# Patient Record
Sex: MALE | Race: WHITE | Employment: OTHER | URBAN - METROPOLITAN AREA
[De-identification: names, ages, dates, MRNs, and addresses within clinical notes are randomized per-mention and may not be internally consistent; named-entity substitution may affect disease eponyms.]

---

## 2017-01-01 ENCOUNTER — APPOINTMENT (OUTPATIENT)
Dept: CT IMAGING | Facility: CLINIC | Age: 82
End: 2017-01-01
Attending: EMERGENCY MEDICINE
Payer: MEDICARE

## 2017-01-01 ENCOUNTER — CARE COORDINATION (OUTPATIENT)
Dept: CARE COORDINATION | Facility: CLINIC | Age: 82
End: 2017-01-01

## 2017-01-01 ENCOUNTER — HOSPITAL ENCOUNTER (EMERGENCY)
Facility: CLINIC | Age: 82
Discharge: HOME | End: 2017-03-02
Attending: FAMILY MEDICINE | Admitting: FAMILY MEDICINE
Payer: MEDICARE

## 2017-01-01 ENCOUNTER — TELEPHONE (OUTPATIENT)
Dept: GERIATRICS | Facility: CLINIC | Age: 82
End: 2017-01-01

## 2017-01-01 ENCOUNTER — NURSING HOME VISIT (OUTPATIENT)
Dept: GERIATRICS | Facility: CLINIC | Age: 82
End: 2017-01-01
Payer: COMMERCIAL

## 2017-01-01 ENCOUNTER — APPOINTMENT (OUTPATIENT)
Dept: GENERAL RADIOLOGY | Facility: CLINIC | Age: 82
DRG: 871 | End: 2017-01-01
Attending: NURSE PRACTITIONER
Payer: MEDICARE

## 2017-01-01 ENCOUNTER — TELEPHONE (OUTPATIENT)
Dept: INTERNAL MEDICINE | Facility: CLINIC | Age: 82
End: 2017-01-01

## 2017-01-01 ENCOUNTER — TELEPHONE (OUTPATIENT)
Dept: FAMILY MEDICINE | Facility: CLINIC | Age: 82
End: 2017-01-01

## 2017-01-01 ENCOUNTER — APPOINTMENT (OUTPATIENT)
Dept: CT IMAGING | Facility: CLINIC | Age: 82
End: 2017-01-01
Attending: FAMILY MEDICINE
Payer: MEDICARE

## 2017-01-01 ENCOUNTER — TRANSFERRED RECORDS (OUTPATIENT)
Dept: HEALTH INFORMATION MANAGEMENT | Facility: CLINIC | Age: 82
End: 2017-01-01

## 2017-01-01 ENCOUNTER — HOSPITAL LABORATORY (OUTPATIENT)
Dept: NURSING HOME | Facility: OTHER | Age: 82
End: 2017-01-01

## 2017-01-01 ENCOUNTER — OFFICE VISIT (OUTPATIENT)
Dept: FAMILY MEDICINE | Facility: OTHER | Age: 82
End: 2017-01-01
Payer: COMMERCIAL

## 2017-01-01 ENCOUNTER — TELEPHONE (OUTPATIENT)
Dept: NURSING | Facility: CLINIC | Age: 82
End: 2017-01-01

## 2017-01-01 ENCOUNTER — APPOINTMENT (OUTPATIENT)
Dept: GENERAL RADIOLOGY | Facility: CLINIC | Age: 82
End: 2017-01-01
Attending: EMERGENCY MEDICINE
Payer: MEDICARE

## 2017-01-01 ENCOUNTER — HOSPITAL ENCOUNTER (OUTPATIENT)
Dept: NUCLEAR MEDICINE | Facility: CLINIC | Age: 82
Setting detail: NUCLEAR MEDICINE
Discharge: HOME | End: 2017-01-03
Attending: INTERNAL MEDICINE | Admitting: INTERNAL MEDICINE
Payer: MEDICARE

## 2017-01-01 ENCOUNTER — OFFICE VISIT (OUTPATIENT)
Dept: CARDIOLOGY | Facility: CLINIC | Age: 82
End: 2017-01-01
Payer: COMMERCIAL

## 2017-01-01 ENCOUNTER — APPOINTMENT (OUTPATIENT)
Dept: SPEECH THERAPY | Facility: CLINIC | Age: 82
DRG: 871 | End: 2017-01-01
Attending: FAMILY MEDICINE
Payer: MEDICARE

## 2017-01-01 ENCOUNTER — HOSPITAL ENCOUNTER (INPATIENT)
Facility: CLINIC | Age: 82
LOS: 4 days | DRG: 871 | End: 2017-04-21
Attending: FAMILY MEDICINE | Admitting: FAMILY MEDICINE
Payer: MEDICARE

## 2017-01-01 ENCOUNTER — TELEPHONE (OUTPATIENT)
Dept: FAMILY MEDICINE | Facility: OTHER | Age: 82
End: 2017-01-01

## 2017-01-01 ENCOUNTER — TELEPHONE (OUTPATIENT)
Dept: CARDIOLOGY | Facility: CLINIC | Age: 82
End: 2017-01-01

## 2017-01-01 ENCOUNTER — APPOINTMENT (OUTPATIENT)
Dept: GENERAL RADIOLOGY | Facility: CLINIC | Age: 82
End: 2017-01-01
Attending: FAMILY MEDICINE
Payer: MEDICARE

## 2017-01-01 ENCOUNTER — HOSPITAL ENCOUNTER (INPATIENT)
Facility: CLINIC | Age: 82
LOS: 3 days | Discharge: NURSING, REHAB OR LONG-TERM CARE | DRG: 871 | End: 2017-03-20
Attending: FAMILY MEDICINE | Admitting: FAMILY MEDICINE
Payer: MEDICARE

## 2017-01-01 ENCOUNTER — HOSPITAL ENCOUNTER (OUTPATIENT)
Dept: CARDIOLOGY | Facility: CLINIC | Age: 82
Discharge: HOME | End: 2017-01-16
Attending: INTERNAL MEDICINE | Admitting: INTERNAL MEDICINE
Payer: MEDICARE

## 2017-01-01 ENCOUNTER — APPOINTMENT (OUTPATIENT)
Dept: GENERAL RADIOLOGY | Facility: CLINIC | Age: 82
DRG: 871 | End: 2017-01-01
Attending: FAMILY MEDICINE
Payer: MEDICARE

## 2017-01-01 ENCOUNTER — OFFICE VISIT (OUTPATIENT)
Dept: CARDIOLOGY | Facility: CLINIC | Age: 82
End: 2017-01-01
Attending: INTERNAL MEDICINE
Payer: COMMERCIAL

## 2017-01-01 ENCOUNTER — ALLIED HEALTH/NURSE VISIT (OUTPATIENT)
Dept: FAMILY MEDICINE | Facility: OTHER | Age: 82
End: 2017-01-01
Payer: COMMERCIAL

## 2017-01-01 ENCOUNTER — HOSPITAL ENCOUNTER (OUTPATIENT)
Facility: CLINIC | Age: 82
Setting detail: OBSERVATION
Discharge: HOME | End: 2017-02-11
Attending: EMERGENCY MEDICINE | Admitting: INTERNAL MEDICINE
Payer: MEDICARE

## 2017-01-01 ENCOUNTER — APPOINTMENT (OUTPATIENT)
Dept: MRI IMAGING | Facility: CLINIC | Age: 82
End: 2017-01-01
Attending: INTERNAL MEDICINE
Payer: MEDICARE

## 2017-01-01 VITALS
HEART RATE: 69 BPM | WEIGHT: 149.91 LBS | OXYGEN SATURATION: 90 % | RESPIRATION RATE: 20 BRPM | TEMPERATURE: 97 F | BODY MASS INDEX: 25.59 KG/M2 | DIASTOLIC BLOOD PRESSURE: 74 MMHG | HEIGHT: 64 IN | SYSTOLIC BLOOD PRESSURE: 130 MMHG

## 2017-01-01 VITALS
BODY MASS INDEX: 25.54 KG/M2 | OXYGEN SATURATION: 97 % | HEIGHT: 64 IN | DIASTOLIC BLOOD PRESSURE: 52 MMHG | HEART RATE: 66 BPM | SYSTOLIC BLOOD PRESSURE: 90 MMHG | WEIGHT: 149.6 LBS

## 2017-01-01 VITALS
DIASTOLIC BLOOD PRESSURE: 41 MMHG | TEMPERATURE: 96.6 F | SYSTOLIC BLOOD PRESSURE: 75 MMHG | HEART RATE: 52 BPM | OXYGEN SATURATION: 70 %

## 2017-01-01 VITALS
RESPIRATION RATE: 24 BRPM | BODY MASS INDEX: 21.27 KG/M2 | WEIGHT: 124.56 LBS | HEART RATE: 96 BPM | DIASTOLIC BLOOD PRESSURE: 84 MMHG | HEIGHT: 64 IN | SYSTOLIC BLOOD PRESSURE: 191 MMHG | TEMPERATURE: 98.6 F | OXYGEN SATURATION: 81 %

## 2017-01-01 VITALS
DIASTOLIC BLOOD PRESSURE: 64 MMHG | BODY MASS INDEX: 24.94 KG/M2 | SYSTOLIC BLOOD PRESSURE: 168 MMHG | HEIGHT: 64 IN | OXYGEN SATURATION: 98 % | WEIGHT: 146.1 LBS | HEART RATE: 66 BPM | TEMPERATURE: 97 F

## 2017-01-01 VITALS
SYSTOLIC BLOOD PRESSURE: 114 MMHG | RESPIRATION RATE: 18 BRPM | WEIGHT: 140.8 LBS | DIASTOLIC BLOOD PRESSURE: 71 MMHG | TEMPERATURE: 96.6 F | OXYGEN SATURATION: 90 % | HEART RATE: 68 BPM | BODY MASS INDEX: 24.17 KG/M2

## 2017-01-01 VITALS
DIASTOLIC BLOOD PRESSURE: 55 MMHG | WEIGHT: 148.9 LBS | HEART RATE: 64 BPM | TEMPERATURE: 97.6 F | SYSTOLIC BLOOD PRESSURE: 87 MMHG | RESPIRATION RATE: 16 BRPM | OXYGEN SATURATION: 99 % | BODY MASS INDEX: 25.56 KG/M2

## 2017-01-01 VITALS
HEIGHT: 65 IN | DIASTOLIC BLOOD PRESSURE: 50 MMHG | BODY MASS INDEX: 24.97 KG/M2 | WEIGHT: 149.9 LBS | RESPIRATION RATE: 13 BRPM | SYSTOLIC BLOOD PRESSURE: 118 MMHG | HEART RATE: 66 BPM | OXYGEN SATURATION: 97 %

## 2017-01-01 VITALS
HEIGHT: 64 IN | HEART RATE: 61 BPM | BODY MASS INDEX: 23.9 KG/M2 | RESPIRATION RATE: 18 BRPM | SYSTOLIC BLOOD PRESSURE: 149 MMHG | OXYGEN SATURATION: 96 % | DIASTOLIC BLOOD PRESSURE: 76 MMHG | TEMPERATURE: 97.1 F | WEIGHT: 139.99 LBS

## 2017-01-01 VITALS
HEART RATE: 100 BPM | DIASTOLIC BLOOD PRESSURE: 73 MMHG | OXYGEN SATURATION: 90 % | RESPIRATION RATE: 24 BRPM | TEMPERATURE: 97.7 F | SYSTOLIC BLOOD PRESSURE: 155 MMHG

## 2017-01-01 VITALS
OXYGEN SATURATION: 96 % | TEMPERATURE: 97 F | BODY MASS INDEX: 24.75 KG/M2 | HEIGHT: 64 IN | WEIGHT: 145 LBS | SYSTOLIC BLOOD PRESSURE: 153 MMHG | RESPIRATION RATE: 16 BRPM | DIASTOLIC BLOOD PRESSURE: 81 MMHG

## 2017-01-01 VITALS
TEMPERATURE: 97.7 F | WEIGHT: 156.8 LBS | DIASTOLIC BLOOD PRESSURE: 42 MMHG | OXYGEN SATURATION: 94 % | HEART RATE: 75 BPM | BODY MASS INDEX: 26.77 KG/M2 | SYSTOLIC BLOOD PRESSURE: 102 MMHG | HEIGHT: 64 IN

## 2017-01-01 VITALS
OXYGEN SATURATION: 99 % | DIASTOLIC BLOOD PRESSURE: 62 MMHG | HEART RATE: 86 BPM | WEIGHT: 148 LBS | RESPIRATION RATE: 12 BRPM | SYSTOLIC BLOOD PRESSURE: 124 MMHG | BODY MASS INDEX: 25.4 KG/M2 | TEMPERATURE: 97 F

## 2017-01-01 VITALS
SYSTOLIC BLOOD PRESSURE: 146 MMHG | TEMPERATURE: 97.6 F | BODY MASS INDEX: 21.75 KG/M2 | DIASTOLIC BLOOD PRESSURE: 87 MMHG | WEIGHT: 126.7 LBS | RESPIRATION RATE: 12 BRPM | OXYGEN SATURATION: 94 % | HEART RATE: 68 BPM

## 2017-01-01 DIAGNOSIS — N40.1 BENIGN NON-NODULAR PROSTATIC HYPERPLASIA WITH LOWER URINARY TRACT SYMPTOMS: ICD-10-CM

## 2017-01-01 DIAGNOSIS — J18.9 PNEUMONIA OF RIGHT LOWER LOBE DUE TO INFECTIOUS ORGANISM: ICD-10-CM

## 2017-01-01 DIAGNOSIS — R60.0 LOCALIZED EDEMA: Primary | ICD-10-CM

## 2017-01-01 DIAGNOSIS — F03.91 DEMENTIA WITH BEHAVIORAL DISTURBANCE, UNSPECIFIED DEMENTIA TYPE: ICD-10-CM

## 2017-01-01 DIAGNOSIS — M62.81 GENERALIZED MUSCLE WEAKNESS: ICD-10-CM

## 2017-01-01 DIAGNOSIS — R13.10 DYSPHAGIA, UNSPECIFIED TYPE: ICD-10-CM

## 2017-01-01 DIAGNOSIS — Z01.30 BP CHECK: Primary | ICD-10-CM

## 2017-01-01 DIAGNOSIS — N39.41 URGENCY INCONTINENCE: ICD-10-CM

## 2017-01-01 DIAGNOSIS — I67.2 ATHEROSCLEROTIC CEREBROVASCULAR DISEASE: ICD-10-CM

## 2017-01-01 DIAGNOSIS — A41.9 BACTERIAL SEPSIS (H): ICD-10-CM

## 2017-01-01 DIAGNOSIS — E86.0 DEHYDRATION: ICD-10-CM

## 2017-01-01 DIAGNOSIS — I25.110 CORONARY ARTERY DISEASE INVOLVING NATIVE CORONARY ARTERY OF NATIVE HEART WITH UNSTABLE ANGINA PECTORIS (H): Primary | ICD-10-CM

## 2017-01-01 DIAGNOSIS — B37.0 THRUSH: ICD-10-CM

## 2017-01-01 DIAGNOSIS — F03.91 DEMENTIA WITH BEHAVIORAL DISTURBANCE, UNSPECIFIED DEMENTIA TYPE: Primary | ICD-10-CM

## 2017-01-01 DIAGNOSIS — R60.0 LOCALIZED EDEMA: ICD-10-CM

## 2017-01-01 DIAGNOSIS — R94.39 ABNORMAL CARDIOVASCULAR STRESS TEST: ICD-10-CM

## 2017-01-01 DIAGNOSIS — I10 BENIGN ESSENTIAL HYPERTENSION: Primary | ICD-10-CM

## 2017-01-01 DIAGNOSIS — I67.2 ATHEROSCLEROTIC CEREBROVASCULAR DISEASE: Primary | ICD-10-CM

## 2017-01-01 DIAGNOSIS — R00.1 BRADYCARDIA: ICD-10-CM

## 2017-01-01 DIAGNOSIS — I21.4 MYOCARDIAL INFARCTION, NONTRANSMURAL (H): ICD-10-CM

## 2017-01-01 DIAGNOSIS — R79.89 ELEVATED TROPONIN: ICD-10-CM

## 2017-01-01 DIAGNOSIS — I49.5 SINOATRIAL NODE DYSFUNCTION (H): ICD-10-CM

## 2017-01-01 DIAGNOSIS — R82.81 PYURIA: ICD-10-CM

## 2017-01-01 DIAGNOSIS — M25.562 LEFT KNEE PAIN: Primary | ICD-10-CM

## 2017-01-01 DIAGNOSIS — J69.0 ASPIRATION PNEUMONIA OF BOTH LUNGS, UNSPECIFIED ASPIRATION PNEUMONIA TYPE, UNSPECIFIED PART OF LUNG (H): ICD-10-CM

## 2017-01-01 DIAGNOSIS — Z71.89 ADVANCED DIRECTIVES, COUNSELING/DISCUSSION: ICD-10-CM

## 2017-01-01 DIAGNOSIS — J69.0 ASPIRATION PNEUMONIA OF BOTH LUNGS, UNSPECIFIED ASPIRATION PNEUMONIA TYPE, UNSPECIFIED PART OF LUNG (H): Primary | ICD-10-CM

## 2017-01-01 DIAGNOSIS — A41.9 SEPSIS, DUE TO UNSPECIFIED ORGANISM: ICD-10-CM

## 2017-01-01 DIAGNOSIS — I95.9 HYPOTENSION, UNSPECIFIED HYPOTENSION TYPE: Primary | ICD-10-CM

## 2017-01-01 DIAGNOSIS — I67.2 ARTERIOSCLEROTIC CEREBROVASCULAR DISEASE: Primary | ICD-10-CM

## 2017-01-01 DIAGNOSIS — I25.10 CORONARY ARTERY DISEASE INVOLVING NATIVE HEART, ANGINA PRESENCE UNSPECIFIED, UNSPECIFIED VESSEL OR LESION TYPE: Primary | ICD-10-CM

## 2017-01-01 DIAGNOSIS — Z51.5 END OF LIFE CARE: Primary | ICD-10-CM

## 2017-01-01 DIAGNOSIS — R40.4 TRANSIENT ALTERATION OF AWARENESS: ICD-10-CM

## 2017-01-01 DIAGNOSIS — R63.4 LOSS OF WEIGHT: ICD-10-CM

## 2017-01-01 DIAGNOSIS — J18.9 HEALTHCARE-ASSOCIATED PNEUMONIA: ICD-10-CM

## 2017-01-01 DIAGNOSIS — I25.10 CORONARY ARTERY DISEASE INVOLVING NATIVE HEART, ANGINA PRESENCE UNSPECIFIED, UNSPECIFIED VESSEL OR LESION TYPE: ICD-10-CM

## 2017-01-01 DIAGNOSIS — H57.9 EYE DISEASE: ICD-10-CM
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
## 2017-01-01 DIAGNOSIS — J96.21 ACUTE ON CHRONIC RESPIRATORY FAILURE WITH HYPOXIA (H): ICD-10-CM

## 2017-01-01 DIAGNOSIS — I49.5 SINOATRIAL NODE DYSFUNCTION (H): Primary | ICD-10-CM

## 2017-01-01 DIAGNOSIS — R94.39 ABNORMAL STRESS TEST: Primary | ICD-10-CM

## 2017-01-01 LAB
ALBUMIN SERPL-MCNC: 2.7 G/DL (ref 3.4–5)
ALBUMIN SERPL-MCNC: 2.9 G/DL (ref 3.4–5)
ALBUMIN SERPL-MCNC: 3.3 G/DL (ref 3.4–5)
ALBUMIN UR-MCNC: 100 MG/DL
ALBUMIN UR-MCNC: ABNORMAL MG/DL
ALBUMIN UR-MCNC: NEGATIVE MG/DL
ALP SERPL-CCNC: 66 U/L (ref 40–150)
ALP SERPL-CCNC: 70 U/L (ref 40–150)
ALP SERPL-CCNC: 79 U/L (ref 40–150)
ALT SERPL W P-5'-P-CCNC: 23 U/L (ref 0–70)
ALT SERPL W P-5'-P-CCNC: 31 U/L (ref 0–70)
ALT SERPL W P-5'-P-CCNC: 32 U/L (ref 0–70)
ANION GAP SERPL CALCULATED.3IONS-SCNC: 11 MMOL/L (ref 3–14)
ANION GAP SERPL CALCULATED.3IONS-SCNC: 4 MMOL/L (ref 3–14)
ANION GAP SERPL CALCULATED.3IONS-SCNC: 6 MMOL/L (ref 3–14)
ANION GAP SERPL CALCULATED.3IONS-SCNC: 7 MMOL/L (ref 3–14)
ANION GAP SERPL CALCULATED.3IONS-SCNC: 8 MMOL/L (ref 3–14)
ANION GAP SERPL CALCULATED.3IONS-SCNC: 9 MMOL/L (ref 3–14)
APPEARANCE UR: ABNORMAL
APPEARANCE UR: CLEAR
APPEARANCE UR: CLEAR
AST SERPL W P-5'-P-CCNC: 19 U/L (ref 0–45)
AST SERPL W P-5'-P-CCNC: 23 U/L (ref 0–45)
AST SERPL W P-5'-P-CCNC: 25 U/L (ref 0–45)
BACTERIA #/AREA URNS HPF: ABNORMAL /HPF
BACTERIA #/AREA URNS HPF: ABNORMAL /HPF
BACTERIA SPEC CULT: NORMAL
BASE EXCESS BLDV CALC-SCNC: 4.6 MMOL/L
BASOPHILS # BLD AUTO: 0 10E9/L (ref 0–0.2)
BASOPHILS NFR BLD AUTO: 0.2 %
BASOPHILS NFR BLD AUTO: 0.3 %
BASOPHILS NFR BLD AUTO: 0.3 %
BASOPHILS NFR BLD AUTO: 0.4 %
BASOPHILS NFR BLD AUTO: 0.4 %
BILIRUB SERPL-MCNC: 0.4 MG/DL (ref 0.2–1.3)
BILIRUB SERPL-MCNC: 0.5 MG/DL (ref 0.2–1.3)
BILIRUB SERPL-MCNC: 1.1 MG/DL (ref 0.2–1.3)
BILIRUB UR QL STRIP: NEGATIVE
BUN SERPL-MCNC: 18 MG/DL (ref 7–30)
BUN SERPL-MCNC: 20 MG/DL (ref 7–30)
BUN SERPL-MCNC: 24 MG/DL (ref 7–30)
BUN SERPL-MCNC: 24 MG/DL (ref 7–30)
BUN SERPL-MCNC: 32 MG/DL (ref 7–30)
BUN SERPL-MCNC: 33 MG/DL (ref 7–30)
BUN SERPL-MCNC: 40 MG/DL (ref 7–30)
BUN SERPL-MCNC: 50 MG/DL (ref 7–30)
BUN SERPL-MCNC: 56 MG/DL (ref 7–30)
BUN SERPL-MCNC: 61 MG/DL (ref 7–30)
BUN SERPL-MCNC: 75 MG/DL (ref 7–30)
CALCIUM SERPL-MCNC: 10.4 MG/DL (ref 8.5–10.1)
CALCIUM SERPL-MCNC: 8.5 MG/DL (ref 8.5–10.1)
CALCIUM SERPL-MCNC: 8.5 MG/DL (ref 8.5–10.1)
CALCIUM SERPL-MCNC: 8.6 MG/DL (ref 8.5–10.1)
CALCIUM SERPL-MCNC: 8.9 MG/DL (ref 8.5–10.1)
CALCIUM SERPL-MCNC: 9 MG/DL (ref 8.5–10.1)
CALCIUM SERPL-MCNC: 9 MG/DL (ref 8.5–10.1)
CALCIUM SERPL-MCNC: 9.6 MG/DL (ref 8.5–10.1)
CALCIUM SERPL-MCNC: 9.6 MG/DL (ref 8.5–10.1)
CHLORIDE SERPL-SCNC: 105 MMOL/L (ref 94–109)
CHLORIDE SERPL-SCNC: 106 MMOL/L (ref 94–109)
CHLORIDE SERPL-SCNC: 107 MMOL/L (ref 94–109)
CHLORIDE SERPL-SCNC: 109 MMOL/L (ref 94–109)
CHLORIDE SERPL-SCNC: 109 MMOL/L (ref 94–109)
CHLORIDE SERPL-SCNC: 113 MMOL/L (ref 94–109)
CHLORIDE SERPL-SCNC: 116 MMOL/L (ref 94–109)
CHLORIDE SERPL-SCNC: 116 MMOL/L (ref 94–109)
CHLORIDE SERPL-SCNC: 118 MMOL/L (ref 94–109)
CHLORIDE SERPL-SCNC: 122 MMOL/L (ref 94–109)
CHLORIDE SERPL-SCNC: 126 MMOL/L (ref 94–109)
CO2 SERPL-SCNC: 27 MMOL/L (ref 20–32)
CO2 SERPL-SCNC: 28 MMOL/L (ref 20–32)
CO2 SERPL-SCNC: 30 MMOL/L (ref 20–32)
CO2 SERPL-SCNC: 31 MMOL/L (ref 20–32)
CO2 SERPL-SCNC: 34 MMOL/L (ref 20–32)
COLOR UR AUTO: ABNORMAL
COLOR UR AUTO: YELLOW
COLOR UR AUTO: YELLOW
CREAT SERPL-MCNC: 0.85 MG/DL (ref 0.66–1.25)
CREAT SERPL-MCNC: 0.87 MG/DL (ref 0.66–1.25)
CREAT SERPL-MCNC: 0.92 MG/DL (ref 0.66–1.25)
CREAT SERPL-MCNC: 1.03 MG/DL (ref 0.66–1.25)
CREAT SERPL-MCNC: 1.05 MG/DL (ref 0.66–1.25)
CREAT SERPL-MCNC: 1.24 MG/DL (ref 0.66–1.25)
CREAT SERPL-MCNC: 1.34 MG/DL (ref 0.66–1.25)
CREAT SERPL-MCNC: 1.67 MG/DL (ref 0.66–1.25)
CREAT SERPL-MCNC: 1.79 MG/DL (ref 0.66–1.25)
CREAT SERPL-MCNC: 1.95 MG/DL (ref 0.66–1.25)
CREAT SERPL-MCNC: 2.44 MG/DL (ref 0.66–1.25)
CRP SERPL-MCNC: 207 MG/L (ref 0–8)
DIFFERENTIAL METHOD BLD: ABNORMAL
EOSINOPHIL # BLD AUTO: 0 10E9/L (ref 0–0.7)
EOSINOPHIL # BLD AUTO: 0 10E9/L (ref 0–0.7)
EOSINOPHIL # BLD AUTO: 0.1 10E9/L (ref 0–0.7)
EOSINOPHIL # BLD AUTO: 0.1 10E9/L (ref 0–0.7)
EOSINOPHIL # BLD AUTO: 0.3 10E9/L (ref 0–0.7)
EOSINOPHIL NFR BLD AUTO: 0.4 %
EOSINOPHIL NFR BLD AUTO: 0.5 %
EOSINOPHIL NFR BLD AUTO: 1 %
EOSINOPHIL NFR BLD AUTO: 1.8 %
EOSINOPHIL NFR BLD AUTO: 3.6 %
ERYTHROCYTE [DISTWIDTH] IN BLOOD BY AUTOMATED COUNT: 13.1 % (ref 10–15)
ERYTHROCYTE [DISTWIDTH] IN BLOOD BY AUTOMATED COUNT: 13.3 % (ref 10–15)
ERYTHROCYTE [DISTWIDTH] IN BLOOD BY AUTOMATED COUNT: 14.1 % (ref 10–15)
ERYTHROCYTE [DISTWIDTH] IN BLOOD BY AUTOMATED COUNT: 14.4 % (ref 10–15)
ERYTHROCYTE [DISTWIDTH] IN BLOOD BY AUTOMATED COUNT: 14.4 % (ref 10–15)
ERYTHROCYTE [DISTWIDTH] IN BLOOD BY AUTOMATED COUNT: 15.1 % (ref 10–15)
ERYTHROCYTE [DISTWIDTH] IN BLOOD BY AUTOMATED COUNT: 15.2 % (ref 10–15)
FLUAV+FLUBV AG SPEC QL: ABNORMAL
FLUAV+FLUBV AG SPEC QL: NEGATIVE
FLUAV+FLUBV AG SPEC QL: NORMAL
FLUAV+FLUBV AG SPEC QL: POSITIVE
GFR SERPL CREATININE-BSD FRML MDRD: 25 ML/MIN/1.7M2
GFR SERPL CREATININE-BSD FRML MDRD: 33 ML/MIN/1.7M2
GFR SERPL CREATININE-BSD FRML MDRD: 36 ML/MIN/1.7M2
GFR SERPL CREATININE-BSD FRML MDRD: 39 ML/MIN/1.7M2
GFR SERPL CREATININE-BSD FRML MDRD: 51 ML/MIN/1.7M2
GFR SERPL CREATININE-BSD FRML MDRD: 55 ML/MIN/1.7M2
GFR SERPL CREATININE-BSD FRML MDRD: 67 ML/MIN/1.7M2
GFR SERPL CREATININE-BSD FRML MDRD: 68 ML/MIN/1.7M2
GFR SERPL CREATININE-BSD FRML MDRD: 78 ML/MIN/1.7M2
GFR SERPL CREATININE-BSD FRML MDRD: 83 ML/MIN/1.7M2
GFR SERPL CREATININE-BSD FRML MDRD: 85 ML/MIN/1.7M2
GLUCOSE BLDC GLUCOMTR-MCNC: 167 MG/DL (ref 70–99)
GLUCOSE SERPL-MCNC: 103 MG/DL (ref 70–99)
GLUCOSE SERPL-MCNC: 111 MG/DL (ref 70–99)
GLUCOSE SERPL-MCNC: 123 MG/DL (ref 70–99)
GLUCOSE SERPL-MCNC: 127 MG/DL (ref 70–99)
GLUCOSE SERPL-MCNC: 128 MG/DL (ref 70–99)
GLUCOSE SERPL-MCNC: 130 MG/DL (ref 70–99)
GLUCOSE SERPL-MCNC: 130 MG/DL (ref 70–99)
GLUCOSE SERPL-MCNC: 141 MG/DL (ref 70–99)
GLUCOSE SERPL-MCNC: 151 MG/DL (ref 70–99)
GLUCOSE SERPL-MCNC: 159 MG/DL (ref 70–99)
GLUCOSE SERPL-MCNC: 94 MG/DL (ref 70–99)
GLUCOSE UR STRIP-MCNC: 50 MG/DL
GLUCOSE UR STRIP-MCNC: NEGATIVE MG/DL
GLUCOSE UR STRIP-MCNC: NEGATIVE MG/DL
HCO3 BLDV-SCNC: 30 MMOL/L (ref 21–28)
HCT VFR BLD AUTO: 35.9 % (ref 40–53)
HCT VFR BLD AUTO: 36 % (ref 40–53)
HCT VFR BLD AUTO: 36.4 % (ref 40–53)
HCT VFR BLD AUTO: 36.5 % (ref 40–53)
HCT VFR BLD AUTO: 37.8 % (ref 40–53)
HCT VFR BLD AUTO: 43 % (ref 40–53)
HCT VFR BLD AUTO: 45.4 % (ref 40–53)
HEMOCCULT STL QL: NEGATIVE
HGB BLD-MCNC: 11.5 G/DL (ref 13.3–17.7)
HGB BLD-MCNC: 11.5 G/DL (ref 13.3–17.7)
HGB BLD-MCNC: 12.1 G/DL (ref 13.3–17.7)
HGB BLD-MCNC: 12.4 G/DL (ref 13.3–17.7)
HGB BLD-MCNC: 12.6 G/DL (ref 13.3–17.7)
HGB BLD-MCNC: 14.1 G/DL (ref 13.3–17.7)
HGB BLD-MCNC: 14.2 G/DL (ref 13.3–17.7)
HGB BLD-MCNC: 14.8 G/DL (ref 13.3–17.7)
HGB UR QL STRIP: ABNORMAL
HYALINE CASTS #/AREA URNS LPF: 14 /LPF (ref 0–2)
HYALINE CASTS #/AREA URNS LPF: ABNORMAL /LPF (ref 0–2)
HYALINE CASTS #/AREA URNS LPF: ABNORMAL /LPF (ref 0–2)
IMM GRANULOCYTES # BLD: 0 10E9/L (ref 0–0.4)
IMM GRANULOCYTES NFR BLD: 0.1 %
IMM GRANULOCYTES NFR BLD: 0.1 %
IMM GRANULOCYTES NFR BLD: 0.3 %
IMM GRANULOCYTES NFR BLD: 0.3 %
IMM GRANULOCYTES NFR BLD: 0.4 %
KETONES UR STRIP-MCNC: NEGATIVE MG/DL
LACTATE BLD-SCNC: 1.5 MMOL/L (ref 0.7–2.1)
LACTATE BLD-SCNC: 1.9 MMOL/L (ref 0.7–2.1)
LACTATE BLD-SCNC: 2.3 MMOL/L (ref 0.7–2.1)
LEUKOCYTE ESTERASE UR QL STRIP: ABNORMAL
LEUKOCYTE ESTERASE UR QL STRIP: ABNORMAL
LEUKOCYTE ESTERASE UR QL STRIP: NEGATIVE
LYMPHOCYTES # BLD AUTO: 0.5 10E9/L (ref 0.8–5.3)
LYMPHOCYTES # BLD AUTO: 0.6 10E9/L (ref 0.8–5.3)
LYMPHOCYTES # BLD AUTO: 0.7 10E9/L (ref 0.8–5.3)
LYMPHOCYTES # BLD AUTO: 0.7 10E9/L (ref 0.8–5.3)
LYMPHOCYTES # BLD AUTO: 1 10E9/L (ref 0.8–5.3)
LYMPHOCYTES NFR BLD AUTO: 11.2 %
LYMPHOCYTES NFR BLD AUTO: 13.6 %
LYMPHOCYTES NFR BLD AUTO: 6.2 %
LYMPHOCYTES NFR BLD AUTO: 8.2 %
LYMPHOCYTES NFR BLD AUTO: 9.3 %
Lab: 30
Lab: 30
MCH RBC QN AUTO: 31.4 PG (ref 26.5–33)
MCH RBC QN AUTO: 31.4 PG (ref 26.5–33)
MCH RBC QN AUTO: 31.6 PG (ref 26.5–33)
MCH RBC QN AUTO: 31.8 PG (ref 26.5–33)
MCH RBC QN AUTO: 31.8 PG (ref 26.5–33)
MCHC RBC AUTO-ENTMCNC: 31.9 G/DL (ref 31.5–36.5)
MCHC RBC AUTO-ENTMCNC: 32 G/DL (ref 31.5–36.5)
MCHC RBC AUTO-ENTMCNC: 32.6 G/DL (ref 31.5–36.5)
MCHC RBC AUTO-ENTMCNC: 32.8 G/DL (ref 31.5–36.5)
MCHC RBC AUTO-ENTMCNC: 33.2 G/DL (ref 31.5–36.5)
MCHC RBC AUTO-ENTMCNC: 33.3 G/DL (ref 31.5–36.5)
MCHC RBC AUTO-ENTMCNC: 34 G/DL (ref 31.5–36.5)
MCV RBC AUTO: 94 FL (ref 78–100)
MCV RBC AUTO: 95 FL (ref 78–100)
MCV RBC AUTO: 96 FL (ref 78–100)
MCV RBC AUTO: 99 FL (ref 78–100)
MCV RBC AUTO: 99 FL (ref 78–100)
MICRO REPORT STATUS: NORMAL
MONOCYTES # BLD AUTO: 0.4 10E9/L (ref 0–1.3)
MONOCYTES # BLD AUTO: 0.6 10E9/L (ref 0–1.3)
MONOCYTES # BLD AUTO: 0.7 10E9/L (ref 0–1.3)
MONOCYTES # BLD AUTO: 0.8 10E9/L (ref 0–1.3)
MONOCYTES # BLD AUTO: 1.2 10E9/L (ref 0–1.3)
MONOCYTES NFR BLD AUTO: 16.1 %
MONOCYTES NFR BLD AUTO: 8.1 %
MONOCYTES NFR BLD AUTO: 8.1 %
MONOCYTES NFR BLD AUTO: 8.9 %
MONOCYTES NFR BLD AUTO: 9.4 %
MUCOUS THREADS #/AREA URNS LPF: PRESENT /LPF
MUCOUS THREADS #/AREA URNS LPF: PRESENT /LPF
NEUTROPHILS # BLD AUTO: 4 10E9/L (ref 1.6–8.3)
NEUTROPHILS # BLD AUTO: 5.5 10E9/L (ref 1.6–8.3)
NEUTROPHILS # BLD AUTO: 5.5 10E9/L (ref 1.6–8.3)
NEUTROPHILS # BLD AUTO: 5.7 10E9/L (ref 1.6–8.3)
NEUTROPHILS # BLD AUTO: 7 10E9/L (ref 1.6–8.3)
NEUTROPHILS NFR BLD AUTO: 76.2 %
NEUTROPHILS NFR BLD AUTO: 77.3 %
NEUTROPHILS NFR BLD AUTO: 77.6 %
NEUTROPHILS NFR BLD AUTO: 78.1 %
NEUTROPHILS NFR BLD AUTO: 81.6 %
NITRATE UR QL: NEGATIVE
NON-SQ EPI CELLS #/AREA URNS LPF: ABNORMAL /LPF
NT-PROBNP SERPL-MCNC: 502 PG/ML (ref 0–1800)
NT-PROBNP SERPL-MCNC: 889 PG/ML (ref 0–1800)
O2/TOTAL GAS SETTING VFR VENT: 28 %
PCO2 BLDV: 51 MM HG (ref 40–50)
PH BLDV: 7.38 PH (ref 7.32–7.43)
PH UR STRIP: 5 PH (ref 5–7)
PH UR STRIP: 6 PH (ref 5–7)
PH UR STRIP: 6.5 PH (ref 5–7)
PLATELET # BLD AUTO: 136 10E9/L (ref 150–450)
PLATELET # BLD AUTO: 155 10E9/L (ref 150–450)
PLATELET # BLD AUTO: 163 10E9/L (ref 150–450)
PLATELET # BLD AUTO: 170 10E9/L (ref 150–450)
PLATELET # BLD AUTO: 207 10E9/L (ref 150–450)
PLATELET # BLD AUTO: 208 10E9/L (ref 150–450)
PLATELET # BLD AUTO: 211 10E9/L (ref 150–450)
PO2 BLDV: 54 MM HG (ref 25–47)
POTASSIUM SERPL-SCNC: 2.6 MMOL/L (ref 3.4–5.3)
POTASSIUM SERPL-SCNC: 3.3 MMOL/L (ref 3.4–5.3)
POTASSIUM SERPL-SCNC: 3.5 MMOL/L (ref 3.4–5.3)
POTASSIUM SERPL-SCNC: 3.7 MMOL/L (ref 3.4–5.3)
POTASSIUM SERPL-SCNC: 3.8 MMOL/L (ref 3.4–5.3)
POTASSIUM SERPL-SCNC: 3.9 MMOL/L (ref 3.4–5.3)
POTASSIUM SERPL-SCNC: 3.9 MMOL/L (ref 3.4–5.3)
POTASSIUM SERPL-SCNC: 4 MMOL/L (ref 3.4–5.3)
POTASSIUM SERPL-SCNC: 4 MMOL/L (ref 3.4–5.3)
PROT SERPL-MCNC: 6 G/DL (ref 6.8–8.8)
PROT SERPL-MCNC: 6.1 G/DL (ref 6.8–8.8)
PROT SERPL-MCNC: 6.7 G/DL (ref 6.8–8.8)
RBC # BLD AUTO: 3.64 10E12/L (ref 4.4–5.9)
RBC # BLD AUTO: 3.64 10E12/L (ref 4.4–5.9)
RBC # BLD AUTO: 3.81 10E12/L (ref 4.4–5.9)
RBC # BLD AUTO: 3.9 10E12/L (ref 4.4–5.9)
RBC # BLD AUTO: 3.99 10E12/L (ref 4.4–5.9)
RBC # BLD AUTO: 4.49 10E12/L (ref 4.4–5.9)
RBC # BLD AUTO: 4.72 10E12/L (ref 4.4–5.9)
RBC #/AREA URNS AUTO: >182 /HPF (ref 0–2)
RBC #/AREA URNS AUTO: ABNORMAL /HPF (ref 0–2)
RBC #/AREA URNS AUTO: ABNORMAL /HPF (ref 0–2)
SODIUM SERPL-SCNC: 144 MMOL/L (ref 133–144)
SODIUM SERPL-SCNC: 147 MMOL/L (ref 133–144)
SODIUM SERPL-SCNC: 150 MMOL/L (ref 133–144)
SODIUM SERPL-SCNC: 155 MMOL/L (ref 133–144)
SODIUM SERPL-SCNC: 156 MMOL/L (ref 133–144)
SODIUM SERPL-SCNC: 160 MMOL/L (ref 133–144)
SODIUM SERPL-SCNC: 161 MMOL/L (ref 133–144)
SODIUM SERPL-SCNC: 162 MMOL/L (ref 133–144)
SP GR UR STRIP: 1.01 (ref 1–1.03)
SP GR UR STRIP: 1.02 (ref 1–1.03)
SP GR UR STRIP: 1.02 (ref 1–1.03)
SPECIMEN SOURCE: ABNORMAL
SPECIMEN SOURCE: NORMAL
SQUAMOUS #/AREA URNS AUTO: 1 /HPF (ref 0–1)
TROPONIN I SERPL-MCNC: 0.06 UG/L (ref 0–0.04)
TROPONIN I SERPL-MCNC: 0.6 UG/L (ref 0–0.04)
URN SPEC COLLECT METH UR: ABNORMAL
UROBILINOGEN UR STRIP-ACNC: 0.2 EU/DL (ref 0.2–1)
UROBILINOGEN UR STRIP-ACNC: 0.2 EU/DL (ref 0.2–1)
UROBILINOGEN UR STRIP-MCNC: 0 MG/DL (ref 0–2)
WBC # BLD AUTO: 10.7 10E9/L (ref 4–11)
WBC # BLD AUTO: 5.1 10E9/L (ref 4–11)
WBC # BLD AUTO: 6 10E9/L (ref 4–11)
WBC # BLD AUTO: 7.2 10E9/L (ref 4–11)
WBC # BLD AUTO: 7.2 10E9/L (ref 4–11)
WBC # BLD AUTO: 7.3 10E9/L (ref 4–11)
WBC # BLD AUTO: 8.6 10E9/L (ref 4–11)
WBC #/AREA URNS AUTO: 115 /HPF (ref 0–2)
WBC #/AREA URNS AUTO: ABNORMAL /HPF (ref 0–2)
WBC #/AREA URNS AUTO: ABNORMAL /HPF (ref 0–2)

## 2017-01-01 PROCEDURE — 96365 THER/PROPH/DIAG IV INF INIT: CPT | Performed by: FAMILY MEDICINE

## 2017-01-01 PROCEDURE — A9270 NON-COVERED ITEM OR SERVICE: HCPCS | Mod: GY | Performed by: FAMILY MEDICINE

## 2017-01-01 PROCEDURE — 99357 ZZC PROLONGED SERV,INPATIENT,EA ADD 1/2: CPT | Performed by: FAMILY MEDICINE

## 2017-01-01 PROCEDURE — 25000125 ZZHC RX 250: Performed by: FAMILY MEDICINE

## 2017-01-01 PROCEDURE — 25000125 ZZHC RX 250: Performed by: NURSE PRACTITIONER

## 2017-01-01 PROCEDURE — 40000225 ZZH STATISTIC SLP WARD VISIT: Performed by: SPEECH-LANGUAGE PATHOLOGIST

## 2017-01-01 PROCEDURE — 87804 INFLUENZA ASSAY W/OPTIC: CPT | Performed by: NURSE PRACTITIONER

## 2017-01-01 PROCEDURE — 99207 ZZC MOONLIGHTING INDICATOR: CPT | Performed by: FAMILY MEDICINE

## 2017-01-01 PROCEDURE — 96361 HYDRATE IV INFUSION ADD-ON: CPT

## 2017-01-01 PROCEDURE — 25000132 ZZH RX MED GY IP 250 OP 250 PS 637: Mod: GY | Performed by: INTERNAL MEDICINE

## 2017-01-01 PROCEDURE — 99284 EMERGENCY DEPT VISIT MOD MDM: CPT | Performed by: FAMILY MEDICINE

## 2017-01-01 PROCEDURE — 85027 COMPLETE CBC AUTOMATED: CPT | Performed by: INTERNAL MEDICINE

## 2017-01-01 PROCEDURE — 99285 EMERGENCY DEPT VISIT HI MDM: CPT | Mod: 25 | Performed by: FAMILY MEDICINE

## 2017-01-01 PROCEDURE — 81001 URINALYSIS AUTO W/SCOPE: CPT | Performed by: FAMILY MEDICINE

## 2017-01-01 PROCEDURE — 99213 OFFICE O/P EST LOW 20 MIN: CPT | Performed by: INTERNAL MEDICINE

## 2017-01-01 PROCEDURE — 83880 ASSAY OF NATRIURETIC PEPTIDE: CPT | Performed by: NURSE PRACTITIONER

## 2017-01-01 PROCEDURE — 25000125 ZZHC RX 250

## 2017-01-01 PROCEDURE — 70551 MRI BRAIN STEM W/O DYE: CPT

## 2017-01-01 PROCEDURE — 25000132 ZZH RX MED GY IP 250 OP 250 PS 637: Mod: GY | Performed by: FAMILY MEDICINE

## 2017-01-01 PROCEDURE — 99239 HOSP IP/OBS DSCHRG MGMT >30: CPT | Performed by: FAMILY MEDICINE

## 2017-01-01 PROCEDURE — 25800025 ZZH RX 258: Performed by: FAMILY MEDICINE

## 2017-01-01 PROCEDURE — 99285 EMERGENCY DEPT VISIT HI MDM: CPT | Performed by: FAMILY MEDICINE

## 2017-01-01 PROCEDURE — 84132 ASSAY OF SERUM POTASSIUM: CPT | Performed by: FAMILY MEDICINE

## 2017-01-01 PROCEDURE — 99308 SBSQ NF CARE LOW MDM 20: CPT | Performed by: NURSE PRACTITIONER

## 2017-01-01 PROCEDURE — 80048 BASIC METABOLIC PNL TOTAL CA: CPT | Performed by: FAMILY MEDICINE

## 2017-01-01 PROCEDURE — 96367 TX/PROPH/DG ADDL SEQ IV INF: CPT | Performed by: FAMILY MEDICINE

## 2017-01-01 PROCEDURE — 25000128 H RX IP 250 OP 636: Performed by: NURSE PRACTITIONER

## 2017-01-01 PROCEDURE — 92610 EVALUATE SWALLOWING FUNCTION: CPT | Mod: GN | Performed by: SPEECH-LANGUAGE PATHOLOGIST

## 2017-01-01 PROCEDURE — 00000146 ZZHCL STATISTIC GLUCOSE BY METER IP

## 2017-01-01 PROCEDURE — 99207 ZZC NO CHARGE NURSE ONLY: CPT

## 2017-01-01 PROCEDURE — 12000000 ZZH R&B MED SURG/OB

## 2017-01-01 PROCEDURE — 99233 SBSQ HOSP IP/OBS HIGH 50: CPT | Performed by: FAMILY MEDICINE

## 2017-01-01 PROCEDURE — 99285 EMERGENCY DEPT VISIT HI MDM: CPT | Mod: 25

## 2017-01-01 PROCEDURE — 99285 EMERGENCY DEPT VISIT HI MDM: CPT | Mod: 25 | Performed by: EMERGENCY MEDICINE

## 2017-01-01 PROCEDURE — 25800025 ZZH RX 258: Performed by: PEDIATRICS

## 2017-01-01 PROCEDURE — 86140 C-REACTIVE PROTEIN: CPT | Performed by: NURSE PRACTITIONER

## 2017-01-01 PROCEDURE — 85025 COMPLETE CBC W/AUTO DIFF WBC: CPT | Performed by: FAMILY MEDICINE

## 2017-01-01 PROCEDURE — 84484 ASSAY OF TROPONIN QUANT: CPT | Performed by: NURSE PRACTITIONER

## 2017-01-01 PROCEDURE — 99213 OFFICE O/P EST LOW 20 MIN: CPT | Performed by: FAMILY MEDICINE

## 2017-01-01 PROCEDURE — 85027 COMPLETE CBC AUTOMATED: CPT | Performed by: FAMILY MEDICINE

## 2017-01-01 PROCEDURE — 99207 ZZC CDG-CORRECTLY CODED, REVIEWED AND AGREE: CPT | Performed by: NURSE PRACTITIONER

## 2017-01-01 PROCEDURE — 83880 ASSAY OF NATRIURETIC PEPTIDE: CPT | Performed by: FAMILY MEDICINE

## 2017-01-01 PROCEDURE — 70498 CT ANGIOGRAPHY NECK: CPT

## 2017-01-01 PROCEDURE — 0296T ZIO PATCH HOLTER: CPT

## 2017-01-01 PROCEDURE — 96365 THER/PROPH/DIAG IV INF INIT: CPT

## 2017-01-01 PROCEDURE — 99207 ZZC MOONLIGHTING INDICATOR: CPT | Performed by: INTERNAL MEDICINE

## 2017-01-01 PROCEDURE — 85025 COMPLETE CBC W/AUTO DIFF WBC: CPT | Performed by: NURSE PRACTITIONER

## 2017-01-01 PROCEDURE — A9270 NON-COVERED ITEM OR SERVICE: HCPCS | Mod: GY | Performed by: INTERNAL MEDICINE

## 2017-01-01 PROCEDURE — 99203 OFFICE O/P NEW LOW 30 MIN: CPT | Performed by: INTERNAL MEDICINE

## 2017-01-01 PROCEDURE — 80053 COMPREHEN METABOLIC PANEL: CPT | Performed by: NURSE PRACTITIONER

## 2017-01-01 PROCEDURE — 25500064 ZZH RX 255 OP 636: Performed by: RADIOLOGY

## 2017-01-01 PROCEDURE — 25000125 ZZHC RX 250: Performed by: INTERNAL MEDICINE

## 2017-01-01 PROCEDURE — 25000128 H RX IP 250 OP 636: Performed by: INTERNAL MEDICINE

## 2017-01-01 PROCEDURE — 72131 CT LUMBAR SPINE W/O DYE: CPT

## 2017-01-01 PROCEDURE — 99207 ZZC CDG-MDM COMPONENT: MEETS HIGH - UP CODED: CPT | Performed by: FAMILY MEDICINE

## 2017-01-01 PROCEDURE — 36415 COLL VENOUS BLD VENIPUNCTURE: CPT | Performed by: FAMILY MEDICINE

## 2017-01-01 PROCEDURE — 99232 SBSQ HOSP IP/OBS MODERATE 35: CPT | Performed by: FAMILY MEDICINE

## 2017-01-01 PROCEDURE — 99310 SBSQ NF CARE HIGH MDM 45: CPT | Performed by: NURSE PRACTITIONER

## 2017-01-01 PROCEDURE — 74176 CT ABD & PELVIS W/O CONTRAST: CPT

## 2017-01-01 PROCEDURE — 99356 ZZC PROLONGED SERV,INPATIENT,1ST HR: CPT | Performed by: FAMILY MEDICINE

## 2017-01-01 PROCEDURE — 0296T ZIO PATCH HOLTER: CPT | Performed by: REHABILITATION PRACTITIONER

## 2017-01-01 PROCEDURE — G0378 HOSPITAL OBSERVATION PER HR: HCPCS

## 2017-01-01 PROCEDURE — 93017 CV STRESS TEST TRACING ONLY: CPT

## 2017-01-01 PROCEDURE — 82272 OCCULT BLD FECES 1-3 TESTS: CPT | Performed by: EMERGENCY MEDICINE

## 2017-01-01 PROCEDURE — 83605 ASSAY OF LACTIC ACID: CPT | Performed by: FAMILY MEDICINE

## 2017-01-01 PROCEDURE — 85018 HEMOGLOBIN: CPT | Performed by: FAMILY MEDICINE

## 2017-01-01 PROCEDURE — 80048 BASIC METABOLIC PNL TOTAL CA: CPT | Performed by: PHYSICIAN ASSISTANT

## 2017-01-01 PROCEDURE — 99214 OFFICE O/P EST MOD 30 MIN: CPT | Performed by: INTERNAL MEDICINE

## 2017-01-01 PROCEDURE — 0298T ZZC EXT ECG > 48HR TO 21 DAY REVIEW AND INTERPRETATN: CPT | Performed by: INTERNAL MEDICINE

## 2017-01-01 PROCEDURE — 93010 ELECTROCARDIOGRAM REPORT: CPT | Performed by: EMERGENCY MEDICINE

## 2017-01-01 PROCEDURE — S5010 5% DEXTROSE AND 0.45% SALINE: HCPCS | Performed by: PEDIATRICS

## 2017-01-01 PROCEDURE — 93005 ELECTROCARDIOGRAM TRACING: CPT

## 2017-01-01 PROCEDURE — 99220 ZZC INITIAL OBSERVATION CARE,LEVL III: CPT | Performed by: INTERNAL MEDICINE

## 2017-01-01 PROCEDURE — 99207 ZZC CDG-MDM COMPONENT: MEETS HIGH - UP CODED: CPT | Performed by: PHYSICIAN ASSISTANT

## 2017-01-01 PROCEDURE — 80048 BASIC METABOLIC PNL TOTAL CA: CPT | Performed by: INTERNAL MEDICINE

## 2017-01-01 PROCEDURE — 93010 ELECTROCARDIOGRAM REPORT: CPT | Mod: Z6 | Performed by: FAMILY MEDICINE

## 2017-01-01 PROCEDURE — 78452 HT MUSCLE IMAGE SPECT MULT: CPT

## 2017-01-01 PROCEDURE — 99309 SBSQ NF CARE MODERATE MDM 30: CPT | Performed by: NURSE PRACTITIONER

## 2017-01-01 PROCEDURE — 94640 AIRWAY INHALATION TREATMENT: CPT

## 2017-01-01 PROCEDURE — 25000128 H RX IP 250 OP 636: Performed by: FAMILY MEDICINE

## 2017-01-01 PROCEDURE — 81001 URINALYSIS AUTO W/SCOPE: CPT | Performed by: EMERGENCY MEDICINE

## 2017-01-01 PROCEDURE — 99222 1ST HOSP IP/OBS MODERATE 55: CPT | Mod: 25 | Performed by: FAMILY MEDICINE

## 2017-01-01 PROCEDURE — 36415 COLL VENOUS BLD VENIPUNCTURE: CPT | Performed by: PHYSICIAN ASSISTANT

## 2017-01-01 PROCEDURE — 99217 ZZC OBSERVATION CARE DISCHARGE: CPT | Performed by: PEDIATRICS

## 2017-01-01 PROCEDURE — S5010 5% DEXTROSE AND 0.45% SALINE: HCPCS | Performed by: FAMILY MEDICINE

## 2017-01-01 PROCEDURE — 82803 BLOOD GASES ANY COMBINATION: CPT | Performed by: FAMILY MEDICINE

## 2017-01-01 PROCEDURE — 85025 COMPLETE CBC W/AUTO DIFF WBC: CPT | Performed by: EMERGENCY MEDICINE

## 2017-01-01 PROCEDURE — 93005 ELECTROCARDIOGRAM TRACING: CPT | Performed by: FAMILY MEDICINE

## 2017-01-01 PROCEDURE — 71010 XR CHEST PORT 1 VW: CPT | Mod: TC

## 2017-01-01 PROCEDURE — A9502 TC99M TETROFOSMIN: HCPCS | Performed by: INTERNAL MEDICINE

## 2017-01-01 PROCEDURE — 71020 XR CHEST 2 VW: CPT | Mod: TC

## 2017-01-01 PROCEDURE — 70450 CT HEAD/BRAIN W/O DYE: CPT

## 2017-01-01 PROCEDURE — 87040 BLOOD CULTURE FOR BACTERIA: CPT | Performed by: FAMILY MEDICINE

## 2017-01-01 PROCEDURE — 87086 URINE CULTURE/COLONY COUNT: CPT | Performed by: FAMILY MEDICINE

## 2017-01-01 PROCEDURE — 34300033 ZZH RX 343: Performed by: INTERNAL MEDICINE

## 2017-01-01 PROCEDURE — 96368 THER/DIAG CONCURRENT INF: CPT | Performed by: FAMILY MEDICINE

## 2017-01-01 PROCEDURE — 96360 HYDRATION IV INFUSION INIT: CPT

## 2017-01-01 PROCEDURE — 83605 ASSAY OF LACTIC ACID: CPT | Performed by: NURSE PRACTITIONER

## 2017-01-01 PROCEDURE — 25000125 ZZHC RX 250: Performed by: RADIOLOGY

## 2017-01-01 PROCEDURE — 73562 X-RAY EXAM OF KNEE 3: CPT | Mod: TC,50

## 2017-01-01 PROCEDURE — 80053 COMPREHEN METABOLIC PANEL: CPT | Performed by: FAMILY MEDICINE

## 2017-01-01 PROCEDURE — 99239 HOSP IP/OBS DSCHRG MGMT >30: CPT | Performed by: PEDIATRICS

## 2017-01-01 PROCEDURE — 93016 CV STRESS TEST SUPVJ ONLY: CPT | Performed by: INTERNAL MEDICINE

## 2017-01-01 PROCEDURE — 87040 BLOOD CULTURE FOR BACTERIA: CPT | Performed by: NURSE PRACTITIONER

## 2017-01-01 PROCEDURE — 80053 COMPREHEN METABOLIC PANEL: CPT | Performed by: EMERGENCY MEDICINE

## 2017-01-01 PROCEDURE — 87081 CULTURE SCREEN ONLY: CPT | Performed by: FAMILY MEDICINE

## 2017-01-01 PROCEDURE — 99233 SBSQ HOSP IP/OBS HIGH 50: CPT | Performed by: PHYSICIAN ASSISTANT

## 2017-01-01 PROCEDURE — 78452 HT MUSCLE IMAGE SPECT MULT: CPT | Mod: 26 | Performed by: INTERNAL MEDICINE

## 2017-01-01 PROCEDURE — 25000128 H RX IP 250 OP 636: Performed by: EMERGENCY MEDICINE

## 2017-01-01 PROCEDURE — 99223 1ST HOSP IP/OBS HIGH 75: CPT | Mod: AI | Performed by: FAMILY MEDICINE

## 2017-01-01 PROCEDURE — 84484 ASSAY OF TROPONIN QUANT: CPT | Performed by: EMERGENCY MEDICINE

## 2017-01-01 PROCEDURE — 93018 CV STRESS TEST I&R ONLY: CPT | Performed by: INTERNAL MEDICINE

## 2017-01-01 RX ORDER — PROCHLORPERAZINE MALEATE 5 MG
5 TABLET ORAL EVERY 6 HOURS PRN
Status: DISCONTINUED | OUTPATIENT
Start: 2017-01-01 | End: 2017-01-01

## 2017-01-01 RX ORDER — MORPHINE SULFATE 20 MG/ML
5-10 SOLUTION ORAL
Qty: 30 ML | Refills: 0 | Status: SHIPPED | OUTPATIENT
Start: 2017-01-01

## 2017-01-01 RX ORDER — ISOSORBIDE MONONITRATE 30 MG/1
30 TABLET, EXTENDED RELEASE ORAL DAILY
Qty: 30 TABLET | Refills: 1 | Status: SHIPPED | OUTPATIENT
Start: 2017-01-01 | End: 2017-01-01

## 2017-01-01 RX ORDER — POTASSIUM CHLORIDE 29.8 MG/ML
20 INJECTION INTRAVENOUS
Status: DISCONTINUED | OUTPATIENT
Start: 2017-01-01 | End: 2017-01-01 | Stop reason: CLARIF

## 2017-01-01 RX ORDER — ACETAMINOPHEN 650 MG/1
650 SUPPOSITORY RECTAL EVERY 4 HOURS PRN
Status: ON HOLD | COMMUNITY
End: 2017-01-01

## 2017-01-01 RX ORDER — BRIMONIDINE TARTRATE 2 MG/ML
1 SOLUTION/ DROPS OPHTHALMIC 2 TIMES DAILY
Status: DISCONTINUED | OUTPATIENT
Start: 2017-01-01 | End: 2017-01-01

## 2017-01-01 RX ORDER — POTASSIUM CHLORIDE 1.5 G/1.58G
20-40 POWDER, FOR SOLUTION ORAL
Status: DISCONTINUED | OUTPATIENT
Start: 2017-01-01 | End: 2017-01-01

## 2017-01-01 RX ORDER — TAMSULOSIN HYDROCHLORIDE 0.4 MG/1
0.4 CAPSULE ORAL DAILY
Status: DISCONTINUED | OUTPATIENT
Start: 2017-01-01 | End: 2017-01-01

## 2017-01-01 RX ORDER — RISPERIDONE 0.5 MG/1
1 TABLET ORAL AT BEDTIME
Status: DISCONTINUED | OUTPATIENT
Start: 2017-01-01 | End: 2017-01-01 | Stop reason: HOSPADM

## 2017-01-01 RX ORDER — ACETAMINOPHEN 650 MG/1
650 SUPPOSITORY RECTAL EVERY 4 HOURS PRN
Qty: 60 SUPPOSITORY | DISCHARGE
Start: 2017-01-01

## 2017-01-01 RX ORDER — FUROSEMIDE 20 MG
20 TABLET ORAL DAILY
Qty: 60 TABLET | Refills: 1 | Status: SHIPPED | OUTPATIENT
Start: 2017-01-01 | End: 2017-01-01 | Stop reason: DRUGHIGH

## 2017-01-01 RX ORDER — LIDOCAINE 40 MG/G
CREAM TOPICAL
Status: DISCONTINUED | OUTPATIENT
Start: 2017-01-01 | End: 2017-01-01 | Stop reason: HOSPADM

## 2017-01-01 RX ORDER — LORAZEPAM 0.5 MG/1
.5-1 TABLET ORAL
Qty: 60 TABLET | Refills: 0 | Status: SHIPPED | OUTPATIENT
Start: 2017-01-01

## 2017-01-01 RX ORDER — IOPAMIDOL 755 MG/ML
100 INJECTION, SOLUTION INTRAVASCULAR ONCE
Status: COMPLETED | OUTPATIENT
Start: 2017-01-01 | End: 2017-01-01

## 2017-01-01 RX ORDER — ATROPINE SULFATE 10 MG/ML
1-2 SOLUTION/ DROPS OPHTHALMIC
Qty: 1 BOTTLE | DISCHARGE
Start: 2017-01-01

## 2017-01-01 RX ORDER — ISOSORBIDE MONONITRATE 30 MG/1
30 TABLET, EXTENDED RELEASE ORAL DAILY
Status: DISCONTINUED | OUTPATIENT
Start: 2017-01-01 | End: 2017-01-01 | Stop reason: HOSPADM

## 2017-01-01 RX ORDER — OLANZAPINE 5 MG/1
5 TABLET, ORALLY DISINTEGRATING ORAL
Status: DISCONTINUED | OUTPATIENT
Start: 2017-01-01 | End: 2017-01-01

## 2017-01-01 RX ORDER — ACETAMINOPHEN 325 MG/1
650 TABLET ORAL EVERY 4 HOURS PRN
Status: DISCONTINUED | OUTPATIENT
Start: 2017-01-01 | End: 2017-01-01 | Stop reason: HOSPADM

## 2017-01-01 RX ORDER — BRIMONIDINE TARTRATE 2 MG/ML
1 SOLUTION/ DROPS OPHTHALMIC 2 TIMES DAILY
Qty: 1 BOTTLE | Status: ON HOLD | DISCHARGE
Start: 2017-01-01 | End: 2017-01-01

## 2017-01-01 RX ORDER — ASPIRIN 81 MG/1
162 TABLET, CHEWABLE ORAL DAILY
Status: DISCONTINUED | OUTPATIENT
Start: 2017-01-01 | End: 2017-01-01 | Stop reason: HOSPADM

## 2017-01-01 RX ORDER — DEXTROSE MONOHYDRATE 50 MG/ML
INJECTION, SOLUTION INTRAVENOUS CONTINUOUS
Status: DISCONTINUED | OUTPATIENT
Start: 2017-01-01 | End: 2017-01-01

## 2017-01-01 RX ORDER — SCOLOPAMINE TRANSDERMAL SYSTEM 1 MG/1
1 PATCH, EXTENDED RELEASE TRANSDERMAL
Status: DISCONTINUED | OUTPATIENT
Start: 2017-01-01 | End: 2017-01-01 | Stop reason: HOSPADM

## 2017-01-01 RX ORDER — POTASSIUM CHLORIDE 1500 MG/1
20-40 TABLET, EXTENDED RELEASE ORAL
Status: DISCONTINUED | OUTPATIENT
Start: 2017-01-01 | End: 2017-01-01

## 2017-01-01 RX ORDER — RISPERIDONE 0.5 MG/1
1 TABLET ORAL 2 TIMES DAILY
Status: DISCONTINUED | OUTPATIENT
Start: 2017-01-01 | End: 2017-01-01 | Stop reason: HOSPADM

## 2017-01-01 RX ORDER — LEVOFLOXACIN 5 MG/ML
750 INJECTION, SOLUTION INTRAVENOUS
Status: DISCONTINUED | OUTPATIENT
Start: 2017-01-01 | End: 2017-01-01

## 2017-01-01 RX ORDER — ONDANSETRON 4 MG/1
4 TABLET, ORALLY DISINTEGRATING ORAL EVERY 6 HOURS PRN
Qty: 120 TABLET | DISCHARGE
Start: 2017-01-01

## 2017-01-01 RX ORDER — NALOXONE HYDROCHLORIDE 0.4 MG/ML
.1-.4 INJECTION, SOLUTION INTRAMUSCULAR; INTRAVENOUS; SUBCUTANEOUS
Status: DISCONTINUED | OUTPATIENT
Start: 2017-01-01 | End: 2017-01-01 | Stop reason: HOSPADM

## 2017-01-01 RX ORDER — SODIUM CHLORIDE 9 MG/ML
1000 INJECTION, SOLUTION INTRAVENOUS CONTINUOUS
Status: DISCONTINUED | OUTPATIENT
Start: 2017-01-01 | End: 2017-01-01

## 2017-01-01 RX ORDER — LEVOFLOXACIN 5 MG/ML
500 INJECTION, SOLUTION INTRAVENOUS
Status: DISCONTINUED | OUTPATIENT
Start: 2017-01-01 | End: 2017-01-01

## 2017-01-01 RX ORDER — SODIUM CHLORIDE, SODIUM LACTATE, POTASSIUM CHLORIDE, CALCIUM CHLORIDE 600; 310; 30; 20 MG/100ML; MG/100ML; MG/100ML; MG/100ML
INJECTION, SOLUTION INTRAVENOUS CONTINUOUS
Status: DISCONTINUED | OUTPATIENT
Start: 2017-01-01 | End: 2017-01-01 | Stop reason: DRUGHIGH

## 2017-01-01 RX ORDER — ONDANSETRON 2 MG/ML
4 INJECTION INTRAMUSCULAR; INTRAVENOUS EVERY 6 HOURS PRN
Status: DISCONTINUED | OUTPATIENT
Start: 2017-01-01 | End: 2017-01-01

## 2017-01-01 RX ORDER — BISACODYL 10 MG
10 SUPPOSITORY, RECTAL RECTAL
Status: DISCONTINUED | OUTPATIENT
Start: 2017-04-23 | End: 2017-01-01 | Stop reason: HOSPADM

## 2017-01-01 RX ORDER — OLANZAPINE 5 MG/1
5 TABLET, ORALLY DISINTEGRATING ORAL EVERY 6 HOURS PRN
Qty: 10 TABLET | Refills: 0 | Status: SHIPPED | OUTPATIENT
Start: 2017-01-01

## 2017-01-01 RX ORDER — LIDOCAINE 40 MG/G
CREAM TOPICAL
Status: DISCONTINUED | OUTPATIENT
Start: 2017-01-01 | End: 2017-01-01

## 2017-01-01 RX ORDER — LORAZEPAM 0.5 MG/1
.5-1 TABLET ORAL
Status: DISCONTINUED | OUTPATIENT
Start: 2017-01-01 | End: 2017-01-01 | Stop reason: HOSPADM

## 2017-01-01 RX ORDER — TERAZOSIN 5 MG/1
CAPSULE ORAL
Qty: 180 CAPSULE | Refills: 1 | Status: SHIPPED | OUTPATIENT
Start: 2017-01-01 | End: 2017-01-01

## 2017-01-01 RX ORDER — ACETAMINOPHEN 650 MG/1
650 SUPPOSITORY RECTAL EVERY 6 HOURS PRN
Status: DISCONTINUED | OUTPATIENT
Start: 2017-01-01 | End: 2017-01-01 | Stop reason: HOSPADM

## 2017-01-01 RX ORDER — GLYCOPYRROLATE 0.2 MG/ML
.2-.4 INJECTION, SOLUTION INTRAMUSCULAR; INTRAVENOUS EVERY 4 HOURS PRN
Status: DISCONTINUED | OUTPATIENT
Start: 2017-01-01 | End: 2017-01-01

## 2017-01-01 RX ORDER — MORPHINE SULFATE 2 MG/ML
1-2 INJECTION, SOLUTION INTRAMUSCULAR; INTRAVENOUS
Status: DISCONTINUED | OUTPATIENT
Start: 2017-01-01 | End: 2017-01-01

## 2017-01-01 RX ORDER — ALBUTEROL SULFATE 0.83 MG/ML
1 SOLUTION RESPIRATORY (INHALATION) 2 TIMES DAILY PRN
COMMUNITY
End: 2017-01-01

## 2017-01-01 RX ORDER — NALOXONE HYDROCHLORIDE 0.4 MG/ML
.1-.4 INJECTION, SOLUTION INTRAMUSCULAR; INTRAVENOUS; SUBCUTANEOUS
Status: DISCONTINUED | OUTPATIENT
Start: 2017-01-01 | End: 2017-01-01

## 2017-01-01 RX ORDER — POTASSIUM CL/LIDO/0.9 % NACL 10MEQ/0.1L
10 INTRAVENOUS SOLUTION, PIGGYBACK (ML) INTRAVENOUS
Status: DISCONTINUED | OUTPATIENT
Start: 2017-01-01 | End: 2017-01-01

## 2017-01-01 RX ORDER — DORZOLAMIDE HYDROCHLORIDE AND TIMOLOL MALEATE 20; 5 MG/ML; MG/ML
1 SOLUTION/ DROPS OPHTHALMIC 2 TIMES DAILY
Qty: 1 BOTTLE | Status: ON HOLD | DISCHARGE
Start: 2017-01-01 | End: 2017-01-01

## 2017-01-01 RX ORDER — GLYCOPYRROLATE 1 MG/1
2 TABLET ORAL EVERY 4 HOURS PRN
Status: DISCONTINUED | OUTPATIENT
Start: 2017-01-01 | End: 2017-01-01 | Stop reason: HOSPADM

## 2017-01-01 RX ORDER — PROCHLORPERAZINE 25 MG
12.5 SUPPOSITORY, RECTAL RECTAL EVERY 12 HOURS PRN
Status: DISCONTINUED | OUTPATIENT
Start: 2017-01-01 | End: 2017-01-01 | Stop reason: HOSPADM

## 2017-01-01 RX ORDER — ATROPINE SULFATE 10 MG/ML
1-2 SOLUTION/ DROPS OPHTHALMIC
Status: DISCONTINUED | OUTPATIENT
Start: 2017-01-01 | End: 2017-01-01 | Stop reason: HOSPADM

## 2017-01-01 RX ORDER — RISPERIDONE 1 MG/1
1 TABLET ORAL 2 TIMES DAILY
Qty: 60 TABLET | Refills: 3 | Status: SHIPPED | OUTPATIENT
Start: 2017-01-01 | End: 2017-01-01 | Stop reason: DRUGHIGH

## 2017-01-01 RX ORDER — CEFAZOLIN SODIUM 1 G/50ML
1250 SOLUTION INTRAVENOUS
Status: DISCONTINUED | OUTPATIENT
Start: 2017-01-01 | End: 2017-01-01

## 2017-01-01 RX ORDER — MORPHINE SULFATE 20 MG/ML
5-10 SOLUTION ORAL
Status: DISCONTINUED | OUTPATIENT
Start: 2017-01-01 | End: 2017-01-01 | Stop reason: HOSPADM

## 2017-01-01 RX ORDER — RISPERIDONE 1 MG/1
1 TABLET ORAL DAILY PRN
COMMUNITY
End: 2017-01-01

## 2017-01-01 RX ORDER — CEFAZOLIN SODIUM 1 G/50ML
1250 SOLUTION INTRAVENOUS EVERY 24 HOURS
Status: DISCONTINUED | OUTPATIENT
Start: 2017-01-01 | End: 2017-01-01

## 2017-01-01 RX ORDER — ACETAMINOPHEN 325 MG/1
650 TABLET ORAL EVERY 4 HOURS PRN
Status: DISCONTINUED | OUTPATIENT
Start: 2017-01-01 | End: 2017-01-01

## 2017-01-01 RX ORDER — ONDANSETRON 4 MG/1
4 TABLET, ORALLY DISINTEGRATING ORAL EVERY 6 HOURS PRN
Status: DISCONTINUED | OUTPATIENT
Start: 2017-01-01 | End: 2017-01-01 | Stop reason: HOSPADM

## 2017-01-01 RX ORDER — ASPIRIN 81 MG/1
162 TABLET, CHEWABLE ORAL DAILY
Status: DISCONTINUED | OUTPATIENT
Start: 2017-01-01 | End: 2017-01-01

## 2017-01-01 RX ORDER — NYSTATIN 100000/ML
100000 SUSPENSION, ORAL (FINAL DOSE FORM) ORAL 4 TIMES DAILY PRN
Status: ON HOLD | COMMUNITY
End: 2017-01-01

## 2017-01-01 RX ORDER — ISOSORBIDE MONONITRATE 30 MG/1
TABLET, EXTENDED RELEASE ORAL
Qty: 30 TABLET | Refills: 5 | Status: SHIPPED | OUTPATIENT
Start: 2017-01-01 | End: 2017-01-01

## 2017-01-01 RX ORDER — PROCHLORPERAZINE 25 MG
12.5 SUPPOSITORY, RECTAL RECTAL EVERY 12 HOURS PRN
Qty: 60 SUPPOSITORY | DISCHARGE
Start: 2017-01-01

## 2017-01-01 RX ORDER — FUROSEMIDE 20 MG
20 TABLET ORAL DAILY
Status: DISCONTINUED | OUTPATIENT
Start: 2017-01-01 | End: 2017-01-01 | Stop reason: HOSPADM

## 2017-01-01 RX ORDER — ATROPINE SULFATE 10 MG/ML
2 SOLUTION/ DROPS OPHTHALMIC
Status: ON HOLD | COMMUNITY
End: 2017-01-01

## 2017-01-01 RX ORDER — IPRATROPIUM BROMIDE AND ALBUTEROL SULFATE 2.5; .5 MG/3ML; MG/3ML
3 SOLUTION RESPIRATORY (INHALATION) ONCE
Status: COMPLETED | OUTPATIENT
Start: 2017-01-01 | End: 2017-01-01

## 2017-01-01 RX ORDER — DORZOLAMIDE HYDROCHLORIDE AND TIMOLOL MALEATE 20; 5 MG/ML; MG/ML
1 SOLUTION/ DROPS OPHTHALMIC 2 TIMES DAILY
Status: DISCONTINUED | OUTPATIENT
Start: 2017-01-01 | End: 2017-01-01 | Stop reason: HOSPADM

## 2017-01-01 RX ORDER — BRIMONIDINE TARTRATE 2 MG/ML
1 SOLUTION/ DROPS OPHTHALMIC 2 TIMES DAILY
Status: DISCONTINUED | OUTPATIENT
Start: 2017-01-01 | End: 2017-01-01 | Stop reason: HOSPADM

## 2017-01-01 RX ORDER — CEFTRIAXONE 1 G/1
1000 INJECTION, POWDER, FOR SOLUTION INTRAMUSCULAR; INTRAVENOUS DAILY
Status: ON HOLD | COMMUNITY
End: 2017-01-01

## 2017-01-01 RX ORDER — GLYCOPYRROLATE 1 MG/1
2 TABLET ORAL 3 TIMES DAILY
Status: DISCONTINUED | OUTPATIENT
Start: 2017-01-01 | End: 2017-01-01

## 2017-01-01 RX ORDER — MORPHINE SULFATE 10 MG/5ML
5-10 SOLUTION ORAL
Status: DISCONTINUED | OUTPATIENT
Start: 2017-01-01 | End: 2017-01-01

## 2017-01-01 RX ORDER — SODIUM CHLORIDE 9 MG/ML
INJECTION, SOLUTION INTRAVENOUS CONTINUOUS
Status: DISCONTINUED | OUTPATIENT
Start: 2017-01-01 | End: 2017-01-01 | Stop reason: HOSPADM

## 2017-01-01 RX ORDER — MINERAL OIL/HYDROPHIL PETROLAT
OINTMENT (GRAM) TOPICAL EVERY 8 HOURS PRN
DISCHARGE
Start: 2017-01-01

## 2017-01-01 RX ORDER — BISACODYL 10 MG
10 SUPPOSITORY, RECTAL RECTAL
Qty: 30 SUPPOSITORY | DISCHARGE
Start: 2017-04-23

## 2017-01-01 RX ORDER — ACETAMINOPHEN 650 MG/1
650 SUPPOSITORY RECTAL EVERY 4 HOURS PRN
Status: DISCONTINUED | OUTPATIENT
Start: 2017-01-01 | End: 2017-01-01

## 2017-01-01 RX ORDER — FUROSEMIDE 20 MG
20 TABLET ORAL 2 TIMES DAILY
Qty: 60 TABLET | Refills: 0 | Status: ON HOLD | OUTPATIENT
Start: 2017-01-01 | End: 2017-01-01

## 2017-01-01 RX ORDER — LORAZEPAM 0.5 MG/1
.5-1 TABLET ORAL
Status: DISCONTINUED | OUTPATIENT
Start: 2017-01-01 | End: 2017-01-01

## 2017-01-01 RX ORDER — ALBUTEROL SULFATE 0.83 MG/ML
3 SOLUTION RESPIRATORY (INHALATION)
Status: DISCONTINUED | OUTPATIENT
Start: 2017-01-01 | End: 2017-01-01

## 2017-01-01 RX ORDER — POTASSIUM CHLORIDE 7.45 MG/ML
10 INJECTION INTRAVENOUS
Status: DISCONTINUED | OUTPATIENT
Start: 2017-01-01 | End: 2017-01-01

## 2017-01-01 RX ORDER — RISPERIDONE 0.5 MG/1
1 TABLET ORAL AT BEDTIME
Qty: 60 TABLET | Refills: 0 | Status: SHIPPED | OUTPATIENT
Start: 2017-01-01 | End: 2017-01-01

## 2017-01-01 RX ORDER — ONDANSETRON 2 MG/ML
4 INJECTION INTRAMUSCULAR; INTRAVENOUS EVERY 6 HOURS PRN
Status: DISCONTINUED | OUTPATIENT
Start: 2017-01-01 | End: 2017-01-01 | Stop reason: HOSPADM

## 2017-01-01 RX ORDER — LEVOFLOXACIN 5 MG/ML
750 INJECTION, SOLUTION INTRAVENOUS ONCE
Status: COMPLETED | OUTPATIENT
Start: 2017-01-01 | End: 2017-01-01

## 2017-01-01 RX ORDER — MINERAL OIL/HYDROPHIL PETROLAT
OINTMENT (GRAM) TOPICAL EVERY 8 HOURS PRN
Status: DISCONTINUED | OUTPATIENT
Start: 2017-01-01 | End: 2017-01-01 | Stop reason: HOSPADM

## 2017-01-01 RX ORDER — RISPERIDONE 1 MG/1
1 TABLET ORAL EVERY EVENING
Qty: 30 TABLET | Refills: 0 | Status: SHIPPED | OUTPATIENT
Start: 2017-01-01 | End: 2017-01-01

## 2017-01-01 RX ORDER — DORZOLAMIDE HYDROCHLORIDE AND TIMOLOL MALEATE 20; 5 MG/ML; MG/ML
1 SOLUTION/ DROPS OPHTHALMIC 2 TIMES DAILY
Status: DISCONTINUED | OUTPATIENT
Start: 2017-01-01 | End: 2017-01-01

## 2017-01-01 RX ORDER — AMPICILLIN AND SULBACTAM 2; 1 G/1; G/1
3 INJECTION, POWDER, FOR SOLUTION INTRAMUSCULAR; INTRAVENOUS EVERY 6 HOURS
Status: DISCONTINUED | OUTPATIENT
Start: 2017-01-01 | End: 2017-01-01 | Stop reason: HOSPADM

## 2017-01-01 RX ORDER — REGADENOSON 0.08 MG/ML
0.4 INJECTION, SOLUTION INTRAVENOUS ONCE
Status: COMPLETED | OUTPATIENT
Start: 2017-01-01 | End: 2017-01-01

## 2017-01-01 RX ORDER — LORAZEPAM 2 MG/ML
.5-1 INJECTION INTRAMUSCULAR
Status: DISCONTINUED | OUTPATIENT
Start: 2017-01-01 | End: 2017-01-01

## 2017-01-01 RX ADMIN — POTASSIUM CHLORIDE 10 MEQ: 14.9 INJECTION, SOLUTION, CONCENTRATE PARENTERAL at 20:32

## 2017-01-01 RX ADMIN — MORPHINE SULFATE 10 MG: 100 SOLUTION ORAL at 20:59

## 2017-01-01 RX ADMIN — LORAZEPAM 0.5 MG: 0.5 TABLET ORAL at 15:06

## 2017-01-01 RX ADMIN — DORZOLAMIDE HYDROCHLORIDE AND TIMOLOL MALEATE 1 DROP: 20; 5 SOLUTION/ DROPS OPHTHALMIC at 21:03

## 2017-01-01 RX ADMIN — ASPIRIN 81 MG 162 MG: 81 TABLET ORAL at 09:49

## 2017-01-01 RX ADMIN — DORZOLAMIDE HYDROCHLORIDE AND TIMOLOL MALEATE 1 DROP: 20; 5 SOLUTION/ DROPS OPHTHALMIC at 21:59

## 2017-01-01 RX ADMIN — RISPERIDONE 1 MG: 0.5 TABLET ORAL at 21:03

## 2017-01-01 RX ADMIN — TAZOBACTAM SODIUM AND PIPERACILLIN SODIUM 2.25 G: 250; 2 INJECTION, SOLUTION INTRAVENOUS at 07:17

## 2017-01-01 RX ADMIN — MORPHINE SULFATE 5 MG: 100 SOLUTION ORAL at 11:24

## 2017-01-01 RX ADMIN — DEXTROSE MONOHYDRATE: 50 INJECTION, SOLUTION INTRAVENOUS at 08:53

## 2017-01-01 RX ADMIN — BRIMONIDINE TARTRATE 1 DROP: 2 SOLUTION/ DROPS OPHTHALMIC at 21:00

## 2017-01-01 RX ADMIN — ATROPINE SULFATE 2 DROP: 10 SOLUTION/ DROPS OPHTHALMIC at 18:09

## 2017-01-01 RX ADMIN — REGADENOSON 0.4 MG: 0.08 INJECTION, SOLUTION INTRAVENOUS at 11:08

## 2017-01-01 RX ADMIN — POTASSIUM CHLORIDE 10 MEQ: 14.9 INJECTION, SOLUTION, CONCENTRATE PARENTERAL at 18:04

## 2017-01-01 RX ADMIN — AMPICILLIN SODIUM AND SULBACTAM SODIUM 3 G: 2; 1 INJECTION, POWDER, FOR SOLUTION INTRAMUSCULAR; INTRAVENOUS at 05:55

## 2017-01-01 RX ADMIN — DORZOLAMIDE HYDROCHLORIDE AND TIMOLOL MALEATE 1 DROP: 20; 5 SOLUTION/ DROPS OPHTHALMIC at 09:50

## 2017-01-01 RX ADMIN — DORZOLAMIDE HYDROCHLORIDE AND TIMOLOL MALEATE 1 DROP: 20; 5 SOLUTION/ DROPS OPHTHALMIC at 08:39

## 2017-01-01 RX ADMIN — MORPHINE SULFATE 10 MG: 100 SOLUTION ORAL at 08:22

## 2017-01-01 RX ADMIN — FUROSEMIDE 20 MG: 20 TABLET ORAL at 08:39

## 2017-01-01 RX ADMIN — ACETAMINOPHEN 650 MG: 650 SUPPOSITORY RECTAL at 20:20

## 2017-01-01 RX ADMIN — POTASSIUM CHLORIDE 10 MEQ: 14.9 INJECTION, SOLUTION, CONCENTRATE PARENTERAL at 07:54

## 2017-01-01 RX ADMIN — SODIUM CHLORIDE 1000 ML: 9 INJECTION, SOLUTION INTRAVENOUS at 15:03

## 2017-01-01 RX ADMIN — TAZOBACTAM SODIUM AND PIPERACILLIN SODIUM 2.25 G: 250; 2 INJECTION, SOLUTION INTRAVENOUS at 00:20

## 2017-01-01 RX ADMIN — ACETAMINOPHEN 650 MG: 650 SUPPOSITORY RECTAL at 02:27

## 2017-01-01 RX ADMIN — TETROFOSMIN 31 MCI.: 0.23 INJECTION, POWDER, LYOPHILIZED, FOR SOLUTION INTRAVENOUS at 11:21

## 2017-01-01 RX ADMIN — POTASSIUM CHLORIDE 10 MEQ: 14.9 INJECTION, SOLUTION, CONCENTRATE PARENTERAL at 21:34

## 2017-01-01 RX ADMIN — OLANZAPINE 5 MG: 5 TABLET, ORALLY DISINTEGRATING ORAL at 22:02

## 2017-01-01 RX ADMIN — TAZOBACTAM SODIUM AND PIPERACILLIN SODIUM 2.25 G: 250; 2 INJECTION, SOLUTION INTRAVENOUS at 05:28

## 2017-01-01 RX ADMIN — AMPICILLIN SODIUM AND SULBACTAM SODIUM 3 G: 2; 1 INJECTION, POWDER, FOR SOLUTION INTRAMUSCULAR; INTRAVENOUS at 17:58

## 2017-01-01 RX ADMIN — GLYCOPYRROLATE 0.4 MG: 0.2 INJECTION, SOLUTION INTRAMUSCULAR; INTRAVENOUS at 18:19

## 2017-01-01 RX ADMIN — VANCOMYCIN HYDROCHLORIDE 1250 MG: 1 INJECTION, POWDER, LYOPHILIZED, FOR SOLUTION INTRAVENOUS at 22:28

## 2017-01-01 RX ADMIN — POTASSIUM CHLORIDE: 149 INJECTION, SOLUTION, CONCENTRATE INTRAVENOUS at 10:03

## 2017-01-01 RX ADMIN — TAZOBACTAM SODIUM AND PIPERACILLIN SODIUM 2.25 G: 250; 2 INJECTION, SOLUTION INTRAVENOUS at 23:48

## 2017-01-01 RX ADMIN — SODIUM CHLORIDE, POTASSIUM CHLORIDE, SODIUM LACTATE AND CALCIUM CHLORIDE: 600; 310; 30; 20 INJECTION, SOLUTION INTRAVENOUS at 02:47

## 2017-01-01 RX ADMIN — RISPERIDONE 1 MG: 0.5 TABLET ORAL at 08:39

## 2017-01-01 RX ADMIN — BRIMONIDINE TARTRATE 1 DROP: 2 SOLUTION/ DROPS OPHTHALMIC at 08:39

## 2017-01-01 RX ADMIN — DEXTROSE MONOHYDRATE AND SODIUM CHLORIDE: 5; .45 INJECTION, SOLUTION INTRAVENOUS at 06:44

## 2017-01-01 RX ADMIN — MORPHINE SULFATE 1 MG: 2 INJECTION, SOLUTION INTRAMUSCULAR; INTRAVENOUS at 10:04

## 2017-01-01 RX ADMIN — SODIUM CHLORIDE 1000 ML: 9 INJECTION, SOLUTION INTRAVENOUS at 13:33

## 2017-01-01 RX ADMIN — ASPIRIN 81 MG 162 MG: 81 TABLET ORAL at 08:39

## 2017-01-01 RX ADMIN — RISPERIDONE 1 MG: 0.5 TABLET ORAL at 00:00

## 2017-01-01 RX ADMIN — POTASSIUM CHLORIDE 10 MEQ: 14.9 INJECTION, SOLUTION, CONCENTRATE PARENTERAL at 08:56

## 2017-01-01 RX ADMIN — ACETAMINOPHEN 650 MG: 650 SUPPOSITORY RECTAL at 12:48

## 2017-01-01 RX ADMIN — BRIMONIDINE TARTRATE 1 DROP: 2 SOLUTION/ DROPS OPHTHALMIC at 20:20

## 2017-01-01 RX ADMIN — AMPICILLIN SODIUM AND SULBACTAM SODIUM 3 G: 2; 1 INJECTION, POWDER, FOR SOLUTION INTRAMUSCULAR; INTRAVENOUS at 12:23

## 2017-01-01 RX ADMIN — AMPICILLIN SODIUM AND SULBACTAM SODIUM 3 G: 2; 1 INJECTION, POWDER, FOR SOLUTION INTRAMUSCULAR; INTRAVENOUS at 11:45

## 2017-01-01 RX ADMIN — TAZOBACTAM SODIUM AND PIPERACILLIN SODIUM 2.25 G: 250; 2 INJECTION, SOLUTION INTRAVENOUS at 05:49

## 2017-01-01 RX ADMIN — DORZOLAMIDE HYDROCHLORIDE AND TIMOLOL MALEATE 1 DROP: 20; 5 SOLUTION/ DROPS OPHTHALMIC at 09:03

## 2017-01-01 RX ADMIN — TAZOBACTAM SODIUM AND PIPERACILLIN SODIUM 2.25 G: 250; 2 INJECTION, SOLUTION INTRAVENOUS at 00:00

## 2017-01-01 RX ADMIN — SODIUM CHLORIDE 500 ML: 9 INJECTION, SOLUTION INTRAVENOUS at 00:07

## 2017-01-01 RX ADMIN — LIDOCAINE HYDROCHLORIDE 20 ML: 20 JELLY TOPICAL at 12:16

## 2017-01-01 RX ADMIN — Medication 1 SPRAY: at 18:45

## 2017-01-01 RX ADMIN — SODIUM CHLORIDE 500 ML: 9 INJECTION, SOLUTION INTRAVENOUS at 16:14

## 2017-01-01 RX ADMIN — IOPAMIDOL 80 ML: 755 INJECTION, SOLUTION INTRAVENOUS at 12:50

## 2017-01-01 RX ADMIN — TAZOBACTAM SODIUM AND PIPERACILLIN SODIUM 2.25 G: 250; 2 INJECTION, SOLUTION INTRAVENOUS at 00:23

## 2017-01-01 RX ADMIN — GLYCOPYRROLATE 0.4 MG: 0.2 INJECTION, SOLUTION INTRAMUSCULAR; INTRAVENOUS at 12:22

## 2017-01-01 RX ADMIN — DORZOLAMIDE HYDROCHLORIDE AND TIMOLOL MALEATE 1 DROP: 20; 5 SOLUTION/ DROPS OPHTHALMIC at 20:30

## 2017-01-01 RX ADMIN — ATROPINE SULFATE 2 DROP: 10 SOLUTION/ DROPS OPHTHALMIC at 20:10

## 2017-01-01 RX ADMIN — POTASSIUM CHLORIDE 10 MEQ: 14.9 INJECTION, SOLUTION, CONCENTRATE PARENTERAL at 10:05

## 2017-01-01 RX ADMIN — BRIMONIDINE TARTRATE 1 DROP: 2 SOLUTION/ DROPS OPHTHALMIC at 10:39

## 2017-01-01 RX ADMIN — DORZOLAMIDE HYDROCHLORIDE AND TIMOLOL MALEATE 1 DROP: 20; 5 SOLUTION/ DROPS OPHTHALMIC at 20:01

## 2017-01-01 RX ADMIN — BRIMONIDINE TARTRATE 1 DROP: 2 SOLUTION/ DROPS OPHTHALMIC at 09:03

## 2017-01-01 RX ADMIN — BRIMONIDINE TARTRATE 1 DROP: 2 SOLUTION/ DROPS OPHTHALMIC at 09:50

## 2017-01-01 RX ADMIN — LORAZEPAM 1 MG: 0.5 TABLET ORAL at 12:43

## 2017-01-01 RX ADMIN — MORPHINE SULFATE 5 MG: 100 SOLUTION ORAL at 13:46

## 2017-01-01 RX ADMIN — LORAZEPAM 0.5 MG: 0.5 TABLET ORAL at 20:59

## 2017-01-01 RX ADMIN — AMPICILLIN SODIUM AND SULBACTAM SODIUM 3 G: 2; 1 INJECTION, POWDER, FOR SOLUTION INTRAMUSCULAR; INTRAVENOUS at 01:04

## 2017-01-01 RX ADMIN — RISPERIDONE 1 MG: 0.5 TABLET ORAL at 20:29

## 2017-01-01 RX ADMIN — TAZOBACTAM SODIUM AND PIPERACILLIN SODIUM 2.25 G: 250; 2 INJECTION, SOLUTION INTRAVENOUS at 17:42

## 2017-01-01 RX ADMIN — BRIMONIDINE TARTRATE 1 DROP: 2 SOLUTION/ DROPS OPHTHALMIC at 10:02

## 2017-01-01 RX ADMIN — ISOSORBIDE MONONITRATE 30 MG: 30 TABLET, EXTENDED RELEASE ORAL at 08:39

## 2017-01-01 RX ADMIN — RISPERIDONE 1 MG: 0.5 TABLET ORAL at 22:00

## 2017-01-01 RX ADMIN — DORZOLAMIDE HYDROCHLORIDE AND TIMOLOL MALEATE 1 DROP: 20; 5 SOLUTION/ DROPS OPHTHALMIC at 10:37

## 2017-01-01 RX ADMIN — POTASSIUM CHLORIDE 10 MEQ: 14.9 INJECTION, SOLUTION, CONCENTRATE PARENTERAL at 06:48

## 2017-01-01 RX ADMIN — MORPHINE SULFATE 10 MG: 100 SOLUTION ORAL at 12:44

## 2017-01-01 RX ADMIN — AMPICILLIN SODIUM AND SULBACTAM SODIUM 3 G: 2; 1 INJECTION, POWDER, FOR SOLUTION INTRAMUSCULAR; INTRAVENOUS at 00:05

## 2017-01-01 RX ADMIN — AMPICILLIN SODIUM AND SULBACTAM SODIUM 3 G: 2; 1 INJECTION, POWDER, FOR SOLUTION INTRAMUSCULAR; INTRAVENOUS at 23:55

## 2017-01-01 RX ADMIN — ATROPINE SULFATE 2 DROP: 10 SOLUTION/ DROPS OPHTHALMIC at 15:10

## 2017-01-01 RX ADMIN — TAZOBACTAM SODIUM AND PIPERACILLIN SODIUM 2.25 G: 250; 2 INJECTION, SOLUTION INTRAVENOUS at 17:36

## 2017-01-01 RX ADMIN — ATROPINE SULFATE 2 DROP: 10 SOLUTION/ DROPS OPHTHALMIC at 17:11

## 2017-01-01 RX ADMIN — ISOSORBIDE MONONITRATE 30 MG: 30 TABLET, EXTENDED RELEASE ORAL at 09:49

## 2017-01-01 RX ADMIN — AMPICILLIN SODIUM AND SULBACTAM SODIUM 3 G: 2; 1 INJECTION, POWDER, FOR SOLUTION INTRAMUSCULAR; INTRAVENOUS at 18:45

## 2017-01-01 RX ADMIN — ASPIRIN 81 MG 162 MG: 81 TABLET ORAL at 10:01

## 2017-01-01 RX ADMIN — AMPICILLIN SODIUM AND SULBACTAM SODIUM 3 G: 2; 1 INJECTION, POWDER, FOR SOLUTION INTRAMUSCULAR; INTRAVENOUS at 12:29

## 2017-01-01 RX ADMIN — SODIUM CHLORIDE 1000 ML: 9 INJECTION, SOLUTION INTRAVENOUS at 23:27

## 2017-01-01 RX ADMIN — DORZOLAMIDE HYDROCHLORIDE AND TIMOLOL MALEATE 1 DROP: 20; 5 SOLUTION/ DROPS OPHTHALMIC at 20:50

## 2017-01-01 RX ADMIN — RISPERIDONE 1 MG: 0.5 TABLET ORAL at 09:48

## 2017-01-01 RX ADMIN — GLYCOPYRROLATE 0.4 MG: 0.2 INJECTION, SOLUTION INTRAMUSCULAR; INTRAVENOUS at 07:49

## 2017-01-01 RX ADMIN — BRIMONIDINE TARTRATE 1 DROP: 2 SOLUTION/ DROPS OPHTHALMIC at 09:30

## 2017-01-01 RX ADMIN — BRIMONIDINE TARTRATE 1 DROP: 2 SOLUTION/ DROPS OPHTHALMIC at 20:01

## 2017-01-01 RX ADMIN — DEXTROSE MONOHYDRATE: 50 INJECTION, SOLUTION INTRAVENOUS at 15:01

## 2017-01-01 RX ADMIN — LEVOFLOXACIN 750 MG: 5 INJECTION, SOLUTION INTRAVENOUS at 00:25

## 2017-01-01 RX ADMIN — DORZOLAMIDE HYDROCHLORIDE AND TIMOLOL MALEATE 1 DROP: 20; 5 SOLUTION/ DROPS OPHTHALMIC at 20:20

## 2017-01-01 RX ADMIN — AMPICILLIN SODIUM AND SULBACTAM SODIUM 3 G: 2; 1 INJECTION, POWDER, FOR SOLUTION INTRAMUSCULAR; INTRAVENOUS at 06:41

## 2017-01-01 RX ADMIN — SODIUM CHLORIDE: 9 INJECTION, SOLUTION INTRAVENOUS at 00:59

## 2017-01-01 RX ADMIN — AMPICILLIN SODIUM AND SULBACTAM SODIUM 3 G: 2; 1 INJECTION, POWDER, FOR SOLUTION INTRAMUSCULAR; INTRAVENOUS at 06:30

## 2017-01-01 RX ADMIN — TAZOBACTAM SODIUM AND PIPERACILLIN SODIUM 2.25 G: 250; 2 INJECTION, SOLUTION INTRAVENOUS at 17:39

## 2017-01-01 RX ADMIN — BRIMONIDINE TARTRATE 1 DROP: 2 SOLUTION/ DROPS OPHTHALMIC at 21:59

## 2017-01-01 RX ADMIN — FUROSEMIDE 20 MG: 20 TABLET ORAL at 09:49

## 2017-01-01 RX ADMIN — AMPICILLIN SODIUM AND SULBACTAM SODIUM 3 G: 2; 1 INJECTION, POWDER, FOR SOLUTION INTRAMUSCULAR; INTRAVENOUS at 11:43

## 2017-01-01 RX ADMIN — ACETAMINOPHEN 650 MG: 650 SUPPOSITORY RECTAL at 05:23

## 2017-01-01 RX ADMIN — FUROSEMIDE 20 MG: 20 TABLET ORAL at 10:01

## 2017-01-01 RX ADMIN — BRIMONIDINE TARTRATE 1 DROP: 2 SOLUTION/ DROPS OPHTHALMIC at 20:30

## 2017-01-01 RX ADMIN — MORPHINE SULFATE 10 MG: 100 SOLUTION ORAL at 10:34

## 2017-01-01 RX ADMIN — TAZOBACTAM SODIUM AND PIPERACILLIN SODIUM 2.25 G: 250; 2 INJECTION, SOLUTION INTRAVENOUS at 12:41

## 2017-01-01 RX ADMIN — ATROPINE SULFATE 2 DROP: 10 SOLUTION/ DROPS OPHTHALMIC at 10:37

## 2017-01-01 RX ADMIN — POTASSIUM CHLORIDE: 149 INJECTION, SOLUTION, CONCENTRATE INTRAVENOUS at 02:26

## 2017-01-01 RX ADMIN — ACETAMINOPHEN 650 MG: 650 SUPPOSITORY RECTAL at 22:00

## 2017-01-01 RX ADMIN — DORZOLAMIDE HYDROCHLORIDE AND TIMOLOL MALEATE 1 DROP: 20; 5 SOLUTION/ DROPS OPHTHALMIC at 10:02

## 2017-01-01 RX ADMIN — POTASSIUM CHLORIDE 10 MEQ: 14.9 INJECTION, SOLUTION, CONCENTRATE PARENTERAL at 11:21

## 2017-01-01 RX ADMIN — TAZOBACTAM SODIUM AND PIPERACILLIN SODIUM 2.25 G: 250; 2 INJECTION, SOLUTION INTRAVENOUS at 12:20

## 2017-01-01 RX ADMIN — ACETAMINOPHEN 650 MG: 650 SUPPOSITORY RECTAL at 06:59

## 2017-01-01 RX ADMIN — MORPHINE SULFATE 5 MG: 100 SOLUTION ORAL at 15:42

## 2017-01-01 RX ADMIN — DEXTROSE MONOHYDRATE AND SODIUM CHLORIDE: 5; .45 INJECTION, SOLUTION INTRAVENOUS at 15:49

## 2017-01-01 RX ADMIN — ISOSORBIDE MONONITRATE 30 MG: 30 TABLET, EXTENDED RELEASE ORAL at 10:01

## 2017-01-01 RX ADMIN — DORZOLAMIDE HYDROCHLORIDE AND TIMOLOL MALEATE 1 DROP: 20; 5 SOLUTION/ DROPS OPHTHALMIC at 09:30

## 2017-01-01 RX ADMIN — ACETAMINOPHEN 650 MG: 650 SUPPOSITORY RECTAL at 00:35

## 2017-01-01 RX ADMIN — RISPERIDONE 1 MG: 0.5 TABLET ORAL at 10:01

## 2017-01-01 RX ADMIN — MORPHINE SULFATE 10 MG: 100 SOLUTION ORAL at 06:34

## 2017-01-01 RX ADMIN — SODIUM CHLORIDE 500 ML: 9 INJECTION, SOLUTION INTRAVENOUS at 23:26

## 2017-01-01 RX ADMIN — GLYCOPYRROLATE 2 MG: 1 TABLET ORAL at 13:30

## 2017-01-01 RX ADMIN — SODIUM CHLORIDE 89 ML: 9 INJECTION, SOLUTION INTRAVENOUS at 12:50

## 2017-01-01 RX ADMIN — POTASSIUM CHLORIDE 10 MEQ: 14.9 INJECTION, SOLUTION, CONCENTRATE PARENTERAL at 19:27

## 2017-01-01 RX ADMIN — SODIUM CHLORIDE 1000 ML: 9 INJECTION, SOLUTION INTRAVENOUS at 00:33

## 2017-01-01 RX ADMIN — ASPIRIN 325 MG: 325 TABLET, COATED ORAL at 09:56

## 2017-01-01 RX ADMIN — LIDOCAINE HYDROCHLORIDE: 20 JELLY TOPICAL at 23:00

## 2017-01-01 RX ADMIN — LEVOFLOXACIN 750 MG: 5 INJECTION, SOLUTION INTRAVENOUS at 19:57

## 2017-01-01 RX ADMIN — MORPHINE SULFATE 5 MG: 100 SOLUTION ORAL at 16:35

## 2017-01-01 RX ADMIN — VANCOMYCIN HYDROCHLORIDE 1250 MG: 1 INJECTION, POWDER, LYOPHILIZED, FOR SOLUTION INTRAVENOUS at 22:02

## 2017-01-01 RX ADMIN — LORAZEPAM 1 MG: 0.5 TABLET ORAL at 08:22

## 2017-01-01 RX ADMIN — AMPICILLIN SODIUM AND SULBACTAM SODIUM 3 G: 2; 1 INJECTION, POWDER, FOR SOLUTION INTRAMUSCULAR; INTRAVENOUS at 18:33

## 2017-01-01 RX ADMIN — IPRATROPIUM BROMIDE AND ALBUTEROL SULFATE 3 ML: .5; 3 SOLUTION RESPIRATORY (INHALATION) at 12:14

## 2017-01-01 RX ADMIN — Medication 1 SPRAY: at 20:10

## 2017-01-01 RX ADMIN — DEXTROSE MONOHYDRATE AND SODIUM CHLORIDE: 5; .45 INJECTION, SOLUTION INTRAVENOUS at 03:00

## 2017-01-01 RX ADMIN — LORAZEPAM 0.5 MG: 0.5 TABLET ORAL at 18:05

## 2017-01-01 RX ADMIN — POTASSIUM CHLORIDE 10 MEQ: 14.9 INJECTION, SOLUTION, CONCENTRATE PARENTERAL at 13:48

## 2017-01-01 RX ADMIN — MORPHINE SULFATE 10 MG: 100 SOLUTION ORAL at 18:45

## 2017-01-01 RX ADMIN — BRIMONIDINE TARTRATE 1 DROP: 2 SOLUTION/ DROPS OPHTHALMIC at 21:03

## 2017-01-01 RX ADMIN — TAZOBACTAM SODIUM AND PIPERACILLIN SODIUM 2.25 G: 250; 2 INJECTION, SOLUTION INTRAVENOUS at 12:05

## 2017-01-01 RX ADMIN — TETROFOSMIN 10.5 MCI.: 0.23 INJECTION, POWDER, LYOPHILIZED, FOR SOLUTION INTRAVENOUS at 09:54

## 2017-01-01 RX ADMIN — VANCOMYCIN HYDROCHLORIDE 1250 MG: 1 INJECTION, POWDER, LYOPHILIZED, FOR SOLUTION INTRAVENOUS at 00:46

## 2017-01-01 ASSESSMENT — PAIN SCALES - GENERAL
PAINLEVEL: NO PAIN (0)

## 2017-01-01 ASSESSMENT — ACTIVITIES OF DAILY LIVING (ADL)
RETIRED_COMMUNICATION: 0-->UNDERSTANDS/COMMUNICATES WITHOUT DIFFICULTY
TRANSFERRING: 0-->INDEPENDENT
SWALLOWING: 0-->SWALLOWS FOODS/LIQUIDS WITHOUT DIFFICULTY
SWALLOWING: 0-->SWALLOWS FOODS/LIQUIDS WITHOUT DIFFICULTY
BATHING: 2-->ASSISTIVE PERSON
FALL_HISTORY_WITHIN_LAST_SIX_MONTHS: YES
TOILETING: 0-->INDEPENDENT
COMMUNICATION: 0-->UNDERSTANDS/COMMUNICATES WITHOUT DIFFICULTY
TOILETING: 0-->INDEPENDENT
DRESS: 2-->ASSISTIVE PERSON
BATHING: 3-->ASSISTIVE EQUIPMENT AND PERSON
RETIRED_EATING: 0-->INDEPENDENT
TOILETING: 0-->INDEPENDENT
SWALLOWING: 0-->SWALLOWS FOODS/LIQUIDS WITHOUT DIFFICULTY
BATHING: 2-->ASSISTIVE PERSON
DRESS: 0-->INDEPENDENT
FALL_HISTORY_WITHIN_LAST_SIX_MONTHS: YES
TRANSFERRING: 0-->INDEPENDENT
NUMBER_OF_TIMES_PATIENT_HAS_FALLEN_WITHIN_LAST_SIX_MONTHS: 1
AMBULATION: 0-->INDEPENDENT
NUMBER_OF_TIMES_PATIENT_HAS_FALLEN_WITHIN_LAST_SIX_MONTHS: 2
COGNITION: 1 - ATTENTION OR MEMORY DEFICITS
AMBULATION: 0-->INDEPENDENT
EATING: 0-->INDEPENDENT
COGNITION: 2 - DIFFICULTY WITH ORGANIZING THOUGHTS
DRESS: 0-->INDEPENDENT
WHICH_OF_THE_ABOVE_FUNCTIONAL_RISKS_HAD_A_RECENT_ONSET_OR_CHANGE?: FALL HISTORY;COGNITION
RETIRED_EATING: 0-->INDEPENDENT
TRANSFERRING: 0-->INDEPENDENT
RETIRED_COMMUNICATION: 0-->UNDERSTANDS/COMMUNICATES WITHOUT DIFFICULTY
AMBULATION: 0-->INDEPENDENT

## 2017-01-01 ASSESSMENT — ENCOUNTER SYMPTOMS
SHORTNESS OF BREATH: 1
COUGH: 1
HEADACHES: 0
CONFUSION: 1
WEAKNESS: 1
APPETITE CHANGE: 1
FEVER: 1
ACTIVITY CHANGE: 1

## 2017-01-04 NOTE — TELEPHONE ENCOUNTER
Reason for Call:  Other  Ximena calls wanting to know what to do with Marilou and his medication and care while waiting for Stress Test Results    Detailed comments: Please call and advise    Phone Number Patient can be reached at: Home number on file [phone] (home)    Best Time: any    Can we leave a detailed message on this number? YES    Call taken on 1/4/2017 at 1:35 PM by Yoanna Gonzalez

## 2017-01-10 NOTE — PROGRESS NOTES
Quick Note:    Dear Bobby, your recent test results are attached.   Your stress test demonstrated several questionable areas of poor circulation in the heart. I would like to set up a cardiology evaluation at a time of your convenience.    Feel free to contact me via the office or My Chart if you have any questions regarding the above.    Sincerely,  Omero Ye, DO FACOI    ______

## 2017-01-11 NOTE — NURSING NOTE
"Chief Complaint   Patient presents with     Results     f/u       Initial /42 mmHg  Pulse 75  Temp(Src) 97.7  F (36.5  C) (Temporal)  Ht 5' 4\" (1.626 m)  Wt 156 lb 12.8 oz (71.124 kg)  BMI 26.90 kg/m2  SpO2 94% Estimated body mass index is 26.9 kg/(m^2) as calculated from the following:    Height as of this encounter: 5' 4\" (1.626 m).    Weight as of this encounter: 156 lb 12.8 oz (71.124 kg).  BP completed using cuff size: julia DONAHUE      "

## 2017-01-11 NOTE — PROGRESS NOTES
Chief Complaint   Patient presents with     Results     f/u     CHIEF COMPLAINT:    The patient is an 85-year-old gentleman who has progressive dementia. He was recently having some bradycardia with elevated cardiac enzymes. This was evaluated in the emergency department. He subsequently underwent chemical nuclear stress test and has been found to have several areas of what seems to be ischemia. I have discussed this with the patient's wife in detail. They do have an appointment with cardiology scheduled for Monday of next week. The patient however has progressive dementia. His wife notes that he has not changed his clothes in several weeks. This would seem to be obvious. She also notes that he wears his coat and gloves indoors even when it's not cold. This too would seem a bit obvious. He is not conversational. He does ambulate with her assistance and direction. She notes that he has been having episodes of some aggression and at times becomes a bit physical. He does not pursue her but will swat at her when she is trying to help him. No injuries have occurred.                         PAST, FAMILY,SOCIAL HISTORY:     Medical  History:   has a past medical history of NO ACTIVE PROBLEMS; Hypertension; and Caregiver burden (8/31/2016).     Surgical History:   has past surgical history that includes APPENDECTOMY and REMOVE TONSILS/ADENOIDS,<13 Y/O.     Social History:   reports that he quit smoking about 65 years ago. He has never used smokeless tobacco. He reports that he drinks alcohol. He reports that he does not use illicit drugs.     Family History:  family history includes Arthritis in his paternal grandmother; C.A.D. in his mother and sister; DIABETES in his sister; GASTROINTESTINAL DISEASE in his sister and sister; Prostate Cancer in his father.            MEDICATIONS  Current Outpatient Prescriptions   Medication Sig Dispense Refill     risperiDONE (RISPERDAL) 0.5 MG tablet Take 2 tablets (1 mg) by mouth At  Bedtime 60 tablet 0     isosorbide mononitrate (IMDUR) 30 MG 24 hr tablet Take 1 tablet (30 mg) by mouth daily 30 tablet 1     terazosin (HYTRIN) 5 MG capsule Take 1 capsule (5 mg) by mouth 2 times daily 90 capsule 1     lisinopril-hydrochlorothiazide (PRINZIDE,ZESTORETIC) 20-25 MG per tablet Take 1 tablet by mouth every morning 90 tablet 1     multivitamin, therapeutic with minerals (MULTI-VITAMIN) TABS Take 1 tablet by mouth daily       tamsulosin (FLOMAX) 0.4 MG 24 hr capsule Take 1 capsule (0.4 mg) by mouth daily 90 capsule 1     S-Adenosylmethionine (MARYLU-E COMPLETE PO)        Cholecalciferol (VITAMIN D) 2000 UNITS tablet Take 2,000 Units by mouth daily 100 tablet 3     BRIMONIDINE TARTRATE OP Apply 1 drop to eye 2 times daily. Left eye       DORZOLAMIDE HCL-TIMOLOL MAL OP Apply 1 drop to eye 2 times daily. Both eyes       ASPIRIN 81 MG OR TABS 1 tab po QD (Once per day) 100 3         --------------------------------------------------------------------------------------------------------------------                          REVIEW OF SYSTEMS:      As told by the patient's wife.   LUNGS: Pt denies: cough,excess sputum, hemoptysis, or shortness of breath.   HEART: Pt denies: chest pain, arrythmia, syncope, tachy or bradyarrhythmia or excess edema.   GI: Pt denies: nausea, vomitting, diarrhea, constipation, melena, or hematochezia.   NEURO: Pt denies: seizures, strokes, diplopia, weakness, paraesthesias, or paralysis.                          EXAMINATION:          Vital Signs:  B/P: 102/42, T: 97.7, P: 75, R: Data Unavailable, BMI: Body mass index is 26.9 kg/(m^2).   Constitutional: The patient appears to be in no acute distress. The patient appears to be adequately hydrated. No acute respiratory or hemodynamic distress is noted at this time. Hygiene is currently a bit lacking.   LUNGS: clear bilaterally, airflow is brisk, no intercostal retraction or stridor is noted. No coughing is noted during visit.   HEART:   regular without rubs, clicks, gallops, or murmurs. PMI is nondisplaced. Upstrokes are brisk. S1,S2 are heard.   SKIN:  warm and dry. No erythema, or rashes are noted. No specific lesions of concern are noted.   No lesions are noted at this time.   PSYCH: The patient appears stoic and confused. Does not maintain eye contact, does not have any jittery or atypical motion. Displays flat affect. Does not respond verbally                          DECISION MAKING:       #1 coronary artery disease   Patient and his wife, mostly his wife will discuss with cardiology the benefits of possible angiography. If they decide not to go, they will notify cardiology.   He will continue his current medications and we discussed the possibility of additional medical therapy. I will leave this under the direction of cardiology for now.  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 The patient's wife has displayed an understanding of the above, and all subsequent questions were answered.     45  minutes were spent with patient and greater than 50% of the time was spent counseling patient and coordinating,as well as educating the patient on their disease/health      DO JONE Mohan    Portions of this note were produced using [employer]  Although every attempt at real-time proof reading has been made, occasional grammar/syntax errors may have been missed.

## 2017-01-12 NOTE — TELEPHONE ENCOUNTER
Reason for Call:  Home Health Care    Silvina  with [facility] called regarding (reason for call): Has pitting edema and has increased from last week +4 pitting edema and + 3 on tops of both feet. Would like to know if you want to do something for this. Please advise.     Orders are needed for this patient.     PT:  JADE    OT:  JADE    Skilled Nursing:  JADE    Pt Provider:  Cassi     Phone Number Homecare Nurse can be reached at: [phone]      Can we leave a detailed message on this number? YES    Phone number patient can be reached at: NA  Best Time: any     Call taken on 1/12/2017 at 2:15 PM by Nancy Palafox

## 2017-01-16 NOTE — PROGRESS NOTES
"      Clinical Cardiology Consultation    Chief Complaint: abnormal stress nuclear images, sinus bradycardia    HPI: [facility]. He has increasing dementia. He will talk to her when she talks to him but is otherwise non-communicative. He had no complaints. His slow heart rate was [facility]. For some reason troponins were checked in the ED, though his wife reports he had not been complaining of chest pain or any pain/discomfort. Coronary artery disease would not be expected to cause sinus bradycardia. Nonetheless, the troponin was mildly elevated. This lead to a stress nuclear study which showed possible ischemia in more than one territory.     Patient answers questions but answers \"no\" or \"fine\". He's wife reports he does not complain to her. She has not noticed evidence of chest pain or chest discomfort or unusual shortness of breath or dyspnea on exertion. He is very inactive.     Current Outpatient Prescriptions   Medication Sig Dispense Refill     furosemide (LASIX) 20 MG tablet Take 1 tablet (20 mg) by mouth 2 times daily 60 tablet 0     risperiDONE (RISPERDAL) 0.5 MG tablet Take 2 tablets (1 mg) by mouth At Bedtime 60 tablet 0     isosorbide mononitrate (IMDUR) 30 MG 24 hr tablet Take 1 tablet (30 mg) by mouth daily 30 tablet 1     terazosin (HYTRIN) 5 MG capsule Take 1 capsule (5 mg) by mouth 2 times daily 90 capsule 1     lisinopril-hydrochlorothiazide (PRINZIDE,ZESTORETIC) 20-25 MG per tablet Take 1 tablet by mouth every morning 90 tablet 1     multivitamin, therapeutic with minerals (MULTI-VITAMIN) TABS Take 1 tablet by mouth daily       tamsulosin (FLOMAX) 0.4 MG 24 hr capsule Take 1 capsule (0.4 mg) by mouth daily 90 capsule 1     S-Adenosylmethionine (MARYLU-E COMPLETE PO)        Cholecalciferol (VITAMIN D) 2000 UNITS tablet Take 2,000 Units by mouth daily 100 tablet 3     BRIMONIDINE TARTRATE OP Apply 1 drop to eye 2 times daily. " "Left eye       ASPIRIN 81 MG OR TABS 1 tab po QD (Once per day) (Patient taking differently: 1 tab po QD (Once per day)   taking 4 daily) 100 3     DORZOLAMIDE HCL-TIMOLOL MAL OP Apply 1 drop to eye 2 times daily. Both eyes         Past Medical History   Diagnosis Date     NO ACTIVE PROBLEMS      Hypertension      Caregiver burden 2016     strongly advised placement into nursing care facility as his dementia worsens.        Past Surgical History   Procedure Laterality Date     C appendectomy       Hc remove tonsils/adenoids,<13 y/o         Family History   Problem Relation Age of Onset     C.A.D. Mother      had rheumatic fever as a child     C.A.D. Sister      had MI  had bi pass--another sister had heart operation.      DIABETES Sister       at age 81     Prostate Cancer Father       at age 59     Arthritis Paternal Grandmother      GASTROINTESTINAL DISEASE Sister      GASTROINTESTINAL DISEASE Sister      esophogus ulcer       Social History   Substance Use Topics     Smoking status: Former Smoker -- 0.00 packs/day for 6 years     Quit date: 1952     Smokeless tobacco: Never Used     Alcohol Use: 0.0 oz/week     0 Standard drinks or equivalent per week      Comment: rare       Allergies   Allergen Reactions     No Known Drug Allergies          ROS: not obtainable due to his dementia    Physical Examination:  Vitals: /50 mmHg  Pulse 66  Resp 13  Ht 1.651 m (5' 5\")  Wt 67.994 kg (149 lb 14.4 oz)  BMI 24.94 kg/m2  SpO2 97%  BMI= Body mass index is 24.94 kg/(m^2).    GENERAL APPEARANCE: frail, alert but uninterested, no distress  HEENT: no icterus, no xanthelasmas, normal pupil size  mucosa moist, no cyanosis.  NECK:  JVP is not visible  CHEST: lungs clear to auscultation - no rales, rhonchi or wheezes, no use of accessory muscles, no retractions, respirations are unlabored, normal respiratory rate, no kyphosis, no scoliosis  CARDIOVASCULAR: regular rhythm, normal S1S2, no S3 or S4 " and no murmur, click or rub, precordium quiet with normal PMI.  ABDOMEN: soft, non tender, bowel sounds normal  EXTREMITIES: no clubbing, cyanosis or edema  NEURO: alert but only speaks if asked questions, ambulates without assistance  VASC: warm      Laboratory:    Results for TI GEIGER (MRN [id number]) as of 1/16/2017 09:49   Ref. Range 12/24/2016 11:55   Sodium Latest Ref Range: 133-144 mmol/L 146 (H)   Potassium Latest Ref Range: 3.4-5.3 mmol/L 3.8   Chloride Latest Ref Range:  mmol/L 107   Carbon Dioxide Latest Ref Range: 20-32 mmol/L 32   Urea Nitrogen Latest Ref Range: 7-30 mg/dL 27   Creatinine Latest Ref Range: 0.66-1.25 mg/dL 1.16   GFR Estimate Latest Ref Range: >60 mL/min/1.7m2 60 (L)       GATED MYOCARDIAL PERFUSION SCINTIGRAPHY WITH INTRAVENOUS PHARMACOLOGIC  VASODILATATION LEXISCAN -ONE DAY STUDY      1/3/2017 12:31 PM  TI GEIGER  85 years  Male  1/31/1931.     Indication/Clinical History: Symptomatic bradycardia and positive  cardiac enzymes     Impression  1.  Myocardial perfusion imaging using single isotope technique  demonstrated several areas of ischemia: a  small area of apical and  anteroapical ischemia, a small area of inferoseptal ischemia at mid  ventricle, a small-moderate  area of lateral and inferolateral  ischemia at the base to mid ventricle.    2. Gated images demonstrated normal wall motion and thickening.  The  left ventricular systolic function is 64% at rest and 63% post stress.  3. Compared to the prior study from there is no prior study available  for comparison .    Assessment and recommendations:    1) Abnormal stress nuclear images - Nearly the entirety of our 40 minute visit was spent discussing this with Mr. Geiger's wife and daughter. We discussed that typically would recommend cath to evaluate for multivessel CAD and that if this is the case, often coronary artery bypass surgery is recommended. Given his dementia the question they need to decide, is based on  what they know of their /father is wether or not that is something he would agree to. It is possible stenting would be possible over coronary artery bypass surgery but there is a lack of compelling evidence that stents, as opposed to surgery, prolongs life or reduces the risk of MI.     After careful discussion they believe he would prefer to not undergo an invasive evaluation. Of course, should he develop symptoms of angina or heart failure that decision can be re-addressed.    2) Sinus bradycardia - this would not be likely to be due to coronary artery disease. He is relatively inactive. There is no history of of syncope.     - Ziopatch ordered to assess for pauses/bradycardia that could be of concern, even in the absence of symptoms.      I appreciate the chance to help with Mr. Hanson's care. Please let me know if you have any questions or concerns.    MD INGA Case George B

## 2017-01-16 NOTE — MR AVS SNAPSHOT
After Visit Summary   1/16/2017    Bobby Hanson    MRN: [id number]           Patient Information     Date Of Birth          1/31/1931        Visit Information        Provider Department      1/16/2017 10:00 AM Christiano Wiggins MD [facility]        Today's Diagnoses     Sinoatrial node dysfunction (H)    -  1        Follow-ups after your visit        Additional Services     Follow-Up with Electrophysiologist                 Future tests that were ordered for you today     Open Future Orders        Priority Expected Expires Ordered    Follow-Up with Electrophysiologist Routine 2/27/2017 1/16/2018 1/16/2017    Zio Patch Monitor Routine 1/23/2017 1/16/2018 1/16/2017            Who to contact     If you have questions or need follow up information about today's clinic visit or your schedule please contact [facility] directly at [phone].  Normal or non-critical lab and imaging results will be communicated to you by MyChart, letter or phone within 4 business days after the clinic has received the results. If you do not hear from us within 7 days, please contact the clinic through [employer] or phone. If you have a critical or abnormal lab result, we will notify you by phone as soon as possible.  Submit refill requests through [employer] or call your pharmacy and they will forward the refill request to us. Please allow 3 business days for your refill to be completed.          Additional Information About Your Visit        MyChart Information     [employer] gives you secure access to your electronic health record. If you see a primary care provider, you can also send messages to your care team and make appointments. If you have questions, please call your primary care clinic.  If you do not have a primary care provider, please call [phone] and they will assist you.        Care EveryWhere ID     This is your Care EveryWhere ID. This could be used by other organizations to  "access your [facility] medical records  [id number]        Your Vitals Were     Pulse Respirations Height BMI (Body Mass Index) Pulse Oximetry       66 13 1.651 m (5' 5\") 24.94 kg/m2 97%        Blood Pressure from Last 3 Encounters:   01/16/17 118/50   01/11/17 102/42   12/27/16 128/62    Weight from Last 3 Encounters:   01/16/17 67.994 kg (149 lb 14.4 oz)   01/11/17 71.124 kg (156 lb 12.8 oz)   12/27/16 68.947 kg (152 lb)                 Today's Medication Changes          These changes are accurate as of: 1/16/17 10:55 AM.  If you have any questions, ask your nurse or doctor.               These medicines have changed or have updated prescriptions.        Dose/Directions    aspirin 81 MG tablet   This may have changed:  See the new instructions.        1 tab po QD (Once per day)   Quantity:  100   Refills:  3                Primary Care Provider Office Phone # Fax #    Rao Lopez [phone]       [place] [address] [facility] [address]        Thank you!     Thank you for choosing [facility]  for your care. Our goal is always to provide you with excellent care. Hearing back from our patients is one way we can continue to improve our services. Please take a few minutes to complete the written survey that you may receive in the mail after your visit with us. Thank you!             Your Updated Medication List - Protect others around you: Learn how to safely use, store and throw away your medicines at www.disposemymeds.org.          This list is accurate as of: 1/16/17 10:55 AM.  Always use your most recent med list.                   Brand Name Dispense Instructions for use    aspirin 81 MG tablet     100    1 tab po QD (Once per day)       BRIMONIDINE TARTRATE OP      Apply 1 drop to eye 2 times daily. Left eye       DORZOLAMIDE HCL-TIMOLOL MAL OP      Apply 1 drop to eye 2 times daily. Both eyes       furosemide 20 MG tablet    LASIX    60 tablet    " Take 1 tablet (20 mg) by mouth 2 times daily       isosorbide mononitrate 30 MG 24 hr tablet    IMDUR    30 tablet    Take 1 tablet (30 mg) by mouth daily       lisinopril-hydrochlorothiazide 20-25 MG per tablet    PRINZIDE/ZESTORETIC    90 tablet    Take 1 tablet by mouth every morning       Multi-vitamin Tabs tablet      Take 1 tablet by mouth daily       risperiDONE 0.5 MG tablet    risperDAL    60 tablet    Take 2 tablets (1 mg) by mouth At Bedtime       MARYLU-E COMPLETE PO          tamsulosin 0.4 MG capsule    FLOMAX    90 capsule    Take 1 capsule (0.4 mg) by mouth daily       terazosin 5 MG capsule    HYTRIN    90 capsule    Take 1 capsule (5 mg) by mouth 2 times daily       vitamin D 2000 UNITS tablet     100 tablet    Take 2,000 Units by mouth daily

## 2017-01-16 NOTE — Clinical Note
"1/16/2017    Rao Lopez MD  [facility]   [address] [facility] [address]    RE: Bobby PATRICIA Hanson       Dear Colleague,    I had the pleasure of seeing Bobby Hanson in the [facility].    Chief Complaint: abnormal stress nuclear images, sinus bradycardia    HPI: [facility]ady[facility]. He has increasing dementia. He will talk to her when she talks to him but is otherwise non-communicative. He had no complaints. His slow heart rate was noted [facility]. For some reason troponins were checked in the ED, though his wife reports he had not been complaining of chest pain or any pain/discomfort. Coronary artery disease would not be expected to cause sinus bradycardia. Nonetheless, the troponin was mildly elevated. This lead to a stress nuclear study which showed possible ischemia in more than one territory.     Patient answers questions but answers \"no\" or \"fine\". He's wife reports he does not complain to her. She has not noticed evidence of chest pain or chest discomfort or unusual shortness of breath or dyspnea on exertion. He is very inactive.     Current Outpatient Prescriptions   Medication Sig Dispense Refill     furosemide (LASIX) 20 MG tablet Take 1 tablet (20 mg) by mouth 2 times daily 60 tablet 0     risperiDONE (RISPERDAL) 0.5 MG tablet Take 2 tablets (1 mg) by mouth At Bedtime 60 tablet 0     isosorbide mononitrate (IMDUR) 30 MG 24 hr tablet Take 1 tablet (30 mg) by mouth daily 30 tablet 1     terazosin (HYTRIN) 5 MG capsule Take 1 capsule (5 mg) by mouth 2 times daily 90 capsule 1     lisinopril-hydrochlorothiazide (PRINZIDE,ZESTORETIC) 20-25 MG per tablet Take 1 tablet by mouth every morning 90 tablet 1     multivitamin, therapeutic with minerals (MULTI-VITAMIN) TABS Take 1 tablet by mouth daily       tamsulosin (FLOMAX) 0.4 MG 24 hr capsule Take 1 capsule (0.4 mg) by mouth daily 90 capsule 1 " "    S-Adenosylmethionine (MARYLU-E COMPLETE PO)        Cholecalciferol (VITAMIN D) 2000 UNITS tablet Take 2,000 Units by mouth daily 100 tablet 3     BRIMONIDINE TARTRATE OP Apply 1 drop to eye 2 times daily. Left eye       ASPIRIN 81 MG OR TABS 1 tab po QD (Once per day) (Patient taking differently: 1 tab po QD (Once per day)   taking 4 daily) 100 3     DORZOLAMIDE HCL-TIMOLOL MAL OP Apply 1 drop to eye 2 times daily. Both eyes         Past Medical History   Diagnosis Date     NO ACTIVE PROBLEMS      Hypertension      Caregiver burden 2016     strongly advised placement into nursing care facility as his dementia worsens.        Past Surgical History   Procedure Laterality Date     C appendectomy       Hc remove tonsils/adenoids,<13 y/o         Family History   Problem Relation Age of Onset     C.A.D. Mother      had rheumatic fever as a child     C.A.D. Sister      had MI  had bi pass--another sister had heart operation.      DIABETES Sister       at age 81     Prostate Cancer Father       at age 59     Arthritis Paternal Grandmother      GASTROINTESTINAL DISEASE Sister      GASTROINTESTINAL DISEASE Sister      esophogus ulcer       Social History   Substance Use Topics     Smoking status: Former Smoker -- 0.00 packs/day for 6 years     Quit date: 1952     Smokeless tobacco: Never Used     Alcohol Use: 0.0 oz/week     0 Standard drinks or equivalent per week      Comment: rare       Allergies   Allergen Reactions     No Known Drug Allergies          ROS: not obtainable due to his dementia    Physical Examination:  Vitals: /50 mmHg  Pulse 66  Resp 13  Ht 1.651 m (5' 5\")  Wt 67.994 kg (149 lb 14.4 oz)  BMI 24.94 kg/m2  SpO2 97%  BMI= Body mass index is 24.94 kg/(m^2).    GENERAL APPEARANCE: frail, alert but uninterested, no distress  HEENT: no icterus, no xanthelasmas, normal pupil size  mucosa moist, no cyanosis.  NECK:  JVP is not visible  CHEST: lungs clear to auscultation - no " rales, rhonchi or wheezes, no use of accessory muscles, no retractions, respirations are unlabored, normal respiratory rate, no kyphosis, no scoliosis  CARDIOVASCULAR: regular rhythm, normal S1S2, no S3 or S4 and no murmur, click or rub, precordium quiet with normal PMI.  ABDOMEN: soft, non tender, bowel sounds normal  EXTREMITIES: no clubbing, cyanosis or edema  NEURO: alert but only speaks if asked questions, ambulates without assistance  VASC: warm      Laboratory:    Results for TI GEIGER (MRN [id number]) as of 1/16/2017 09:49   Ref. Range 12/24/2016 11:55   Sodium Latest Ref Range: 133-144 mmol/L 146 (H)   Potassium Latest Ref Range: 3.4-5.3 mmol/L 3.8   Chloride Latest Ref Range:  mmol/L 107   Carbon Dioxide Latest Ref Range: 20-32 mmol/L 32   Urea Nitrogen Latest Ref Range: 7-30 mg/dL 27   Creatinine Latest Ref Range: 0.66-1.25 mg/dL 1.16   GFR Estimate Latest Ref Range: >60 mL/min/1.7m2 60 (L)       GATED MYOCARDIAL PERFUSION SCINTIGRAPHY WITH INTRAVENOUS PHARMACOLOGIC  VASODILATATION LEXISCAN -ONE DAY STUDY      1/3/2017 12:31 PM  TI GEIGER  85 years  Male  1/31/1931.     Indication/Clinical History: Symptomatic bradycardia and positive  cardiac enzymes     Impression  1.  Myocardial perfusion imaging using single isotope technique  demonstrated several areas of ischemia: a  small area of apical and  anteroapical ischemia, a small area of inferoseptal ischemia at mid  ventricle, a small-moderate  area of lateral and inferolateral  ischemia at the base to mid ventricle.    2. Gated images demonstrated normal wall motion and thickening.  The  left ventricular systolic function is 64% at rest and 63% post stress.  3. Compared to the prior study from there is no prior study available  for comparison .    Assessment and recommendations:    1) Abnormal stress nuclear images - Nearly the entirety of our 40 minute visit was spent discussing this with Mr. Geiger's wife and daughter. We discussed that  typically would recommend cath to evaluate for multivessel CAD and that if this is the case, often coronary artery bypass surgery is recommended. Given his dementia the question they need to decide, is based on what they know of their /father is wether or not that is something he would agree to. It is possible stenting would be possible over coronary artery bypass surgery but there is a lack of compelling evidence that stents, as opposed to surgery, prolongs life or reduces the risk of MI.     After careful discussion they believe he would prefer to not undergo an invasive evaluation. Of course, should he develop symptoms of angina or heart failure that decision can be re-addressed.    2) Sinus bradycardia - this would not be likely to be due to coronary artery disease. He is relatively inactive. There is no history of of syncope.     - Ziopatch ordered to assess for pauses/bradycardia that could be of concern, even in the absence of symptoms.    I appreciate the chance to help with Mr. Hanson's care. Please let me know if you have any questions or concerns.    Sincerely,     Christiano Wiggins MD

## 2017-02-06 NOTE — TELEPHONE ENCOUNTER
Hytrin      Last Written Prescription Date: 11/8/16  Last Fill Quantity: 90, # refills: 1    Last Office Visit with G, [facility] or [facility] prescribing provider:  1/11/17   Future Office Visit:    Next 5 appointments (look out 90 days)     Feb 27, 2017  1:30 PM   Return Visit with Christiano Wiggins MD   [facility]    [address] [place] [id number] [phone]                    BP Readings from Last 3 Encounters:   01/16/17 118/50   01/11/17 102/42   12/27/16 128/62

## 2017-02-09 NOTE — TELEPHONE ENCOUNTER
Risperdal      Last Written Prescription Date: 1/11/2017  Last Fill Quantity: 60, # refills: 0  Last Office Visit with G, P or  Health prescribing provider: 1/11/2017   Next 5 appointments (look out 90 days)     Feb 27, 2017  1:30 PM   [facility] Visit with Christiano Wiggins MD   [facility]    [address] [place] [id number] [phone]                   BP Readings from Last 3 Encounters:   01/16/17 118/50   01/11/17 102/42   12/27/16 128/62     Pulse Readings from Last 2 Encounters:   01/16/17 66   01/11/17 75     GLC       96   12/24/2016  WBC      6.2   12/24/2016  RBC     4.27   12/24/2016  HGB     13.7   12/24/2016  HCT     41.4   12/24/2016  No components found with this name: mct  MCV       97   12/24/2016  MCH     32.1   12/24/2016  MCHC     33.1   12/24/2016  RDW     13.6   12/24/2016  PLT      189   12/24/2016  CHOL      134   10/3/2012  HDL       47   10/3/2012  LDL       80   10/3/2012  TRIG       39   10/3/2012  CHOLHDLRATIO      3.0   10/3/2012

## 2017-02-10 PROBLEM — R00.1 BRADYCARDIA: Status: ACTIVE | Noted: 2017-01-01

## 2017-02-10 PROBLEM — I95.9 HYPOTENSION, UNSPECIFIED HYPOTENSION TYPE: Status: ACTIVE | Noted: 2017-01-01

## 2017-02-10 PROBLEM — N17.9 ACUTE KIDNEY INJURY (H): Status: ACTIVE | Noted: 2017-01-01

## 2017-02-10 PROBLEM — R55 SYNCOPE: Status: ACTIVE | Noted: 2017-01-01

## 2017-02-10 PROBLEM — N40.1 BENIGN PROSTATIC HYPERPLASIA WITH LOWER URINARY TRACT SYMPTOMS: Status: ACTIVE | Noted: 2017-01-01

## 2017-02-10 PROBLEM — R31.29 MICROSCOPIC HEMATURIA: Status: ACTIVE | Noted: 2017-01-01

## 2017-02-10 PROBLEM — R79.89 ELEVATED TROPONIN: Status: ACTIVE | Noted: 2017-01-01

## 2017-02-10 NOTE — TELEPHONE ENCOUNTER
Routing refill request to provider for review/approval because:  Labs not current:  FLP - This lab cannot be ordered with this diagnosis.  Routing to PCP for further advice.    Janet Roberts RN

## 2017-02-10 NOTE — IP AVS SNAPSHOT
[address] [facility]    [address] [place] [address]    Phone:  [phone]                                       After Visit Summary   2/10/2017    Bobby Hanson    MRN: [id number]           After Visit Summary Signature Page     I have received my discharge instructions, and my questions have been answered. I have discussed any challenges I see with this plan with the nurse or doctor.    ..........................................................................................................................................  Patient/Patient Representative Signature      ..........................................................................................................................................  Patient Representative Print Name and Relationship to Patient    ..................................................               ................................................  Date                                            Time    ..........................................................................................................................................  Reviewed by Signature/Title    ...................................................              ..............................................  Date                                                            Time

## 2017-02-10 NOTE — ED PROVIDER NOTES
"  History     Chief Complaint   Patient presents with     Altered Mental Status     The history is provided by the patient and the EMS personnel.     Bobby Hanson is a 86 year old male who presents to the emergency department via EMS with an altered mental status. During lunch at [facility], he \"did a face plant into his lunch\" and was hypertensive. EMS report that en route the patient was unresponsive without facial expressions and would not talk or obey commands. Upon arrival to the ED, the patient was responding, talking and obeying commands. Patient denies pain anywhere on his body including a headache. Patient is unsure where he is and says, \"I am at the place where you do what you do\". His last known normal was at 1030 this morning.  Fingerstick blood glucose within normal limits. No other history available on presentation to the ED.    I have reviewed the Medications, Allergies, Past Medical and Surgical History, and Social History in the Epic system.    Patient Active Problem List   Diagnosis     CARDIOVASCULAR SCREENING; LDL GOAL LESS THAN 130     Advanced directives, counseling/discussion     Health Care Home     Dementia with behavioral disturbance, unspecified dementia type     Benign non-nodular prostatic hyperplasia with lower urinary tract symptoms     Urgency incontinence     Caregiver burden     Benign essential hypertension     Syncope     Hypotension, unspecified hypotension type     Benign prostatic hyperplasia with lower urinary tract symptoms     Bradycardia     Elevated troponin     Acute kidney injury (H)     Microscopic hematuria     Past Medical History   Diagnosis Date     Hypertension      Caregiver burden 8/31/2016     strongly advised placement into nursing care facility as his dementia worsens.        Past Surgical History   Procedure Laterality Date     C appendectomy       Hc remove tonsils/adenoids,<11 y/o         Family History   Problem Relation Age of Onset     C.A.D. Mother      " "had rheumatic fever as a child     C.A.D. Sister      had MI  had bi pass--another sister had heart operation.      DIABETES Sister       at age 81     Prostate Cancer Father       at age 59     Arthritis Paternal Grandmother      GASTROINTESTINAL DISEASE Sister      GASTROINTESTINAL DISEASE Sister      esophogus ulcer       Social History   Substance Use Topics     Smoking status: Former Smoker -- 0.00 packs/day for 6 years     Quit date: 1952     Smokeless tobacco: Never Used     Alcohol Use: 0.0 oz/week     0 Standard drinks or equivalent per week      Comment: rare        Immunization History   Administered Date(s) Administered     Influenza (IIV3) 10/28/1997          Allergies   Allergen Reactions     No Known Drug Allergies        No current outpatient prescriptions on file.     Review of Systems   Reason unable to perform ROS: Full recent ROS unavailable as patient has dementia.   Neurological: Negative for headaches.   Psychiatric/Behavioral: Positive for confusion.       Physical Exam   /70 mmHg  Pulse 62  Temp(Src) 98.6  F (37  C) (Oral)  Resp 16  Ht 1.626 m (5' 4\")  Wt 63.5 kg (139 lb 15.9 oz)  BMI 24.02 kg/m2  SpO2 96%    Physical Exam   Constitutional: He appears well-developed and well-nourished.   Smiling, healthy appearing elderly male sitting on EMS cart   HENT:   Head: Normocephalic and atraumatic.   Nose: Nose normal.   Mouth/Throat: Oropharynx is clear and moist.   Eyes: Conjunctivae and EOM are normal. Pupils are equal, round, and reactive to light.   Neck: Normal range of motion. Neck supple.   Cardiovascular: Normal rate, regular rhythm, normal heart sounds and intact distal pulses.    Pulmonary/Chest: Effort normal and breath sounds normal.   Abdominal: Soft. There is no tenderness.   Musculoskeletal:   Moving all extremities. No gross deformities.   Neurological: He is alert. No cranial nerve deficit or sensory deficit. GCS eye subscore is 4. GCS verbal " "subscore is 5. GCS motor subscore is 6.   Patient squeezes both hands equally. He can lift both legs. Follows commands. Not oriented to date, place   Skin: Skin is warm and dry. He is not diaphoretic.   Psychiatric: He has a normal mood and affect.   Nursing note and vitals reviewed.  [employer] Stroke Scale  Exam Interval: Baseline   Score    Level of consciousness: (0)   Alert, keenly responsive    LOC questions: (1)   Answers one question correctly    LOC commands: (0)   Performs both tasks correctly    Best gaze: (0)   Normal    Visual: (0)   No visual loss    Facial palsy: (0)   Normal symmetrical movements    Motor arm (left): (0)   No drift    Motor arm (right): (0)   No drift    Motor leg (left): (0)   No drift    Motor leg (right): (0)   No drift    Limb ataxia: (0)   Absent    Sensory: (0)   Normal- no sensory loss    Best language: (0)   Normal- no aphasia    Dysarthria: (0)   Normal    Extinction and inattention: (0)   No abnormality        Total Score:  1         ED Course (with Medical Decision Making)    Pt seen and examined by me.  RN and EPIC notes reviewed.      Patient with acute altered mental status. Question of syncope versus stroke versus seizure versus other. He was seen immediately and sent to the CT scanner.    1241: Received the CT results - no acute findings. CT angiogram ordered.    Patient return to the ED. IV had been placed, labs sent. EKG, he continues to be pleasantly confused but no obvious focal neurologic deficits. CT angiogram without evidence of acute findings as noted below.    Patient's family arrived. His wife stated that he was is in his usual state of health this morning. She brought him to a senior care center while she went to get her hair done. When she came back, patient was inattentive, and not responding to questions. She was able to get him out her car \"he shuffle walked\". She thought perhaps he just needed to eat, so they went to a senior dining " facility. While they were waiting for the food, he suddenly fell forward into the tray.  Staff helped him sit up but he continued to be basically unresponsive and trying to fall forward. EMS was called. Patient's wife notes that he has been sneezing a lot lately but no fevers or chills or significant cough. He has not complained of any pain. He is always incontinent of urine. She has not noted any stool incontinence, blackness or blood in the stools. In fact, she is not sure if he has had a bowel movement in a number of days. She does think that he has had some decreased fluid intake.    Patient's labs show elevated BUN/creatinine. He was given IV fluids. He was also given IV fluids because of the contrast. His hemoglobin is slightly low. I did do a guaiac that was negative. Patient was catheterized for urine and there is blood noted. Because of family concern for abdominal distention and the blood in the urine I did elect to do a CT scan of the abdomen. Results as below show no acute findings.    I did review patient's chart. He has had some bradycardia in the ED. This was assessed previously. He also has a small troponin leak. He has seen cardiology and recently had a zio patch on. There is no results seen.  He has an appointment with cardiology early next month.  It is possible that he had a syncopal episode, cardiac dysrhythmia, TIA. At this point, I'm going to admit him for further monitoring and fluids as he appears dry. He is stable. I have spoken with the patient and his wife about the plan.     Procedures             EKG Interpretation:      Interpreted by Ina Kramer  Time reviewed: 1318  Symptoms at time of EKG: None   Rhythm: normal sinus   Rate: Normal  Axis: Normal  Ectopy: none  Conduction: normal  ST Segments/ T Waves: No ST-T wave changes  Q Waves: none  Comparison to prior: Unchanged    Clinical Impression: normal EKG    Results for orders placed or performed during the hospital encounter of  02/10/17 (from the past 24 hour(s))   Head CT w/o contrast    Narrative    CT HEAD W/O CONTRAST 2/10/2017 12:37 PM     HISTORY:  \. Code stroke    TECHNIQUE:  Axial images of the head without IV contrast material.  Radiation dose for this scan was reduced using automated exposure  control, adjustment of the mA and/or kV according to patient size, or  iterative reconstruction technique.    COMPARISON: 4/14/2016    FINDINGS: No intracranial hemorrhage, mass lesion, or acute infarct is  seen. No skull fracture. Atrophy. There is bilateral white matter  low-density, likely related to small vessel ischemic disease. Minimal  right maxillary sinus fluid.       Impression    IMPRESSION:  No acute intracranial pathology. Minimal right maxillary  sinusitis.    AMALIA ROMERO MD   CT Head Neck Angio w/o & w Contrast    Narrative    CT ANGIOGRAM OF THE HEAD AND NECK WITHOUT AND WITH CONTRAST  2/10/2017  12:58 PM     COMPARISON: None.    HISTORY: Evaluate for dissection/thromboembolism confusion.    TECHNIQUE:  Precontrast localizing scans were followed by CT  angiography with an injection of 80 mL Isovue-370 nonionic intravenous  contrast material with scans through the head and neck.  Images were  transferred to a separate 3-D workstation where multiplanar  reformations and 3-D images were created.  Estimates of carotid  stenoses are made relative to the distal internal carotid artery  diameters except as noted.      FINDINGS:   Neck CTA: The common carotid arteries bilaterally are patent without  stenosis. There is calcified and noncalcified atherosclerotic plaque  at the origins of the internal carotid arteries on both sides that  does not result in any significant narrowing on either side. The  cervical internal carotid arteries bilaterally are patent without  stenosis. There is mild-moderate atherosclerotic narrowing at the  origin of the right vertebral artery. The right vertebral artery is  otherwise widely patent  throughout the neck and skull base. The left  vertebral artery is tiny with no flow from the origin to the C5-C6  level and with intermittent flow throughout the neck suggesting  chronic origin stenosis/occlusion with probable collateral filling by  either muscular branches in the neck or retrograde filling by the  right vertebral artery.    Head CTA: There is calcified atherosclerotic plaque of the  intracranial distal internal carotid arteries on both sides that does  not result in stenosis on either side. The basilar, bilateral anterior  cerebral, bilateral middle cerebral and bilateral posterior cerebral  arteries are patent and unremarkable. The anterior communicating and  left posterior communicating arteries are patent and unremarkable.      Impression    IMPRESSION:  1. Calcified atherosclerotic plaque of the proximal and distal  internal carotid arteries on both sides that does not result in any  stenosis.  2. Probable chronic origin stenosis/occlusion of the left vertebral  artery with either collateral filling by muscular branches in the neck  versus retrograde flow from the right vertebral artery.  3. Mild-moderate atherosclerotic narrowing at the origin of the  dominant right vertebral artery.  4. Otherwise, normal neck and head CTA.    Radiation dose for this scan was reduced using automated exposure  control, adjustment of the mA and/or kV according to patient size, or  iterative reconstruction technique.    KEIRY SOTO MD   CBC with platelets differential   Result Value Ref Range    WBC 7.2 4.0 - 11.0 10e9/L    RBC Count 3.81 (L) 4.4 - 5.9 10e12/L    Hemoglobin 12.1 (L) 13.3 - 17.7 g/dL    Hematocrit 36.4 (L) 40.0 - 53.0 %    MCV 96 78 - 100 fl    MCH 31.8 26.5 - 33.0 pg    MCHC 33.2 31.5 - 36.5 g/dL    RDW 13.1 10.0 - 15.0 %    Platelet Count 170 150 - 450 10e9/L    Diff Method Automated Method     % Neutrophils 76.2 %    % Lymphocytes 6.2 %    % Monocytes 16.1 %    % Eosinophils 1.0 %    %  Basophils 0.4 %    % Immature Granulocytes 0.1 %    Absolute Neutrophil 5.5 1.6 - 8.3 10e9/L    Absolute Lymphocytes 0.5 (L) 0.8 - 5.3 10e9/L    Absolute Monocytes 1.2 0.0 - 1.3 10e9/L    Absolute Eosinophils 0.1 0.0 - 0.7 10e9/L    Absolute Basophils 0.0 0.0 - 0.2 10e9/L    Abs Immature Granulocytes 0.0 0 - 0.4 10e9/L   Comprehensive metabolic panel   Result Value Ref Range    Sodium 144 133 - 144 mmol/L    Potassium 4.0 3.4 - 5.3 mmol/L    Chloride 105 94 - 109 mmol/L    Carbon Dioxide 30 20 - 32 mmol/L    Anion Gap 9 3 - 14 mmol/L    Glucose 123 (H) 70 - 99 mg/dL    Urea Nitrogen 50 (H) 7 - 30 mg/dL    Creatinine 1.79 (H) 0.66 - 1.25 mg/dL    GFR Estimate 36 (L) >60 mL/min/1.7m2    GFR Estimate If Black 44 (L) >60 mL/min/1.7m2    Calcium 8.6 8.5 - 10.1 mg/dL    Bilirubin Total 0.4 0.2 - 1.3 mg/dL    Albumin 3.3 (L) 3.4 - 5.0 g/dL    Protein Total 6.1 (L) 6.8 - 8.8 g/dL    Alkaline Phosphatase 66 40 - 150 U/L    ALT 23 0 - 70 U/L    AST 19 0 - 45 U/L   Troponin I   Result Value Ref Range    Troponin I ES 0.064 (H) 0.000 - 0.045 ug/L   Chest XR,  PA & LAT    Narrative    CHEST TWO VIEWS  2/10/2017 3:42 PM     HISTORY: Altered mental status. Cough.    COMPARISON: None.      Impression    IMPRESSION: Aortic calcification. Chest otherwise negative. Lungs  clear. No significant interval change.    SVETA PARK MD   *UA reflex to Microscopic   Result Value Ref Range    Color Urine Yellow     Appearance Urine Clear     Glucose Urine Negative NEG mg/dL    Bilirubin Urine Negative NEG    Ketones Urine Negative NEG mg/dL    Specific Gravity Urine 1.015 1.003 - 1.035    Blood Urine Moderate (A) NEG    pH Urine 6.5 5.0 - 7.0 pH    Protein Albumin Urine Trace (A) NEG mg/dL    Urobilinogen Urine 0.2 0.2 - 1.0 EU/dL    Nitrite Urine Negative NEG    Leukocyte Esterase Urine Negative NEG    Source Catheterized Urine    Urine Microscopic   Result Value Ref Range    WBC Urine O - 2 0 - 2 /HPF    RBC Urine 25-50 (A) 0 - 2 /HPF     Hyaline Casts 5-10 (A) 0 - 2 /LPF   Occult blood stool   Result Value Ref Range    Occult Blood Negative NEG   Abd/pelvis CT - no contrast - Stone Protocol    Narrative    CT ABDOMEN AND PELVIS WITHOUT CONTRAST 2/10/2017 6:20 PM    HISTORY: Blood in urine. Syncope. Prior appendectomy.    TECHNIQUE: Helical axial scans from the dome of liver through the  pubic symphysis without contrast. Radiation dose for this scan was  reduced using automated exposure control, adjustment of the mA and/or  kV according to patient size, or iterative reconstruction technique.    COMPARISON: None.    FINDINGS: Vascular calcifications, including coronary artery  calcifications. The liver, spleen, and pancreas appear normal without  contrast. Adrenal glands may show slight symmetric thickening  bilaterally with no focal mass. Contrast material seen in renal  collecting systems bilaterally from CT angiogram of the head and neck  done today. There is a 1.2 cm benign cyst in the upper pole of the  right kidney. The kidneys are otherwise unremarkable. However, since  contrast material is present, it is difficult to exclude collecting  system calculi. No evidence for urinary tract obstruction bilaterally.  There is a small hiatal hernia. The bowel mesentery in the upper  abdomen are unremarkable.    Scans through the pelvis show distention of the urinary bladder with a  thickened trabeculated wall and multiple bladder diverticula/cellules.  There is marked enlargement of the prostate gland. There is a very  small left inguinal hernia containing only fat. No free fluid.      Impression    IMPRESSION:  1. Vascular calcifications, including coronary artery calcifications.  2. Mild symmetric thickening of the adrenal glands bilaterally of  uncertain etiology.  3. Distended urinary bladder with thickened trabeculated wall and  multiple bladder diverticula consistent with chronic bladder outlet  obstruction.  4. Marked enlargement of the prostate  gland.  5. Minimal left inguinal hernia containing only fat.    ADILIA DEL CASTILLO MD     Medications   risperiDONE (risperDAL) tablet 1 mg (not administered)   naloxone (NARCAN) injection 0.1-0.4 mg (not administered)   0.9% sodium chloride infusion (not administered)   acetaminophen (TYLENOL) tablet 650 mg (not administered)   ondansetron (ZOFRAN-ODT) ODT tab 4 mg (not administered)     Or   ondansetron (ZOFRAN) injection 4 mg (not administered)   aspirin EC EC tablet 325 mg (not administered)   0.9% sodium chloride BOLUS (0 mLs Intravenous Stopped 2/10/17 1715)   sodium chloride (PF) 0.9% PF flush 3 mL (3 mLs Intracatheter Given 2/10/17 1249)   sodium chloride 0.9 % for CT scan flush dose 500 mL (89 mLs Intravenous Given 2/10/17 1250)   iopamidol (ISOVUE-370) solution 100 mL (80 mLs Intravenous Given 2/10/17 1250)   0.9% sodium chloride BOLUS (0 mLs Intravenous Stopped 2/10/17 1615)     Assessments & Plan     I have reviewed the findings, diagnosis, plan with the patient and wife.    Current Discharge Medication List          Final diagnoses:   Transient alteration of awareness   Bradycardia   Dehydration     Disposition: Patient admitted in stable condition.    This document serves as a record of services personally performed by Ina Kramer MD. It was created on their behalf by Carlyn Parikh, a trained medical scribe. The creation of this record is based on the provider's personal observations and the statements of the patient. This document has been checked and approved by the attending provider.     Note: Chart documentation done in part with Dragon Voice Recognition software. Although reviewed after completion, some word and grammatical errors may remain.    2/10/2017   [facility] EMERGENCY DEPARTMENT      Ina Kramer MD  02/11/17 0030

## 2017-02-10 NOTE — ED NOTES
"EMS called to [facility] because patient reportedly \"did a face plant into his lunch.\" Patient had decreased LOC upon EMS arrival, hypertensive. Teresa Sandy RN  "

## 2017-02-10 NOTE — IP AVS SNAPSHOT
MRN:[id number]                      After Visit Summary   2/10/2017    Bobby Hanson    MRN: [id number]           Thank you!     Thank you for choosing [facility] for your care. Our goal is always to provide you with excellent care. Hearing back from our patients is one way we can continue to improve our services. Please take a few minutes to complete the written survey that you may receive in the mail after you visit with us. Thank you!        Patient Information     Date Of Birth          1/31/1931        About your hospital stay     You were admitted on:  February 10, 2017 You last received care in the:  [address] [facility]    You were discharged on:  February 11, 2017        Reason for your hospital stay       Hospitalized after 2 fainting spells with low blood pressure and improved                  Who to Call     For medical emergencies, please call 911.  For non-urgent questions about your medical care, please call your primary care provider or clinic, [phone]          Attending Provider     Provider    Ina Kramer MD Katter, James T, MD       Primary Care Provider Office Phone # Fax #    Rao Lopez [phone]       [facility] [address] [facility] [address]        After Care Instructions     Activity       Your activity upon discharge: activity as tolerated            Diet       Follow this diet upon discharge: Orders Placed This Encounter  Regular Diet Adult                  Follow-up Appointments     Follow-up and recommended labs and tests        Follow up with primary care provider, Rao Lopez MD, within 7 days to evaluate medication change and for hospital follow- up.                  Your next 10 appointments already scheduled     Feb 15, 2017  3:30 PM   [facility]    [address] [place] [address]  [phone]           Bring a current list of meds and any records pertaining to this visit.  For Physicals, please bring immunization records and any forms needing to be filled out.  Please arrive 10 minutes early to complete paperwork.            Feb 27, 2017  1:30 PM   [facility] Visit with Christiano Wiggins MD   [facility]    [address] [place] [id number] [phone]              Pending Results     Date and Time Order Name Status Description    2/10/2017 2230 MR Brain w/o Contrast In process             Statement of Approval     Ordered          02/11/17 1303  I have reviewed and agree with all the recommendations and orders detailed in this document.   EFFECTIVE NOW     Approved and electronically signed by:  Kurtis Stoner MD             Admission Information        Provider Department Dept Phone    2/10/2017 Kurtis Stoner MD [facility] [phone]      Your Vitals Were     Blood Pressure Pulse Temperature    149/76 mmHg 61 97.1  F (36.2  C) (Oral)    Respirations Height Weight    18 1.626 m (5' 4\") 63.5 kg (139 lb 15.9 oz)    BMI (Body Mass Index) Pulse Oximetry       24.02 kg/m2 96%       MyChart Information     [employer] gives you secure access to your electronic health record. If you see a primary care provider, you can also send messages to your care team and make appointments. If you have questions, please call your primary care clinic.  If you do not have a primary care provider, please call [phone] and they will assist you.        Care EveryWhere ID     This is your Care EveryWhere ID. This could be used by other organizations to access your [facility] medical records  [id number]           Review of your medicines      CONTINUE these medicines which may have CHANGED, or have new prescriptions. If we are uncertain of the size of tablets/capsules you have at home, strength may be listed as something that " might have changed.        Dose / Directions    aspirin 81 MG tablet   This may have changed:  See the new instructions.        taking 4 tabs daily   Quantity:  100 tablet   Refills:  3         CONTINUE these medicines which have NOT CHANGED        Dose / Directions    BRIMONIDINE TARTRATE OP        Dose:  1 drop   Apply 1 drop to eye 2 times daily. Left eye   Refills:  0       DORZOLAMIDE HCL-TIMOLOL MAL OP        Dose:  1 drop   Apply 1 drop to eye 2 times daily. Both eyes   Refills:  0       isosorbide mononitrate 30 MG 24 hr tablet   Commonly known as:  IMDUR   Used for:  Coronary artery disease involving native coronary artery of native heart with unstable angina pectoris (H)        Dose:  30 mg   Take 1 tablet (30 mg) by mouth daily   Quantity:  30 tablet   Refills:  1       Multi-vitamin Tabs tablet        Dose:  1 tablet   Take 1 tablet by mouth daily   Refills:  0       risperiDONE 0.5 MG tablet   Commonly known as:  risperDAL   Used for:  Dementia with behavioral disturbance, unspecified dementia type        Dose:  1 mg   Take 2 tablets (1 mg) by mouth At Bedtime   Quantity:  60 tablet   Refills:  0       MARYLU-E COMPLETE PO        Refills:  0       tamsulosin 0.4 MG capsule   Commonly known as:  FLOMAX   Used for:  Dysuria, Dementia with behavioral disturbance, unspecified dementia type, Benign non-nodular prostatic hyperplasia with lower urinary tract symptoms        Dose:  0.4 mg   Take 1 capsule (0.4 mg) by mouth daily   Quantity:  90 capsule   Refills:  1       terazosin 5 MG capsule   Commonly known as:  HYTRIN   Used for:  Benign essential hypertension, Urgency incontinence        TAKE ONE CAPSULE BY MOUTH TWICE A DAY   Quantity:  180 capsule   Refills:  1       vitamin D 2000 UNITS tablet        Dose:  2000 Units   Take 2,000 Units by mouth daily   Quantity:  100 tablet   Refills:  3         STOP taking     furosemide 20 MG tablet   Commonly known as:  LASIX           lisinopril-hydrochlorothiazide  20-25 MG per tablet   Commonly known as:  PRINZIDE/ZESTORETIC                    Protect others around you: Learn how to safely use, store and throw away your medicines at www.disposemymeds.org.             Medication List: This is a list of all your medications and when to take them. Check marks below indicate your daily home schedule. Keep this list as a reference.      Medications           Morning Afternoon Evening Bedtime As Needed    aspirin 81 MG tablet   taking 4 tabs daily                                            BRIMONIDINE TARTRATE OP   Apply 1 drop to eye 2 times daily. Left eye                                DORZOLAMIDE HCL-TIMOLOL MAL OP   Apply 1 drop to eye 2 times daily. Both eyes                                isosorbide mononitrate 30 MG 24 hr tablet   Commonly known as:  IMDUR   Take 1 tablet (30 mg) by mouth daily                                   Multi-vitamin Tabs tablet   Take 1 tablet by mouth daily                                   risperiDONE 0.5 MG tablet   Commonly known as:  risperDAL   Take 2 tablets (1 mg) by mouth At Bedtime   Last time this was given:  1 mg on 2/11/2017 12:00 AM                                   MARYLU-ELIER COMPLETE PO                                tamsulosin 0.4 MG capsule   Commonly known as:  FLOMAX   Take 1 capsule (0.4 mg) by mouth daily                                   terazosin 5 MG capsule   Commonly known as:  HYTRIN   TAKE ONE CAPSULE BY MOUTH TWICE A DAY                                      vitamin D 2000 UNITS tablet   Take 2,000 Units by mouth daily

## 2017-02-11 PROBLEM — I65.02 OCCLUSION OF LEFT VERTEBRAL ARTERY: Status: ACTIVE | Noted: 2017-01-01

## 2017-02-11 PROBLEM — I67.2 ATHEROSCLEROTIC CEREBROVASCULAR DISEASE: Status: ACTIVE | Noted: 2017-01-01

## 2017-02-11 NOTE — PLAN OF CARE
Problem: Goal Outcome Summary  Goal: Goal Outcome Summary  Outcome: Improving  VSS on RA. Afebrile.  Alert and oriented only to self.  LS clear. BS + x4. Tele- Sinus sunny.  Denies pain.  Ambulated out in halls x4 tonight with SBA.  Incontinent of urine and stool, wearing pull up- staff assisted with change.  Skin intact.  Family did report a history of dysphagia- family was encouraged to thicken fluid to nectar thickness, but Pt and family have chosen to not follow this and have had no difficulty reported.  Bedside swallow study was passed with ice water no straw, swallowed meds without difficulty in high gaona postion.  Triggering bed alarm multiple times, gets aggressive initally with alarm sounding and confusion.  Settles with reorientation, gets upset with inability to hold bladder-while unhooking IV etc.  Stated he doesn't like having women in bathroom with him, but has tolerated now.

## 2017-02-11 NOTE — H&P
[facility]    History and Physical  Hospitalist       Date of Admission:  2/10/2017  Date of Service (when I saw the patient): 02/10/2017    Assessment and Plan      Active Problems:    Syncope    Assessment: two unresponsive episodes witnessed today.  He has associated hypotension and a rise in his BUN and creatinine with a history of poor oral intake and taking diuretics all of which would suggest dehydration as a possible explanation.  He has been getting evaluated for bradycardia by cardiology and an arrhythmia needs to be considered.  No focal findings on exam but a small stroke is possible.  Seizure is possible but less likely.  No CP or ischemic EKG changes    Plan: register to observation, IV fluids, monitor BP, follow renal function, monitor neuro checks, MRI of the brain tomorrow, repeat basic profile tomorrow morning, monitor on tele      Hypotension, unspecified hypotension type    Assessment: as above.      Plan: as above      Acute kidney injury (H)    Assessment: likely due to volume depletion.  He does have some associated hematuria but suspect the rise in his creatinine is due to volume depletion    Plan: can repeat UA as an outpatient and consider urology if hematuria persists      Dementia with behavioral disturbance, unspecified dementia type    Assessment: chronic and fairly advanced.  Some problems previously with behaviors at night    Plan: risperdal      Benign essential hypertension    Assessment: BP running low prior to fluids    Plan: hold BP meds      Bradycardia    Assessment: only mild currently and not severe enough to cause syncope    Plan: monitor on tele      Elevated troponin    Assessment: asymptomatic without EKG changes.  Recently evaluated by cardiology due to abnormal stress test and decided against further evaluation    Plan: no need to follow      Microscopic hematuria    Assessment: as above    Plan: as above      Benign prostatic hyperplasia  with lower urinary tract symptoms    Assessment: chronic with urinary incontinence    Plan: hold meds for now    DVT Prophylaxis: anticipate less than 24 hour stay  Code Status: DNR / DNI - discussed with his wife and daughter.  Patient not able to effectively participate in the decision due to dementia.    Disposition: Expected discharge in 1 days once no recurrent syncope, stable vital signs and cardiac rhythm, MRI completed, renal function improving.    Spencer Parikh MD    Primary Care Physician  Rao Lopez MD    Chief Complaint  Passed out    History is obtained from the patient, emergency department physician and patient's spouse    History of Present Illness  Bobby Hanson is a 86 year old male who presents with syncope.  He apparently had a syncope at day care today.  His wife picked him up and noticed he seemed to be staring into space as she walked him to the car.  She took him out to eat and he passed out falling forward into his plate.  EMS was called and he was brought to the ED.  He was hypotensive.  He denies any symptoms but also denies any episodes today.  Now registered to observation with plans as outlined above.    Past Medical History   I have reviewed this patient's medical history and updated it with pertinent information if needed.   Past Medical History   Diagnosis Date     Hypertension      Caregiver burden 2016     strongly advised placement into nursing care facility as his dementia worsens.        Past Surgical History  I have reviewed this patient's surgical history and updated it with pertinent information if needed.  Past Surgical History   Procedure Laterality Date     C appendectomy       Hc remove tonsils/adenoids,<11 y/o         Prior to Admission Medications  Prior to Admission Medications   Prescriptions Last Dose Informant Patient Reported? Taking?   ASPIRIN 81 MG OR TABS   No No   Si tab po QD (Once per day)   Patient taking differently: 1 tab po QD  (Once per day)   taking 4 daily   BRIMONIDINE TARTRATE OP   Yes No   Sig: Apply 1 drop to eye 2 times daily. Left eye   Cholecalciferol (VITAMIN D) 2000 UNITS tablet   Yes No   Sig: Take 2,000 Units by mouth daily   DORZOLAMIDE HCL-TIMOLOL MAL OP   Yes No   Sig: Apply 1 drop to eye 2 times daily. Both eyes   S-Adenosylmethionine (MARYLU-E COMPLETE PO)   Yes No   furosemide (LASIX) 20 MG tablet   No No   Sig: Take 1 tablet (20 mg) by mouth 2 times daily   isosorbide mononitrate (IMDUR) 30 MG 24 hr tablet   No No   Sig: Take 1 tablet (30 mg) by mouth daily   lisinopril-hydrochlorothiazide (PRINZIDE,ZESTORETIC) 20-25 MG per tablet   No No   Sig: Take 1 tablet by mouth every morning   multivitamin, therapeutic with minerals (MULTI-VITAMIN) TABS   Yes No   Sig: Take 1 tablet by mouth daily   risperiDONE (RISPERDAL) 0.5 MG tablet   No No   Sig: Take 2 tablets (1 mg) by mouth At Bedtime   tamsulosin (FLOMAX) 0.4 MG 24 hr capsule   No No   Sig: Take 1 capsule (0.4 mg) by mouth daily   terazosin (HYTRIN) 5 MG capsule   No No   Sig: TAKE ONE CAPSULE BY MOUTH TWICE A DAY      Facility-Administered Medications: None     Allergies  Allergies   Allergen Reactions     No Known Drug Allergies        Social History  I have reviewed this patient's social history and updated it with pertinent information if needed. Bobby GOMEZ Inezshi  reports that he quit smoking about 65 years ago. He has never used smokeless tobacco. He reports that he drinks alcohol. He reports that he does not use illicit drugs.    Family History  I have reviewed this patient's family history and updated it with pertinent information if needed.   Family History   Problem Relation Age of Onset     C.A.D. Mother      had rheumatic fever as a child     C.A.D. Sister      had MI  had bi pass--another sister had heart operation.      DIABETES Sister       at age 81     Prostate Cancer Father       at age 59     Arthritis Paternal Grandmother      GASTROINTESTINAL  DISEASE Sister      GASTROINTESTINAL DISEASE Sister      esophogus ulcer       Review of Systems  The 10 point Review of Systems is negative other than noted in the HPI or here.     Physical Exam  Temp: 97.1  F (36.2  C) Temp src: Oral BP: 128/59 mmHg Pulse: 57 Heart Rate: 59 Resp: (!) 4 SpO2: 95 % O2 Device: None (Room air)    Vital Signs with Ranges  Temp:  [97.1  F (36.2  C)] 97.1  F (36.2  C)  Pulse:  [57-65] 57  Heart Rate:  [51-62] 59  Resp:  [4-20] 4  BP: ()/(43-93) 128/59 mmHg  SpO2:  [94 %-99 %] 95 %  144 lbs 0 oz    Constitutional:   awake, alert, cooperative, no apparent distress, and appears stated age     Eyes:   Lids and lashes normal, pupils equal, round and reactive to light, extra ocular muscles intact, sclera clear, conjunctiva normal     ENT:   Normocephalic, without obvious abnormality, atraumatic, sinuses nontender on palpation, external ears without lesions, oral pharynx with moist mucous membranes, tonsils without erythema or exudates, gums normal and good dentition.     Neck:   Supple, symmetrical, trachea midline, no adenopathy, thyroid symmetric, not enlarged and no tenderness, skin normal     Hematologic / Lymphatic:   no cervical lymphadenopathy and no supraclavicular lymphadenopathy     Back:   Symmetric, no curvature, spinous processes are non-tender on palpation, paraspinous muscles are non-tender on palpation, no costal vertebral tenderness     Lungs:   No increased work of breathing, good air exchange, clear to auscultation bilaterally, no crackles or wheezing     Cardiovascular:   Normal apical impulse, regular rate and rhythm, normal S1 and S2, no S3 or S4, and no murmur noted     Abdomen:   normal bowel sounds, soft, non-distended, non-tender, no masses palpated, no hepatosplenomegally     Neurologic:   Awake, alert, disoriented to name, place and time.  Cranial nerves II-XII are grossly intact.  Motor is 5 out of 5 bilaterally to  strength and dorsi and plantar flexion  of his feet.  Sensory is intact to light touch throughout         Data  Data reviewed today:  I personally reviewed the EKG tracing showing NSR without acute ischemic changes.    Recent Labs  Lab 02/10/17  1329   WBC 7.2   HGB 12.1*   MCV 96         POTASSIUM 4.0   CHLORIDE 105   CO2 30   BUN 50*   CR 1.79*   ANIONGAP 9   NIYA 8.6   *   ALBUMIN 3.3*   PROTTOTAL 6.1*   BILITOTAL 0.4   ALKPHOS 66   ALT 23   AST 19   TROPI 0.064*       Recent Results (from the past 24 hour(s))   Head CT w/o contrast    Narrative    CT HEAD W/O CONTRAST 2/10/2017 12:37 PM     HISTORY:  \. Code stroke    TECHNIQUE:  Axial images of the head without IV contrast material.  Radiation dose for this scan was reduced using automated exposure  control, adjustment of the mA and/or kV according to patient size, or  iterative reconstruction technique.    COMPARISON: 4/14/2016    FINDINGS: No intracranial hemorrhage, mass lesion, or acute infarct is  seen. No skull fracture. Atrophy. There is bilateral white matter  low-density, likely related to small vessel ischemic disease. Minimal  right maxillary sinus fluid.       Impression    IMPRESSION:  No acute intracranial pathology. Minimal right maxillary  sinusitis.    AMALIA ROMERO MD   CT Head Neck Angio w/o & w Contrast    Narrative    CT ANGIOGRAM OF THE HEAD AND NECK WITHOUT AND WITH CONTRAST  2/10/2017  12:58 PM     COMPARISON: None.    HISTORY: Evaluate for dissection/thromboembolism confusion.    TECHNIQUE:  Precontrast localizing scans were followed by CT  angiography with an injection of 80 mL [id number]-370 nonionic intravenous  contrast material with scans through the head and neck.  Images were  transferred to a separate 3-D workstation where multiplanar  reformations and 3-D images were created.  Estimates of carotid  stenoses are made relative to the distal internal carotid artery  diameters except as noted.      FINDINGS:   Neck CTA: The common carotid arteries  bilaterally are patent without  stenosis. There is calcified and noncalcified atherosclerotic plaque  at the origins of the internal carotid arteries on both sides that  does not result in any significant narrowing on either side. The  cervical internal carotid arteries bilaterally are patent without  stenosis. There is mild-moderate atherosclerotic narrowing at the  origin of the right vertebral artery. The right vertebral artery is  otherwise widely patent throughout the neck and skull base. The left  vertebral artery is tiny with no flow from the origin to the C5-C6  level and with intermittent flow throughout the neck suggesting  chronic origin stenosis/occlusion with probable collateral filling by  either muscular branches in the neck or retrograde filling by the  right vertebral artery.    Head CTA: There is calcified atherosclerotic plaque of the  intracranial distal internal carotid arteries on both sides that does  not result in stenosis on either side. The basilar, bilateral anterior  cerebral, bilateral middle cerebral and bilateral posterior cerebral  arteries are patent and unremarkable. The anterior communicating and  left posterior communicating arteries are patent and unremarkable.      Impression    IMPRESSION:  1. Calcified atherosclerotic plaque of the proximal and distal  internal carotid arteries on both sides that does not result in any  stenosis.  2. Probable chronic origin stenosis/occlusion of the left vertebral  artery with either collateral filling by muscular branches in the neck  versus retrograde flow from the right vertebral artery.  3. Mild-moderate atherosclerotic narrowing at the origin of the  dominant right vertebral artery.  4. Otherwise, normal neck and head CTA.    Radiation dose for this scan was reduced using automated exposure  control, adjustment of the mA and/or kV according to patient size, or  iterative reconstruction technique.    KEIRY SOTO MD   Chest XR,  PA & LAT     Narrative    CHEST TWO VIEWS  2/10/2017 3:42 PM     HISTORY: Altered mental status. Cough.    COMPARISON: None.      Impression    IMPRESSION: Aortic calcification. Chest otherwise negative. Lungs  clear. No significant interval change.    SVETA PARK MD   Abd/pelvis CT - no contrast - [facility] Protocol    Narrative    CT ABDOMEN AND PELVIS WITHOUT CONTRAST 2/10/2017 6:20 PM    HISTORY: Blood in urine. Syncope. Prior appendectomy.    TECHNIQUE: Helical axial scans from the dome of liver through the  pubic symphysis without contrast. Radiation dose for this scan was  reduced using automated exposure control, adjustment of the mA and/or  kV according to patient size, or iterative reconstruction technique.    COMPARISON: None.    FINDINGS: Vascular calcifications, including coronary artery  calcifications. The liver, spleen, and pancreas appear normal without  contrast. Adrenal glands may show slight symmetric thickening  bilaterally with no focal mass. Contrast material seen in renal  collecting systems bilaterally from CT angiogram of the head and neck  done today. There is a 1.2 cm benign cyst in the upper pole of the  right kidney. The kidneys are otherwise unremarkable. However, since  contrast material is present, it is difficult to exclude collecting  system calculi. No evidence for urinary tract obstruction bilaterally.  There is a small hiatal hernia. The bowel mesentery in the upper  abdomen are unremarkable.    Scans through the pelvis show distention of the urinary bladder with a  thickened trabeculated wall and multiple bladder diverticula/cellules.  There is marked enlargement of the prostate gland. There is a very  small left inguinal hernia containing only fat. No free fluid.      Impression    IMPRESSION:  1. Vascular calcifications, including coronary artery calcifications.  2. Mild symmetric thickening of the adrenal glands bilaterally of  uncertain etiology.  3. Distended urinary bladder with  thickened trabeculated wall and  multiple bladder diverticula consistent with chronic bladder outlet  obstruction.  4. Marked enlargement of the prostate gland.  5. Minimal left inguinal hernia containing only fat.

## 2017-02-11 NOTE — PROGRESS NOTES
S-(situation): Patient discharged to home via W/C with spouse    B-(background): Observation goals met     A-(assessment): Pt has some dementia.  VSS.  Pulse at 61 at this time.  Denies discomfort.  Appointments made with doctors for future.  Up and walking in hallway - no deficits noted.Tele shows bradycardic.    R-(recommendations): Discharge instructions reviewed with pt's spouse. Listed belongings gathered and returned to patient.  Patient Education resolved: Yes  New medications-Pt. Has been educated about reason of use and side effects NA  Home and hospital acquired medications returned to patient NA  Medication Bin checked and emptied on discharge Yes

## 2017-02-11 NOTE — PROGRESS NOTES
"S-(situation): Patient registered to Observation. Patient arrived to room 251 via wc from ED    B-(background): Altered mental status, Syncope, Dehydraton    A-(assessment): VSS on RA. Afebrile.  Alert and oriented only to self, was able to say \"I'm at the hospital but don't understand why all the commotion\" Does not recall passing out at lunch time.  LS clear. BS + x4. Tele- NSR.  Denies pain.  Ambulated to/from bathroom with SBA.  Incontinent of stool, wearing pull up- staff assisted with change.  Skin intact.  Family did report a history of dysphagia- family was encouraged to thicken fluid to nectar thickness, but Pt and family have chosen to not follow this and have had no difficulty reported.  Bedside swallow study was passed with ice water no straw, swallowed meds without difficulty in high gaona postion.      R-(recommendations): Orders and observation goals reviewed with Pt and daughter.    Nursing Observation criteria listed below was met:    Skin issues/needs documented:No  Isolation needs addressed, if appropriate: NA  Fall Prevention: Education given and documented and signage used: Yes  Education Assessment documented:Yes  Education Documented (Pre-existing chronic infection such as, MRSA/VRE need education on admission): Yes  New medication patient education completed and documented (Possible Side Effects of Common Medications handout): Yes  Home medications if not able to send immediately home with family stored here: NA  Reminder note placed in discharge instructions: NA  Patient has discharge needs (If yes, please explain): No            "

## 2017-02-11 NOTE — DISCHARGE SUMMARY
[facility] Observation Discharge Summary    Bobby Hanson MRN# [id number]   Age: 86 year old YOB: 1931     Date of Observation:  2/10/2017  Date of Discharge:  2/11/2017   Initial Physician:  Spencer Parikh MD  Discharge Physician:  Kurtis Stoner MD     Home clinic: [facility]  Primary care provider: Rao Lopez          Admission Diagnoses:   Transient alteration of awareness [R40.4]  Dehydration [E86.0]  Bradycardia [R00.1]          Discharge Diagnoses:   Principal diagnosis: <principal problem not specified>    Secondary diagnoses:    Syncope    Dementia with behavioral disturbance, unspecified dementia type    Benign essential hypertension    Hypotension, unspecified hypotension type    Acute kidney injury (H)    Occlusion of left vertebral artery    Atherosclerotic cerebrovascular disease    Benign prostatic hyperplasia with lower urinary tract symptoms    Bradycardia    Elevated troponin    Microscopic hematuria    * No resolved hospital problems. *            Procedures:   No procedures performed during this hospital stay           Discharge Medications:     Outpatient Prescriptions Marked as Taking for the 2/10/17 encounter (Hospital Encounter)   Medication Sig     aspirin 81 MG tablet taking 4 tabs daily       Current Discharge Medication List      CONTINUE these medications which have CHANGED    Details   aspirin 81 MG tablet taking 4 tabs daily  Qty: 100 tablet, Refills: 3         CONTINUE these medications which have NOT CHANGED    Details   terazosin (HYTRIN) 5 MG capsule TAKE ONE CAPSULE BY MOUTH TWICE A DAY  Qty: 180 capsule, Refills: 1    Associated Diagnoses: Benign essential hypertension; Urgency incontinence      risperiDONE (RISPERDAL) 0.5 MG tablet Take 2 tablets (1 mg) by mouth At Bedtime  Qty: 60 tablet, Refills: 0    Associated Diagnoses: Dementia with behavioral disturbance, unspecified dementia type      isosorbide mononitrate (IMDUR) 30  MG 24 hr tablet Take 1 tablet (30 mg) by mouth daily  Qty: 30 tablet, Refills: 1    Associated Diagnoses: Coronary artery disease involving native coronary artery of native heart with unstable angina pectoris (H)      multivitamin, therapeutic with minerals (MULTI-VITAMIN) TABS Take 1 tablet by mouth daily      tamsulosin (FLOMAX) 0.4 MG 24 hr capsule Take 1 capsule (0.4 mg) by mouth daily  Qty: 90 capsule, Refills: 1    Associated Diagnoses: Dysuria; Dementia with behavioral disturbance, unspecified dementia type; Benign non-nodular prostatic hyperplasia with lower urinary tract symptoms      S-Adenosylmethionine (MARYLU-E COMPLETE PO)       Cholecalciferol (VITAMIN D) 2000 UNITS tablet Take 2,000 Units by mouth daily  Qty: 100 tablet, Refills: 3      BRIMONIDINE TARTRATE OP Apply 1 drop to eye 2 times daily. Left eye      DORZOLAMIDE HCL-TIMOLOL MAL OP Apply 1 drop to eye 2 times daily. Both eyes         STOP taking these medications       furosemide (LASIX) 20 MG tablet Comments:   Reason for Stopping:         lisinopril-hydrochlorothiazide (PRINZIDE,ZESTORETIC) 20-25 MG per tablet Comments:   Reason for Stopping:                     Consultations:   No consultations were requested during this hospital stay          Brief History of Illness:   86 year old male patient with advanced dementia presented with 1 days history of recurrent apparently unprovoked syncope.  He was found to be hypotensive initially and was bradycardic and was therefore hospitalized for observation. See ER note and history and physical for details.          Hospital Course:   Patient was observed in the hospital.  Heart rate fluctuated from the mildly bradycardic to normal range with occasional dip in heart rate to the mid 40s although that did not persist.  Telemetry and EKG demonstrated normal sinus rhythm and sinus bradycardia.  Both BUN and creatinine were initially elevated.  After initial treatment in the emergency room with IV fluids,  and while holding his chronic antihypertensive medications, his blood pressure remained normal to mildly increased throughout his hospital stay.  Renal function returned to his previous baseline.  Head CT did not demonstrate any acute abnormalities.  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  No significant carotid stenosis was noted.  He did not have focal neurologic deficits during his hospital stay although neurologic exam was somewhat limited by his dementia.  He had no syncopal events during his hospital stay.  He tolerated advancing diet and activity by discharge.  He did have some intermittent choking with swallowing which his family reported with chronic, and a bedside swallow evaluation performed by nursing was reassuring.  Brain MRI was performed with results pending at the time of discharge.  Family expressed preference to discharge prior to receiving those MRI test results.  After investigation, recurrent syncope due to hypotension from previous vasoactive medications including lisinopril with hydrochlorothiazide and furosemide was suspected.  They were advised to discontinue lisinopril, hydrochlorothiazide, and furosemide at this time until follow-up with his PCP.    I evaluated this patient on the day of discharge.  Blood pressure ranged from normal to mildly elevated.  Heart rate ranged from mild bradycardia to normal.  Mentation appeared to be at baseline according to his family.  He moved all limbs spontaneously and did not have gross focal neurologic deficits.            Discharge Instructions and Follow-Up:   Discharge diet: Regular   Discharge activity: Activity as tolerated   Discharge follow-up: Follow up with primary care provider in 1 week      Pending test results: brain MRI         Discharge Disposition:    Discharged to home      Attestation:  I have reviewed today's vital signs, notes, medications, and test results.    Kurtis Stoner MD

## 2017-02-13 NOTE — TELEPHONE ENCOUNTER
"Discharge Instructions and Follow-Up:   Discharge diet: Regular   Discharge activity: Activity as tolerated   Discharge follow-up: Follow up with primary care provider in 1 week       Pending test results: brain MRI    Hospital/TCU/ED for chronic condition Discharge Protocol    \"Hi, my name is Janet Roberts, a registered nurse, and I am calling from [facility].  I am calling to follow up and see how things are going for you after your recent emergency visit/hospital/TCU stay.\"    Tell me how you are doing now that you are home?\" Patient is home and they are wondering about the brain MRI and chest XRay along with the abdominal/pelvic CT.  They have been waiting for the results.  Routing to PCP to review and call wife's cell with results.      Discharge Instructions    \"Let's review your discharge instructions.  What is/are the follow-up recommendations?  Pt. Response: We have an appointment with Dr. JIN     \"Has an appointment with your primary care provider been scheduled?\"   Yes. (confirm)    \"When you see the provider, I would recommend that you bring your medications with you.\"    Medications    \"Tell me what changed about your medicines when you discharged?\"    Changes to chronic meds?    0-1    \"What questions do you have about your medications?\"    None     New diagnoses of heart failure, COPD, diabetes, or MI?    No              Medication reconciliation completed? Yes  Was MTM referral placed (*Make sure to put transitions as reason for referral)?   No    Call Summary    \"What questions or concerns do you have about your recent visit and your follow-up care?\"     Results on imaging completed. Advice given: PCP will review and call wife's cell.    \"If you have questions or things don't continue to improve, we encourage you contact us through the main clinic number (give number).  Even if the clinic is not open, triage nurses are available 24/7 to help you.     We would like you to know that our clinic has " "extended hours (provide information).  We also have urgent care (provide details on closest location and hours/contact info)\"      \"Thank you for your time and take care!\"   Janet Roberts RN             "

## 2017-02-13 NOTE — TELEPHONE ENCOUNTER
ED / Discharge Outreach Protocol    Patient Contact    Attempt # 1    Was call answered?  No.  Unable to leave message.    Brigitte Barrios RN  [facility]

## 2017-02-15 NOTE — PROGRESS NOTES
"SUBJECTIVE:                                                    Bobyb Hanson is a 86 year old male who presents to clinic today for the following health issues:        Hospital Follow-up Visit:    Hospital/Nursing Home/IP Rehab Facility: [facility]  Date of Admission: 2/10/17  Date of Discharge: 2/11/17  Reason(s) for Admission: syncope            Problems taking medications regularly:  wife       Medication changes since discharge: updated       Problems adhering to non-medication therapy:  None    Summary of hospitalization:  [facility] discharge summary reviewed  Diagnostic Tests/Treatments reviewed.  Follow up needed: weekly bps  Other Healthcare Providers Involved in Patient s Care:         Homecare  Update since discharge: stable. Stable    Post Discharge Medication Reconciliation: discharge medications reconciled, continue medications without change.  Plan of care communicated with family     Coding guidelines for this visit:  Type of Medical   Decision Making Face-to-Face Visit       within 7 Days of discharge Face-to-Face Visit        within 14 days of discharge   Moderate Complexity [id number]   High Complexity [id number]            Problem list and histories reviewed & adjusted, as indicated.    C: NEGATIVE for fever, chills, change in weight  E/M: NEGATIVE for ear, mouth and throat problems  R: NEGATIVE for significant cough or SOB  CV: NEGATIVE for chest pain, palpitations or peripheral edema  : frequency and incontinence  PSYCHIATRIC: dementia    OBJECTIVE:                                                    /64 (BP Location: Right arm, Patient Position: Chair, Cuff Size: Adult Regular)  Pulse 66  Temp 97  F (36.1  C) (Temporal)  Ht 5' 4\" (1.626 m)  Wt 146 lb 1.6 oz (66.3 kg)  SpO2 98%  BMI 25.08 kg/m2  Body mass index is 25.08 kg/(m^2).    See dictated note     ASSESSMENT/PLAN:                                                        Rao Lopez MD, " MD  [facility]

## 2017-02-15 NOTE — MR AVS SNAPSHOT
After Visit Summary   2/15/2017    Bobby Hanson    MRN: [id number]           Patient Information     Date Of Birth          1/31/1931        Visit Information        Provider Department      2/15/2017 3:30 PM Rao Lopez MD [facility]         Follow-ups after your visit        Your next 10 appointments already scheduled     Feb 27, 2017  1:30 PM CST   [facility] Visit with Christiano [facility]ins [facility]   [facility]    [address] [place] [id number] [phone]              Who to contact     If you have questions or need follow up information about today's clinic visit or your schedule please contact [facility] directly at [phone].  Normal or non-critical lab and imaging results will be communicated to you by MyChart, letter or phone within 4 business days after the clinic has received the results. If you do not hear from us within 7 days, please contact the clinic through MyChart or phone. If you have a critical or abnormal lab result, we will notify you by phone as soon as possible.  Submit refill requests through [employer] or call your pharmacy and they will forward the refill request to us. Please allow 3 business days for your refill to be completed.          Additional Information About Your Visit        MyChart Information     [employer] gives you secure access to your electronic health record. If you see a primary care provider, you can also send messages to your care team and make appointments. If you have questions, please call your primary care clinic.  If you do not have a primary care provider, please call [phone] and they will assist you.        Care EveryWhere ID     This is your Care EveryWhere ID. This could be used by other organizations to access your [place] medical records  [id number]        Your Vitals Were     Pulse Temperature Height Pulse Oximetry BMI (Body Mass Index)       66 97  " F (36.1  C) (Temporal) 5' 4\" (1.626 m) 98% 25.08 kg/m2        Blood Pressure from Last 3 Encounters:   02/15/17 168/64   02/11/17 149/76   01/16/17 118/50    Weight from Last 3 Encounters:   02/15/17 146 lb 1.6 oz (66.3 kg)   02/10/17 139 lb 15.9 oz (63.5 kg)   01/16/17 149 lb 14.4 oz (68 kg)              Today, you had the following     No orders found for display       Primary Care Provider Office Phone # Fax #    Rao Lopez [phone]       [facility] [address] [facility] [address]        Thank you!     Thank you for choosing [facility]  for your care. Our goal is always to provide you with excellent care. Hearing back from our patients is one way we can continue to improve our services. Please take a few minutes to complete the written survey that you may receive in the mail after your visit with us. Thank you!             Your Updated Medication List - Protect others around you: Learn how to safely use, store and throw away your medicines at www.disposemymeds.org.          This list is accurate as of: 2/15/17  4:40 PM.  Always use your most recent med list.                   Brand Name Dispense Instructions for use    aspirin 81 MG tablet     100 tablet    taking 4 tabs daily       BRIMONIDINE TARTRATE OP      Apply 1 drop to eye 2 times daily. Left eye       DORZOLAMIDE HCL-TIMOLOL MAL OP      Apply 1 drop to eye 2 times daily. Both eyes       isosorbide mononitrate 30 MG 24 hr tablet    IMDUR    30 tablet    Take 1 tablet (30 mg) by mouth daily       Multi-vitamin Tabs tablet      Take 1 tablet by mouth daily       risperiDONE 1 MG tablet    risperDAL    30 tablet    Take 1 tablet (1 mg) by mouth every evening       MARYLU-E COMPLETE PO          tamsulosin 0.4 MG capsule    FLOMAX    90 capsule    Take 1 capsule (0.4 mg) by mouth daily       terazosin 5 MG capsule    HYTRIN    180 capsule    TAKE ONE CAPSULE BY MOUTH TWICE A DAY       vitamin D 2000 " UNITS tablet     100 tablet    Take 2,000 Units by mouth daily

## 2017-02-15 NOTE — NURSING NOTE
"Chief Complaint   Patient presents with     Hospital F/U       Initial /64 (BP Location: Right arm, Patient Position: Chair, Cuff Size: Adult Regular)  Pulse 66  Temp 97  F (36.1  C) (Temporal)  Ht 5' 4\" (1.626 m)  Wt 146 lb 1.6 oz (66.3 kg)  SpO2 98%  BMI 25.08 kg/m2 Estimated body mass index is 25.08 kg/(m^2) as calculated from the following:    Height as of this encounter: 5' 4\" (1.626 m).    Weight as of this encounter: 146 lb 1.6 oz (66.3 kg).  Medication Reconciliation: complete    "

## 2017-02-16 NOTE — PROGRESS NOTES
SUBJECTIVE:  Bobby Hanson is an 86-year-old man in accompanied by his wife and his daughter, unfortunately Bobby has been getting rather advanced dementia and is not really much of a historian.  He was recently in the emergency room having had 2 syncopal episodes where he actually passed out completely.  He was kept in the hospital overnight, observed, evaluated and found to have a low blood pressure, low pulse, be somewhat dehydrated.  He had numerous testing done including a swallow study which was okay.  He had scans done of his head, cardiogram monitoring, he did run some bradycardias but did not have any further syncopal episodes in the hospital after receiving some fluids and holding his blood pressure pills and diuretics.  He is in now for followup.  They had discontinued his lisinopril with hydrochlorothiazide and his Lasix.  He is still on both tamsulosin and terazosin for his urinary incontinence, it is unsure as to whether that has been helpful as he is demented and diapered.  He has had less sweating since stopping diuretics.  He is still on Imdur which would lower pressure.  Blood pressure today is elevated, somewhat systolically but he is back to pretty much his baseline.  He is on risperidone, although has not had that for a long while.  Renal function had returned to normal on discharge.  I do not have a report on an event monitor that apparently was placed.  Presumably his syncopal episode but her from a blood pressure with his combination of blood pressure medications and that seems to have improved, will stay on his current medications.  We will ask him to come in to float nurse once a week.  If his blood pressure continues to elevate, will have to resume one of his blood pressure medications, my choice would probably be resuming lisinopril but at a lower dose.  Right now his pulse is 66, but sinus type rhythm, no carotid bruits.  Lungs sound clear, does not appear to have any edema.  He does have  a bit of a pectus excavatum, but he has had that all his life.         CHARLEY MENDOZA M.D.             D: 02/15/2017 17:28   T: 2017 06:11   MT: ARNALDO#[id number]      Name:     TI GEIGER   MRN:      [id number]        Account:      [id number]   :      1931           Visit Date:   02/15/2017      Document: [id number]

## 2017-02-23 NOTE — TELEPHONE ENCOUNTER
Called patient to let him know the ziopatch is still in process. He could call us back here in cardiology. Left direct cardiology phone number.

## 2017-02-23 NOTE — TELEPHONE ENCOUNTER
----- Message from Kelly Castro RN sent at 2/23/2017  7:53 AM CST -----  Regarding: FW: EP RESULTS  Contact: [phone]  Kya,   This is Lyons pt.     ----- Message -----     From: Sarah Heredia     Sent: 2/22/2017   1:18 PM       To: Kelly Castro RN  Subject: EP RESULTS                                       Call from Sarah at [facility] regarding an electrophysiology appt the PT had and wondering about the results as they never heard anything.  I let her know that there is an upcoming appt with Dr. Wiggins at the [place] location that appears to note discussing this test, but they want to make sure of that.  Please call PT at [phone].    Thank You,  Sarah    Please DO NOT send this message and/or reply back to sender.  Call Center Representatives DO NOT respond to messages.

## 2017-02-27 NOTE — LETTER
"2/27/2017      RE: Bobby Hanson  [address] [facility] [address]       Dear Colleague,    Thank you for the opportunity to participate in the care of your patient, Bobby Hanson, at the [facility]. Please see a copy of my visit note below.          Clinical Cardiology Consultation    Chief Complaint: abnormal stress nuclear images, sinus bradycardia    HPI: [facility]. He has increasing dementia. He will talk to her when she talks to him but is otherwise non-communicative. He had no complaints. His slow heart rate was [facility]. For some reason troponins were checked in the ED, though his wife reports he had not been complaining of chest pain or any pain/discomfort. Coronary artery disease would not be expected to cause sinus bradycardia. Nonetheless, the troponin was mildly elevated. This lead to a stress nuclear study which showed possible ischemia in more than one territory.     Patient answers questions but answers \"no\" or \"fine\". He's wife reports he does not complain to her. She has not noticed evidence of chest pain or chest discomfort or unusual shortness of breath or dyspnea on exertion. He is very inactive.     [id number] Interval history: at our last visit, after a long discussion, the family decided not to pursue further evaluation of the abnormal stress test. He had a monitor placed to assess his bradycardia.    I personally read that study. He has brief atrial runs. He has sinus bradycardia with the slowest heart rates in the early am, as expected. There were no significant pauses and no AV block seen.    He had another syncopal episode but after monitor was completed.      Current Outpatient Prescriptions   Medication Sig Dispense Refill     UNABLE TO FIND MEDICATION NAME: Ensure 1 bottle once daily       risperiDONE (RISPERDAL) 1 MG tablet Take 1 tablet (1 " mg) by mouth every evening 30 tablet 0     aspirin 81 MG tablet taking 4 tabs daily 100 tablet 3     isosorbide mononitrate (IMDUR) 30 MG 24 hr tablet Take 1 tablet (30 mg) by mouth daily 30 tablet 1     tamsulosin (FLOMAX) 0.4 MG 24 hr capsule Take 1 capsule (0.4 mg) by mouth daily 90 capsule 1     Cholecalciferol (VITAMIN D) 2000 UNITS tablet Take 2,000 Units by mouth daily 100 tablet 3     BRIMONIDINE TARTRATE OP Apply 1 drop to eye 2 times daily. Left eye       DORZOLAMIDE HCL-TIMOLOL MAL OP Apply 1 drop to eye 2 times daily. Both eyes         Past Medical History   Diagnosis Date     Caregiver burden 2016     strongly advised placement into nursing care facility as his dementia worsens.      Hypertension        Past Surgical History   Procedure Laterality Date     C appendectomy       Hc remove tonsils/adenoids,<13 y/o         Family History   Problem Relation Age of Onset     C.A.D. Mother      had rheumatic fever as a child     C.A.D. Sister      had MI  had bi pass--another sister had heart operation.      DIABETES Sister       at age 81     Prostate Cancer Father       at age 59     Arthritis Paternal Grandmother      GASTROINTESTINAL DISEASE Sister      GASTROINTESTINAL DISEASE Sister      esophogus ulcer       Social History   Substance Use Topics     Smoking status: Former Smoker     Packs/day: 0.00     Years: 6.00     Quit date: 1952     Smokeless tobacco: Never Used     Alcohol use 0.0 oz/week     0 Standard drinks or equivalent per week      Comment: rare       Allergies   Allergen Reactions     No Known Drug Allergies          ROS: not obtainable due to his dementia    Physical Examination:  Vitals: Wt 67.9 kg (149 lb 9.6 oz)  BMI 25.68 kg/m2  BMI= Body mass index is 25.68 kg/(m^2).    GENERAL APPEARANCE: frail, alert but uninterested, no distress  HEENT: no icterus, mucosa moist, no cyanosis.  NECK:  JVP is not visible  CHEST: lungs clear to auscultation - no rales, rhonchi or  wheezes, no use of accessory muscles, no retractions, respirations are unlabored, normal respiratory rate, no kyphosis, no scoliosis  CARDIOVASCULAR: regular rhythm, normal S1S2, no   ABDOMEN: soft, non tender, bowel sounds normal  EXTREMITIES: no clubbing, cyanosis or edema  NEURO: alert but only speaks if asked questions, ambulates without assistance  VASC: warm      Laboratory:    Results for TI GEIEGR (MRN [id number]) as of 1/16/2017 09:49   Ref. Range 12/24/2016 11:55   Sodium Latest Ref Range: 133-144 mmol/L 146 (H)   Potassium Latest Ref Range: 3.4-5.3 mmol/L 3.8   Chloride Latest Ref Range:  mmol/L 107   Carbon Dioxide Latest Ref Range: 20-32 mmol/L 32   Urea Nitrogen Latest Ref Range: 7-30 mg/dL 27   Creatinine Latest Ref Range: 0.66-1.25 mg/dL 1.16   GFR Estimate Latest Ref Range: >60 mL/min/1.7m2 60 (L)       GATED MYOCARDIAL PERFUSION SCINTIGRAPHY WITH INTRAVENOUS PHARMACOLOGIC  VASODILATATION LEXISCAN -ONE DAY STUDY      1/3/2017 12:31 PM  TI GEIGER  85 years  Male  1/31/1931.     Indication/Clinical History: Symptomatic bradycardia and positive  cardiac enzymes     Impression  1.  Myocardial perfusion imaging using single isotope technique  demonstrated several areas of ischemia: a  small area of apical and  anteroapical ischemia, a small area of inferoseptal ischemia at mid  ventricle, a small-moderate  area of lateral and inferolateral  ischemia at the base to mid ventricle.    2. Gated images demonstrated normal wall motion and thickening.  The  left ventricular systolic function is 64% at rest and 63% post stress.  3. Compared to the prior study from there is no prior study available  for comparison .    Assessment and recommendations:    1) Sinus bradycardia - this would not be likely to be due to coronary artery disease. He is relatively inactive. There is no history of of syncope.     Monitor did not disclose a significant bradyarrhythmia.     We discussed placement of an  implantable loop recorder for longer term monitoring.     His wife will be having TAVR in [place]. She is considering seeing if a cardiologist there would see her . That would be perfectly fine. I understand the logistic difficulties she faces.    I appreciate the chance to help with Mr. Hanson's care. Please let me know if you have any questions or concerns.    Christiano Wiggins MD

## 2017-02-27 NOTE — MR AVS SNAPSHOT
"              After Visit Summary   2/27/2017    Bobby Hanson    MRN: [id number]           Patient Information     Date Of Birth          1/31/1931        Visit Information        Provider Department      2/27/2017 1:30 PM Christiano Wiggins MD [facility]        Today's Diagnoses     Dementia with behavioral disturbance, unspecified dementia type    -  1    Sinoatrial node dysfunction (H)           Follow-ups after your visit        Who to contact     If you have questions or need follow up information about today's clinic visit or your schedule please contact [facility] directly at [phone].  Normal or non-critical lab and imaging results will be communicated to you by MyChart, letter or phone within 4 business days after the clinic has received the results. If you do not hear from us within 7 days, please contact the clinic through [email or web address] or phone. If you have a critical or abnormal lab result, we will notify you by phone as soon as possible.  Submit refill requests through Qapa or call your pharmacy and they will forward the refill request to us. Please allow 3 business days for your refill to be completed.          Additional Information About Your Visit        MyChart Information     Qapa gives you secure access to your electronic health record. If you see a primary care provider, you can also send messages to your care team and make appointments. If you have questions, please call your primary care clinic.  If you do not have a primary care provider, please call [phone] and they will assist you.        Care EveryWhere ID     This is your Care EveryWhere ID. This could be used by other organizations to access your [place] medical records  [id number]        Your Vitals Were     Pulse Height Pulse Oximetry BMI (Body Mass Index)          66 1.626 m (5' 4\") 97% 25.68 kg/m2         Blood Pressure from Last 3 Encounters:   02/27/17 90/52   02/15/17 168/64 "   02/11/17 149/76    Weight from Last 3 Encounters:   02/27/17 67.9 kg (149 lb 9.6 oz)   02/15/17 66.3 kg (146 lb 1.6 oz)   02/10/17 63.5 kg (139 lb 15.9 oz)              We Performed the Following     Compression stockings     Follow-Up with Electrophysiologist        Primary Care Provider Office Phone # Fax #    Rao Lopez [phone]       [facility] [address] [facility] [address]        Thank you!     Thank you for choosing [facility]  for your care. Our goal is always to provide you with excellent care. Hearing back from our patients is one way we can continue to improve our services. Please take a few minutes to complete the written survey that you may receive in the mail after your visit with us. Thank you!             Your Updated Medication List - Protect others around you: Learn how to safely use, store and throw away your medicines at www.disposemymeds.org.          This list is accurate as of: 2/27/17  2:18 PM.  Always use your most recent med list.                   Brand Name Dispense Instructions for use    aspirin 81 MG tablet     100 tablet    taking 4 tabs daily       BRIMONIDINE TARTRATE OP      Apply 1 drop to eye 2 times daily. Left eye       DORZOLAMIDE HCL-TIMOLOL MAL OP      Apply 1 drop to eye 2 times daily. Both eyes       isosorbide mononitrate 30 MG 24 hr tablet    IMDUR    30 tablet    Take 1 tablet (30 mg) by mouth daily       risperiDONE 1 MG tablet    risperDAL    30 tablet    Take 1 tablet (1 mg) by mouth every evening       tamsulosin 0.4 MG capsule    FLOMAX    90 capsule    Take 1 capsule (0.4 mg) by mouth daily       UNABLE TO FIND      MEDICATION NAME: Ensure 1 bottle once daily       vitamin D 2000 UNITS tablet     100 tablet    Take 2,000 Units by mouth daily

## 2017-02-27 NOTE — LETTER
"2/27/2017    Rao Lopez MD  [facility]   [address] [facility] [address]    RE: Bobby PATRICIA Hanson       Dear Colleague,    I had the pleasure of seeing Bobby Hanson in the [facility].    Chief Complaint: abnormal stress nuclear images, sinus bradycardia    HPI: [facility]. He has increasing dementia. He will talk to her when she talks to him but is otherwise non-communicative. He had no complaints. His slow heart rate was [facility]. For some reason troponins were checked in the ED, though his wife reports he had not been complaining of chest pain or any pain/discomfort. Coronary artery disease would not be expected to cause sinus bradycardia. Nonetheless, the troponin was mildly elevated. This lead to a stress nuclear study which showed possible ischemia in more than one territory.     Patient answers questions but answers \"no\" or \"fine\". He's wife reports he does not complain to her. She has not noticed evidence of chest pain or chest discomfort or unusual shortness of breath or dyspnea on exertion. He is very inactive.     [id number] Interval history: at our last visit, after a long discussion, the family decided not to pursue further evaluation of the abnormal stress test. He had a monitor placed to assess his bradycardia.    I personally read that study. He has brief atrial runs. He has sinus bradycardia with the slowest heart rates in the early am, as expected. There were no significant pauses and no AV block seen.        Current Outpatient Prescriptions   Medication Sig Dispense Refill     UNABLE TO FIND MEDICATION NAME: Ensure 1 bottle once daily       risperiDONE (RISPERDAL) 1 MG tablet Take 1 tablet (1 mg) by mouth every evening 30 tablet 0     aspirin 81 MG tablet taking 4 tabs daily 100 tablet 3     isosorbide mononitrate (IMDUR) 30 MG 24 hr tablet Take 1 tablet (30 mg) by " mouth daily 30 tablet 1     tamsulosin (FLOMAX) 0.4 MG 24 hr capsule Take 1 capsule (0.4 mg) by mouth daily 90 capsule 1     Cholecalciferol (VITAMIN D) 2000 UNITS tablet Take 2,000 Units by mouth daily 100 tablet 3     BRIMONIDINE TARTRATE OP Apply 1 drop to eye 2 times daily. Left eye       DORZOLAMIDE HCL-TIMOLOL MAL OP Apply 1 drop to eye 2 times daily. Both eyes         Past Medical History   Diagnosis Date     Caregiver burden 2016     strongly advised placement into nursing care facility as his dementia worsens.      Hypertension        Past Surgical History   Procedure Laterality Date     C appendectomy       Hc remove tonsils/adenoids,<11 y/o         Family History   Problem Relation Age of Onset     C.A.D. Mother      had rheumatic fever as a child     C.A.D. Sister      had MI  had bi pass--another sister had heart operation.      DIABETES Sister       at age 81     Prostate Cancer Father       at age 59     Arthritis Paternal Grandmother      GASTROINTESTINAL DISEASE Sister      GASTROINTESTINAL DISEASE Sister      esophogus ulcer       Social History   Substance Use Topics     Smoking status: Former Smoker     Packs/day: 0.00     Years: 6.00     Quit date: 1952     Smokeless tobacco: Never Used     Alcohol use 0.0 oz/week     0 Standard drinks or equivalent per week      Comment: rare       Allergies   Allergen Reactions     No Known Drug Allergies          ROS: not obtainable due to his dementia    Physical Examination:  Vitals: Wt 67.9 kg (149 lb 9.6 oz)  BMI 25.68 kg/m2  BMI= Body mass index is 25.68 kg/(m^2).    GENERAL APPEARANCE: frail, alert but uninterested, no distress  HEENT: no icterus, mucosa moist, no cyanosis.  NECK:  JVP is not visible  CHEST: lungs clear to auscultation - no rales, rhonchi or wheezes, no use of accessory muscles, no retractions, respirations are unlabored, normal respiratory rate, no kyphosis, no scoliosis  CARDIOVASCULAR: regular rhythm, normal  S1S2, no   ABDOMEN: soft, non tender, bowel sounds normal  EXTREMITIES: no clubbing, cyanosis or edema  NEURO: alert but only speaks if asked questions, ambulates without assistance  VASC: warm      Laboratory:    Results for TI GEIGER (MRN [id number]) as of 1/16/2017 09:49   Ref. Range 12/24/2016 11:55   Sodium Latest Ref Range: 133-144 mmol/L 146 (H)   Potassium Latest Ref Range: 3.4-5.3 mmol/L 3.8   Chloride Latest Ref Range:  mmol/L 107   Carbon Dioxide Latest Ref Range: 20-32 mmol/L 32   Urea Nitrogen Latest Ref Range: 7-30 mg/dL 27   Creatinine Latest Ref Range: 0.66-1.25 mg/dL 1.16   GFR Estimate Latest Ref Range: >60 mL/min/1.7m2 60 (L)       GATED MYOCARDIAL PERFUSION SCINTIGRAPHY WITH INTRAVENOUS PHARMACOLOGIC  VASODILATATION LEXISCAN -ONE DAY STUDY      1/3/2017 12:31 PM  TI GEIGER  85 years  Male  1/31/1931.     Indication/Clinical History: Symptomatic bradycardia and positive  cardiac enzymes     Impression  1.  Myocardial perfusion imaging using single isotope technique  demonstrated several areas of ischemia: a  small area of apical and  anteroapical ischemia, a small area of inferoseptal ischemia at mid  ventricle, a small-moderate  area of lateral and inferolateral  ischemia at the base to mid ventricle.    2. Gated images demonstrated normal wall motion and thickening.  The  left ventricular systolic function is 64% at rest and 63% post stress.  3. Compared to the prior study from there is no prior study available  for comparison .    Assessment and recommendations:    1) Sinus bradycardia - this would not be likely to be due to coronary artery disease. He is relatively inactive. There is no history of of syncope.     - Ziopatch ordered to assess for pauses/bradycardia that could be of concern, even in the absence of symptoms.      2) Abnormal stress nuclear images - We discussed at our last visit that typically would recommend cath to evaluate for multivessel CAD and that if this is  the case, often coronary artery bypass surgery is recommended. Given his dementia the question they need to decide, is based on what they know of their /father is wether or not that is something he would agree to. It is possible stenting would be possible over coronary artery bypass surgery but there is a lack of compelling evidence that stents, as opposed to surgery, prolongs life or reduces the risk of MI.     After careful discussion they believe he would prefer to not undergo an invasive evaluation. Of course, should he develop symptoms of angina or heart failure that decision can be re-addressed.    I appreciate the chance to help with Mr. Hanson's care. Please let me know if you have any questions or concerns.    Sincerely,     Christiano Wiggins MD     [facility]

## 2017-02-27 NOTE — PROGRESS NOTES
"      Clinical Cardiology Consultation    Chief Complaint: abnormal stress nuclear images, sinus bradycardia    HPI: [facility]. He has increasing dementia. He will talk to her when she talks to him but is otherwise non-communicative. He had no complaints. His slow heart rate was [facility]. For some reason troponins were checked in the ED, though his wife reports he had not been complaining of chest pain or any pain/discomfort. Coronary artery disease would not be expected to cause sinus bradycardia. Nonetheless, the troponin was mildly elevated. This lead to a stress nuclear study which showed possible ischemia in more than one territory.     Patient answers questions but answers \"no\" or \"fine\". He's wife reports he does not complain to her. She has not noticed evidence of chest pain or chest discomfort or unusual shortness of breath or dyspnea on exertion. He is very inactive.     [id number] Interval history: at our last visit, after a long discussion, the family decided not to pursue further evaluation of the abnormal stress test. He had a monitor placed to assess his bradycardia.    I personally read that study. He has brief atrial runs. He has sinus bradycardia with the slowest heart rates in the early am, as expected. There were no significant pauses and no AV block seen.    He had another syncopal episode but after monitor was completed.      Current Outpatient Prescriptions   Medication Sig Dispense Refill     UNABLE TO FIND MEDICATION NAME: Ensure 1 bottle once daily       risperiDONE (RISPERDAL) 1 MG tablet Take 1 tablet (1 mg) by mouth every evening 30 tablet 0     aspirin 81 MG tablet taking 4 tabs daily 100 tablet 3     isosorbide mononitrate (IMDUR) 30 MG 24 hr tablet Take 1 tablet (30 mg) by mouth daily 30 tablet 1     tamsulosin (FLOMAX) 0.4 MG 24 hr capsule Take 1 capsule (0.4 mg) by mouth daily 90 capsule 1     " Cholecalciferol (VITAMIN D) 2000 UNITS tablet Take 2,000 Units by mouth daily 100 tablet 3     BRIMONIDINE TARTRATE OP Apply 1 drop to eye 2 times daily. Left eye       DORZOLAMIDE HCL-TIMOLOL MAL OP Apply 1 drop to eye 2 times daily. Both eyes         Past Medical History   Diagnosis Date     Caregiver burden 2016     strongly advised placement into nursing care facility as his dementia worsens.      Hypertension        Past Surgical History   Procedure Laterality Date     C appendectomy       Hc remove tonsils/adenoids,<13 y/o         Family History   Problem Relation Age of Onset     C.A.D. Mother      had rheumatic fever as a child     C.A.D. Sister      had MI  had bi pass--another sister had heart operation.      DIABETES Sister       at age 81     Prostate Cancer Father       at age 59     Arthritis Paternal Grandmother      GASTROINTESTINAL DISEASE Sister      GASTROINTESTINAL DISEASE Sister      esophogus ulcer       Social History   Substance Use Topics     Smoking status: Former Smoker     Packs/day: 0.00     Years: 6.00     Quit date: 1952     Smokeless tobacco: Never Used     Alcohol use 0.0 oz/week     0 Standard drinks or equivalent per week      Comment: rare       Allergies   Allergen Reactions     No Known Drug Allergies          ROS: not obtainable due to his dementia    Physical Examination:  Vitals: Wt 67.9 kg (149 lb 9.6 oz)  BMI 25.68 kg/m2  BMI= Body mass index is 25.68 kg/(m^2).    GENERAL APPEARANCE: frail, alert but uninterested, no distress  HEENT: no icterus, mucosa moist, no cyanosis.  NECK:  JVP is not visible  CHEST: lungs clear to auscultation - no rales, rhonchi or wheezes, no use of accessory muscles, no retractions, respirations are unlabored, normal respiratory rate, no kyphosis, no scoliosis  CARDIOVASCULAR: regular rhythm, normal S1S2, no   ABDOMEN: soft, non tender, bowel sounds normal  EXTREMITIES: no clubbing, cyanosis or edema  NEURO: alert but only  speaks if asked questions, ambulates without assistance  VASC: warm      Laboratory:    Results for TI GEIGER (MRN [id number]) as of 1/16/2017 09:49   Ref. Range 12/24/2016 11:55   Sodium Latest Ref Range: 133-144 mmol/L 146 (H)   Potassium Latest Ref Range: 3.4-5.3 mmol/L 3.8   Chloride Latest Ref Range:  mmol/L 107   Carbon Dioxide Latest Ref Range: 20-32 mmol/L 32   Urea Nitrogen Latest Ref Range: 7-30 mg/dL 27   Creatinine Latest Ref Range: 0.66-1.25 mg/dL 1.16   GFR Estimate Latest Ref Range: >60 mL/min/1.7m2 60 (L)       GATED MYOCARDIAL PERFUSION SCINTIGRAPHY WITH INTRAVENOUS PHARMACOLOGIC  VASODILATATION LEXISCAN -ONE DAY STUDY      1/3/2017 12:31 PM  TI GEIGER  85 years  Male  1/31/1931.     Indication/Clinical History: Symptomatic bradycardia and positive  cardiac enzymes     Impression  1.  Myocardial perfusion imaging using single isotope technique  demonstrated several areas of ischemia: a  small area of apical and  anteroapical ischemia, a small area of inferoseptal ischemia at mid  ventricle, a small-moderate  area of lateral and inferolateral  ischemia at the base to mid ventricle.    2. Gated images demonstrated normal wall motion and thickening.  The  left ventricular systolic function is 64% at rest and 63% post stress.  3. Compared to the prior study from there is no prior study available  for comparison .    Assessment and recommendations:    1) Sinus bradycardia - this would not be likely to be due to coronary artery disease. He is relatively inactive. There is no history of of syncope.     Monitor did not disclose a significant bradyarrhythmia.     We discussed placement of an implantable loop recorder for longer term monitoring.     His wife will be having TAVR in [place]. She is considering seeing if a cardiologist there would see her . That would be perfectly fine. I understand the logistic difficulties she faces.    I appreciate the chance to help with Mr. Geiger's  care. Please let me know if you have any questions or concerns.    Christiano Wiggins MD

## 2017-03-02 NOTE — ED AVS SNAPSHOT
[facility] Emergency Department    [address] [place] [address]    Phone:  [phone]    Fax:  [phone]                                       Bobby Hanson   MRN: [id number]    Department:  [facility] Department   Date of Visit:  3/2/2017           After Visit Summary Signature Page     I have received my discharge instructions, and my questions have been answered. I have discussed any challenges I see with this plan with the nurse or doctor.    ..........................................................................................................................................  Patient/Patient Representative Signature      ..........................................................................................................................................  Patient Representative Print Name and Relationship to Patient    ..................................................               ................................................  Date                                            Time    ..........................................................................................................................................  Reviewed by Signature/Title    ...................................................              ..............................................  Date                                                            Time

## 2017-03-02 NOTE — ED NOTES
Patient brought to ED via EMS from home after being discovered on the floor by his spouse. Patient has h/o dementia and memory issues. No trauma noted other than bilateral knee abrasions. Teresa Sanyd RN

## 2017-03-02 NOTE — ED AVS SNAPSHOT
[facility] Emergency Department    [address] [place] [address]    Phone:  [phone]    Fax:  [phone]                                       Bobby Hanson   MRN: [id number]    Department:  [facility] Emergency Department   Date of Visit:  3/2/2017           Patient Information     Date Of Birth          1/31/1931        Your diagnoses for this visit were:     Fall, initial encounter     Generalized muscle weakness     Dementia with behavioral disturbance, unspecified dementia type        You were seen by Aubrey, Hood JIN MD.      Follow-up Information     Follow up with Rao Lopez MD In 1 week.    Specialty:  Family Practice    Contact information:    [facility]  [address] [facility] [address] [phone]          Follow up with [facility] Emergency Department.    Specialty:  EMERGENCY MEDICINE    Why:  If symptoms worsen    Contact information:    [address] [place] [address] [phone]    Additional information:    From [address]: Exit at [address] on south side of [place]. Turn right on [address]. Turn left at stoplight on [address]. [facility] will be in view two blocks ahead      Discharge References/Attachments     FALL, UNCERTAIN CAUSE (ENGLISH)      24 Hour Appointment Hotline       To make an appointment at any [facility], call [phone] ([phone]). If you don't have a family doctor or clinic, we will help you find one. [facility] clinics are conveniently located to serve the needs of you and your family.             Review of your medicines      Our records show that you are taking the medicines listed below. If these are incorrect, please call your family doctor or clinic.        Dose / Directions Last dose taken    aspirin 81 MG tablet   Quantity:  100 tablet        taking 4 tabs daily   Refills:  3        BRIMONIDINE TARTRATE OP   Dose:  1 drop        Apply 1  drop to eye 2 times daily. Left eye   Refills:  0        DORZOLAMIDE HCL-TIMOLOL MAL OP   Dose:  1 drop        Apply 1 drop to eye 2 times daily. Both eyes   Refills:  0        isosorbide mononitrate 30 MG 24 hr tablet   Commonly known as:  IMDUR   Dose:  30 mg   Quantity:  30 tablet        Take 1 tablet (30 mg) by mouth daily   Refills:  1        risperiDONE 1 MG tablet   Commonly known as:  risperDAL   Dose:  1 mg   Quantity:  30 tablet        Take 1 tablet (1 mg) by mouth every evening   Refills:  0        tamsulosin 0.4 MG capsule   Commonly known as:  FLOMAX   Dose:  0.4 mg   Quantity:  90 capsule        Take 1 capsule (0.4 mg) by mouth daily   Refills:  1        UNABLE TO FIND        MEDICATION NAME: Ensure 1 bottle once daily   Refills:  0        vitamin D 2000 UNITS tablet   Dose:  2000 Units   Quantity:  100 tablet        Take 2,000 Units by mouth daily   Refills:  3                Procedures and tests performed during your visit     Basic metabolic panel    CBC with platelets differential    CT Lumbar Spine w/o Contrast    XR Knee Bilateral 3 Views      Orders Needing Specimen Collection     None      Pending Results     Date and Time Order Name Status Description    3/2/2017 1214 CT Lumbar Spine w/o Contrast In process             Pending Culture Results     No orders found from 2/28/2017 to 3/3/2017.            Thank you for choosing [facility]       Thank you for choosing [facility] for your care. Our goal is always to provide you with excellent care. Hearing back from our patients is one way we can continue to improve our services. Please take a few minutes to complete the written survey that you may receive in the mail after you visit with us. Thank you!        [employer] Information     [employer] gives you secure access to your electronic health record. If you see a primary care provider, you can also send messages to your care team and make appointments. If you have questions, please call your primary care  clinic.  If you do not have a primary care provider, please call [phone] and they will assist you.        Care EveryWhere ID     This is your Care EveryWhere ID. This could be used by other organizations to access your [facility] medical records  [id number]        After Visit Summary       This is your record. Keep this with you and show to your community pharmacist(s) and doctor(s) at your next visit.

## 2017-03-02 NOTE — ED PROVIDER NOTES
History     Chief Complaint   Patient presents with     Fall     HPI  Bobby Hanson is a 86 year old male who is brought to the emergency room by ambulance. Patient is a poor historian secondary to his dementia but tells me that he feels fine. His wife reports that he would have normally been up at this time and she had not seen them yet. She went to check on him and found him on the ground with his depends diaper and nothing else on. He usually sleeps and is close. He was trying to put a shoe on. She does not know if he went to the ground while sitting in a chair or if he fell. It didn't appear that he had struck his head. She tried to get him up but she was unable and he was unable. He does admit that his knees hurt when I palpate them that otherwise states no to any question of pain. The wife has been pursuing options with long-term care given his dementia. She has an appointment on Friday. [facility]. She is also looked into adult .    Patient Active Problem List   Diagnosis     CARDIOVASCULAR SCREENING; LDL GOAL LESS THAN 130     Advanced directives, counseling/discussion     Health Care Home     Dementia with behavioral disturbance, unspecified dementia type     Benign non-nodular prostatic hyperplasia with lower urinary tract symptoms     Urgency incontinence     Caregiver burden     Benign essential hypertension     Syncope     Hypotension, unspecified hypotension type     Benign prostatic hyperplasia with lower urinary tract symptoms     Bradycardia     Elevated troponin     Acute kidney injury (H)     Microscopic hematuria     Occlusion of left vertebral artery     Atherosclerotic cerebrovascular disease     No current facility-administered medications for this encounter.      Current Outpatient Prescriptions   Medication     UNABLE TO FIND     risperiDONE (RISPERDAL) 1 MG tablet     aspirin 81 MG tablet     isosorbide  "mononitrate (IMDUR) 30 MG 24 hr tablet     tamsulosin (FLOMAX) 0.4 MG 24 hr capsule     Cholecalciferol (VITAMIN D) 2000 UNITS tablet     BRIMONIDINE TARTRATE OP     DORZOLAMIDE HCL-TIMOLOL MAL OP     Allergies   Allergen Reactions     No Known Drug Allergies          I have reviewed the Medications, Allergies, Past Medical and Surgical History, and Social History in the Epic system.    Review of Systems   Unable to perform ROS: Dementia       Physical Exam   BP: 148/64  Heart Rate: 54  Temp: 97  F (36.1  C)  Resp: 16  Height: 162.6 cm (5' 4\")  Weight: 65.8 kg (145 lb)  SpO2: 96 %  Physical Exam   Constitutional: He is oriented to person, place, and time.   Alert 86-year-old male who does not appear to be in any pain or discomfort. His affect is flat and not reactive. He appears comfortable and pain-free. He is breathing at ease and has normal vital signs./64  Temp 97  F (36.1  C) (Temporal)  Resp 16  Ht 1.626 m (5' 4\")  Wt 65.8 kg (145 lb)  SpO2 96%  BMI 24.89 kg/m2     HENT:   Head: Normocephalic and atraumatic.   Right Ear: External ear normal.   Left Ear: External ear normal.   Mouth/Throat: Oropharynx is clear and moist.   His face and scalp was palpated without any visible areas of swelling abrasion or trauma. There is no tenderness to his skull face or neck on palpation. Negative posadas sign negative raccoon eyes and negative grind or otorrhea. No blood in the oral and nasopharynx. No dental injury.   Eyes: Conjunctivae and EOM are normal. Pupils are equal, round, and reactive to light.   Neck: Normal range of motion. Neck supple.   Neck was palpated no spinous process tenderness. Is not up. He has any discomfort with passive range of motion.   Cardiovascular: Normal rate, regular rhythm and normal heart sounds.    Pulmonary/Chest: Effort normal and breath sounds normal.   No evidence of injury on inspection and no pain with AP or lateral pressure of his chest. Lungs are clear heart regular rate and " rhythm without murmur.   Abdominal: Soft. There is no tenderness.   Musculoskeletal: Normal range of motion.        Left knee: He exhibits no laceration. Tenderness found.        Legs:  He has minor superficial abrasions over the tibial tuberosities. There is no surrounding bruising or swelling. These may be friction burns from trying to get up.   Neurological: He is alert and oriented to person, place, and time.   Skin: Skin is warm and dry.   Psychiatric: He has a normal mood and affect. His behavior is normal.   Nursing note and vitals reviewed.      ED Course     ED Course     Procedures           Results for orders placed or performed during the hospital encounter of 03/02/17 (from the past 48 hour(s))   CBC with platelets differential   Result Value Ref Range    WBC 8.6 4.0 - 11.0 10e9/L    RBC Count 3.90 (L) 4.4 - 5.9 10e12/L    Hemoglobin 12.4 (L) 13.3 - 17.7 g/dL    Hematocrit 36.5 (L) 40.0 - 53.0 %    MCV 94 78 - 100 fl    MCH 31.8 26.5 - 33.0 pg    MCHC 34.0 31.5 - 36.5 g/dL    RDW 14.1 10.0 - 15.0 %    Platelet Count 208 150 - 450 10e9/L    Diff Method Automated Method     % Neutrophils 81.6 %    % Lymphocytes 8.2 %    % Monocytes 9.4 %    % Eosinophils 0.5 %    % Basophils 0.2 %    % Immature Granulocytes 0.1 %    Absolute Neutrophil 7.0 1.6 - 8.3 10e9/L    Absolute Lymphocytes 0.7 (L) 0.8 - 5.3 10e9/L    Absolute Monocytes 0.8 0.0 - 1.3 10e9/L    Absolute Eosinophils 0.0 0.0 - 0.7 10e9/L    Absolute Basophils 0.0 0.0 - 0.2 10e9/L    Abs Immature Granulocytes 0.0 0 - 0.4 10e9/L   Basic metabolic panel   Result Value Ref Range    Sodium 144 133 - 144 mmol/L    Potassium 4.0 3.4 - 5.3 mmol/L    Chloride 107 94 - 109 mmol/L    Carbon Dioxide 31 20 - 32 mmol/L    Anion Gap 6 3 - 14 mmol/L    Glucose 111 (H) 70 - 99 mg/dL    Urea Nitrogen 24 7 - 30 mg/dL    Creatinine 0.87 0.66 - 1.25 mg/dL    GFR Estimate 83 >60 mL/min/1.7m2    GFR Estimate If Black >90   GFR Calc   >60 mL/min/1.7m2     Calcium 9.0 8.5 - 10.1 mg/dL   XR Knee Bilateral 3 Views    Narrative    XR KNEE BILATERAL 3 VW 3/2/2017 11:35 AM    COMPARISON: None.    HISTORY: Fall      Impression    IMPRESSION: No fractures are seen in either knee. Joint spaces are  preserved. Likely small right knee effusion. No appreciable left knee  effusion is seen.    DELVIS DE LOS SANTOS         Critical Care time:  none                   Assessments & Plan (with Medical Decision Making)   MDM--86-year-old male with ongoing dementia who presents to the emergency room after suspected fall. He has been following with his primary care and cardiology for low blood pressure and bradycardia. Just recently saw his cardiologist. He has been taken off his lisinopril and hydrochlorothiazide. His vital signs are stable here. His wife feels comfortable taking him home and caring for him at home but is in the process of looking at longer-term care. We were able to get the patient up however he needed assistance with his dressing. He was strong enough to walk on his own however he would benefit from a walker which he refuses. Wife feels he is at his baseline and is comfortable taking him home. I suggested as she is already pursuing that he is going to be needing more care in the near future. Patient discharged in stable condition.  I have reviewed the nursing notes.    I have reviewed the findings, diagnosis, plan and need for follow up with the patient.    New Prescriptions    No medications on file       Final diagnoses:   Fall, initial encounter   Generalized muscle weakness   Dementia with behavioral disturbance, unspecified dementia type       3/2/2017   [facility] EMERGENCY DEPARTMENT     Aubrey, Hood JIN MD  03/02/17 [id number]

## 2017-03-03 NOTE — TELEPHONE ENCOUNTER
Call Type: Triage Call    Presenting Problem: Daughter Amaris calling requesting x-ray results  from ED visit on 2/27/17. Consent to comunicate signed 1/23/15.  Reviewed x-ray results negative for fracture. Patient is not present  to triage. Reviewed option to schedule follow up appointment or  bring to ED for any change or worsening symptoms. Caller stating she  will have to talk to her mother about todays scheduled appointments.    Triage Note:  Guideline Title: Information Only Call; No Symptom Triage (Adult)  Recommended Disposition: Provide Information or Advice Only  Original Inclination: Did not know what to do  Override Disposition:  Intended Action: Follow advice given  Physician Contacted: No  Requesting information regarding scheduled exam, test or procedure; no triage  required. Information provided from approved resources or clinical experience. ?  YES  Requesting regular office appointment ? NO  Sign(s) or symptom(s) associated with a diagnosed condition or with a new illness  ? NO  Requesting information about provider, services or community resources ? NO  Call back to complete assessment/clarification of information from prior caller to  complete triage ? NO  Requesting information and provider is best resource; no triage required. ? NO  Caller not with patient and is unable to provide clinical information about  patient to facilitate triage. ? NO  Requesting provider information for recently scheduled test, procedure; no triage  required. Needed information not available per approved resources or clinical  experience. ? NO  Requesting information not available per approved reference or clinical  experience; no triage required. ? NO  Physician Instructions:  Care Advice:

## 2017-03-03 NOTE — MR AVS SNAPSHOT
After Visit Summary   3/3/2017    Bobby Hanson    MRN: [id number]           Patient Information     Date Of Birth          1/31/1931        Visit Information        Provider Department      3/3/2017 10:30 AM NL RN [facility]        Today's Diagnoses     Left knee pain    -  1       Follow-ups after your visit        Your next 10 appointments already scheduled     Mar 07, 2017  9:00 AM CST   Office Visit with [facility]mero Ye DO   [facility]    [address] [facility] [id number] [phone]           Bring a current list of meds and any records pertaining to this visit.  For Physicals, please bring immunization records and any forms needing to be filled out.  Please arrive 10 minutes early to complete paperwork.              Who to contact     If you have questions or need follow up information about today's clinic visit or your schedule please contact [facility] directly at [phone].  Normal or non-critical lab and imaging results will be communicated to you by [employer], letter or phone within 4 business days after the clinic has received the results. If you do not hear from us within 7 days, please contact the clinic through [employer] or phone. If you have a critical or abnormal lab result, we will notify you by phone as soon as possible.  Submit refill requests through [employer] or call your pharmacy and they will forward the refill request to us. Please allow 3 business days for your refill to be completed.          Additional Information About Your Visit        MyChart Information     [employer] gives you secure access to your electronic health record. If you see a primary care provider, you can also send messages to your care team and make appointments. If you have questions, please call your primary care clinic.  If you do not have a primary care provider, please call [phone] and they will assist you.         Care EveryWhere ID     This is your Care EveryWhere ID. This could be used by other organizations to access your [place] medical records  [id number]         Blood Pressure from Last 3 Encounters:   03/02/17 153/81   02/27/17 90/52   02/15/17 168/64    Weight from Last 3 Encounters:   03/02/17 145 lb (65.8 kg)   02/27/17 149 lb 9.6 oz (67.9 kg)   02/15/17 146 lb 1.6 oz (66.3 kg)              Today, you had the following     No orders found for display       Primary Care Provider Office Phone # Fax #    Rao Lopez [phone]       [facility] [address] [facility] [address]        Thank you!     Thank you for choosing [facility]  for your care. Our goal is always to provide you with excellent care. Hearing back from our patients is one way we can continue to improve our services. Please take a few minutes to complete the written survey that you may receive in the mail after your visit with us. Thank you!             Your Updated Medication List - Protect others around you: Learn how to safely use, store and throw away your medicines at www.disposemymeds.org.          This list is accurate as of: 3/3/17 10:55 AM.  Always use your most recent med list.                   Brand Name Dispense Instructions for use    aspirin 81 MG tablet     100 tablet    taking 4 tabs daily       BRIMONIDINE TARTRATE OP      Apply 1 drop to eye 2 times daily. Left eye       DORZOLAMIDE HCL-TIMOLOL MAL OP      Apply 1 drop to eye 2 times daily. Both eyes       isosorbide mononitrate 30 MG 24 hr tablet    IMDUR    30 tablet    Take 1 tablet (30 mg) by mouth daily       risperiDONE 1 MG tablet    risperDAL    30 tablet    Take 1 tablet (1 mg) by mouth every evening       tamsulosin 0.4 MG capsule    FLOMAX    90 capsule    Take 1 capsule (0.4 mg) by mouth daily       UNABLE TO FIND      MEDICATION NAME: Ensure 1 bottle once daily       vitamin D 2000 UNITS tablet     100 tablet     Take 2,000 Units by mouth daily

## 2017-03-03 NOTE — PROGRESS NOTES
Patient was seen in the ER yesterday. Patient has dementia and unable to answer any questions RN asks.  Wife said she went to get him up for breakfast and patient was lying on the floor. Wife was not able to get him up so she called 911. Wife said patient was seen in the ER and had a CT scan and xrays. Nothing was found on either test.     Wife said patient continues to grab at his left knee. She said she believes he has some pain in the left knee. No redness, swelling, or warmth. Patient is able to walk on it. Patient does shuffle his feet when he walks. Wife reports that is normal for him. Patient does not seem to have any other symptoms at this time. No apparent trouble breathing, cough, blue/cold feet/toes, swelling, redness, warmth, or signs of infection.     No openings in the clinic today. RN advised wife the only option for him to get checked out before the weekend would be the ER. Wife does not think he needs to go back to the ER. She is requesting to schedule with Dr. Ye next week so he can have a follow up and a re-check. Patient is scheduled on Tuesday. Wife agrees to bring him to the ER with any worsening symptoms, unable to walk, signs of infection, redness, swelling, warmth, or severe pain. Wife agrees with the plan of care. She again said she does not want to bring him to the ER. She would like to see how he does and follow up next week.     RECOMMENDED DISPOSITION:  See in 24 hours - patient is scheduled next week per wife's request. She will call with any questions or concerns.   Will comply with recommendation: no - wife did not want to bring him back to the ER. She requested an appointment next week.    If further questions/concerns or if Sx do not improve, worsen or new Sx develop, call your PCP or [facility] as soon as possible.    NOTES:  Disposition was determined by the first positive assessment question, therefore all previous assessment questions were negative.  Informed  to check provider manual or call insurance company to assure coverage.    Guideline used: Leg pain/swelling and Knee pain/swelling/injury  Telephone Triage Protocols for Nurses, Fifth Edition, Marta Barrios RN

## 2017-03-06 NOTE — TELEPHONE ENCOUNTER
isosorbide mononitrate         Last Written Prescription Date: 1/4/17  Last Fill Quantity: 30,  # refills: 1   Last Office Visit with [facility], [facility] or [facility] prescribing provider: 2/15/17                                         Next 5 appointments (look out 90 days)     Mar 07, 2017  9:00 AM CST   Office Visit with Omero Ye DO   [facility]    [address] [facility] [id number] [phone]

## 2017-03-07 NOTE — PROGRESS NOTES
SUBJECTIVE:                                                    Bobby Hanson is a 86 year old male who presents to clinic today for the following health issues:      ED/UC Followup:    Facility:  [facility]  Date of visit: 3/2/2017  Reason for visit: Fall at home  Current Status:  Is better.     CHIEF COMPLAINT:    The patient is a pleasant 86-year-old gentleman with end-stage dementia. He is essentially noncommunicative. His wife notes that he has had increased edema in the lower extremities and has not been taking his Lasix. Additionally, he has been having more behavioral problems and is now taking the Risperdal 1 mg at bedtime which has helped. We are going to increase this to twice daily. He recently fell on the floor and required emergency services to pick him up. His wife is incapable of doing this. We had lengthy discussion regarding the need for her to find the proper estate planning  and to pursue the possibility of nursing home placement. She is aware of this and is underway.                         PAST, FAMILY,SOCIAL HISTORY:     Medical  History:   has a past medical history of Caregiver burden (8/31/2016) and Hypertension.     Surgical History:   has a past surgical history that includes APPENDECTOMY and REMOVE TONSILS/ADENOIDS,<11 Y/O.     Social History:   reports that he quit smoking about 65 years ago. He smoked 0.00 packs per day for 6.00 years. He has never used smokeless tobacco. He reports that he drinks alcohol. He reports that he does not use illicit drugs.     Family History:  family history includes Arthritis in his paternal grandmother; C.A.D. in his mother and sister; DIABETES in his sister; GASTROINTESTINAL DISEASE in his sister and sister; Prostate Cancer in his father.            MEDICATIONS  Current Outpatient Prescriptions   Medication Sig Dispense Refill     isosorbide mononitrate (IMDUR) 30 MG 24 hr tablet TAKE ONE TABLET BY MOUTH EVERY DAY 30 tablet 5      risperiDONE (RISPERDAL) 1 MG tablet Take 1 tablet (1 mg) by mouth 2 times daily 60 tablet 3     furosemide (LASIX) 20 MG tablet Take 1 tablet (20 mg) by mouth daily 60 tablet 1     UNABLE TO FIND MEDICATION NAME: Ensure 1 bottle once daily       aspirin 81 MG tablet taking 4 tabs daily 100 tablet 3     tamsulosin (FLOMAX) 0.4 MG 24 hr capsule Take 1 capsule (0.4 mg) by mouth daily 90 capsule 1     Cholecalciferol (VITAMIN D) 2000 UNITS tablet Take 2,000 Units by mouth daily 100 tablet 3     BRIMONIDINE TARTRATE OP Apply 1 drop to eye 2 times daily. Left eye       DORZOLAMIDE HCL-TIMOLOL MAL OP Apply 1 drop to eye 2 times daily. Both eyes       [DISCONTINUED] risperiDONE (RISPERDAL) 1 MG tablet Take 1 tablet (1 mg) by mouth every evening 30 tablet 0         --------------------------------------------------------------------------------------------------------------------                          REVIEW OF SYSTEMS:        Review of systems per the patient's wife is positive for increased dementia with occasional aggressive behavior                              EXAMINATION:         /62 (BP Location: Right arm, Patient Position: Chair, Cuff Size: Adult Small)  Pulse 86  Temp 97  F (36.1  C) (Tympanic)  Resp 12  Wt 148 lb (67.1 kg)  SpO2 99%  BMI 25.4 kg/m2   Constitutional: The patient appears to be in no acute distress. The patient appears to be adequately hydrated. No acute respiratory or hemodynamic distress is noted at this time.   LUNGS: clear bilaterally, airflow is brisk, no intercostal retraction or stridor is noted. No coughing is noted during visit.   HEART:  regular without rubs, clicks, gallops, or murmurs. PMI is nondisplaced. Upstrokes are brisk. S1,S2 are heard.1-2+ edema is noted in the lower extremities.   GI: Abdomen is soft, without rebound, guarding or tenderness. Bowel sounds are appropriate. No renal bruits are heard.    PSYCH: The patient appears grossly demented. He has minimal response to  verbal stimuli intensively stares off into space.                          DECISION MAKIN. Dementia with behavioral disturbance, unspecified dementia type  Increase Risperdal to twice daily  - risperiDONE (RISPERDAL) 1 MG tablet; Take 1 tablet (1 mg) by mouth 2 times daily  Dispense: 60 tablet; Refill: 3    2. Benign essential hypertension  continue current blood pressure observation    3. Atherosclerotic cerebrovascular disease  continue aspirin and Imdur    4. Localized edema  Increase Lasix to 20 mg daily  - furosemide (LASIX) 20 MG tablet; Take 1 tablet (20 mg) by mouth daily  Dispense: 60 tablet; Refill: 1                           FOLLOW UP    I have asked the patient to make an appointment for follow up with me 11-2 weeks if not markedly improved        I have carefully explained the diagnosis and treatment options with the patient. The patient has displayed an understanding of the above, and all subsequent questions were answered.         DO JONE Mohan    Portions of this note were produced using [employer]  Although every attempt at real-time proof reading has been made, occasional grammar/syntax errors may have been missed.

## 2017-03-07 NOTE — MR AVS SNAPSHOT
After Visit Summary   3/7/2017    Bobby Hanson    MRN: [id number]           Patient Information     Date Of Birth          1/31/1931        Visit Information        Provider Department      3/7/2017 9:00 AM Omero Ye DO [facility]        Today's Diagnoses     Benign essential hypertension    -  1    Dementia with behavioral disturbance, unspecified dementia type        Atherosclerotic cerebrovascular disease        Localized edema           Follow-ups after your visit        Who to contact     If you have questions or need follow up information about today's clinic visit or your schedule please contact [facility] directly at [phone].  Normal or non-critical lab and imaging results will be communicated to you by MyChart, letter or phone within 4 business days after the clinic has received the results. If you do not hear from us within 7 days, please contact the clinic through [employer] or phone. If you have a critical or abnormal lab result, we will notify you by phone as soon as possible.  Submit refill requests through [employer] or call your pharmacy and they will forward the refill request to us. Please allow 3 business days for your refill to be completed.          Additional Information About Your Visit        MyChart Information     [email or web address] gives you secure access to your electronic health record. If you see a primary care provider, you can also send messages to your care team and make appointments. If you have questions, please call your primary care clinic.  If you do not have a primary care provider, please call [phone] and they will assist you.        Care EveryWhere ID     This is your Care EveryWhere ID. This could be used by other organizations to access your [facility] medical records  [id number]        Your Vitals Were     Pulse Temperature Respirations Pulse Oximetry BMI (Body Mass Index)       86 97  F (36.1  C) (Tympanic) 12 99% 25.4  kg/m2        Blood Pressure from Last 3 Encounters:   03/07/17 124/62   03/02/17 153/81   02/27/17 90/52    Weight from Last 3 Encounters:   03/07/17 148 lb (67.1 kg)   03/02/17 145 lb (65.8 kg)   02/27/17 149 lb 9.6 oz (67.9 kg)              Today, you had the following     No orders found for display         Today's Medication Changes          These changes are accurate as of: 3/7/17 11:12 AM.  If you have any questions, ask your nurse or doctor.               Start taking these medicines.        Dose/Directions    furosemide 20 MG tablet   Commonly known as:  LASIX   Used for:  Localized edema   Started by:  Omero Ye DO        Dose:  20 mg   Take 1 tablet (20 mg) by mouth daily   Quantity:  60 tablet   Refills:  1         These medicines have changed or have updated prescriptions.        Dose/Directions    risperiDONE 1 MG tablet   Commonly known as:  risperDAL   This may have changed:  when to take this   Used for:  Dementia with behavioral disturbance, unspecified dementia type   Changed by:  Omero Ye DO        Dose:  1 mg   Take 1 tablet (1 mg) by mouth 2 times daily   Quantity:  60 tablet   Refills:  3            Where to get your medicines      These medications were sent to [facility] [address]  [address] [place] [id number]     Phone:  [phone]     furosemide 20 MG tablet    risperiDONE 1 MG tablet                Primary Care Provider Office Phone # Fax #    Rao Lopez [phone]       [facility] [address] [facility] [address]        Thank you!     Thank you for choosing [facility]  for your care. Our goal is always to provide you with excellent care. Hearing back from our patients is one way we can continue to improve our services. Please take a few minutes to complete the written survey that you may receive in the mail after your visit with us. Thank you!              Your Updated Medication List - Protect others around you: Learn how to safely use, store and throw away your medicines at www.disposemymeds.org.          This list is accurate as of: 3/7/17 11:12 AM.  Always use your most recent med list.                   Brand Name Dispense Instructions for use    aspirin 81 MG tablet     100 tablet    taking 4 tabs daily       BRIMONIDINE TARTRATE OP      Apply 1 drop to eye 2 times daily. Left eye       DORZOLAMIDE HCL-TIMOLOL MAL OP      Apply 1 drop to eye 2 times daily. Both eyes       furosemide 20 MG tablet    LASIX    60 tablet    Take 1 tablet (20 mg) by mouth daily       isosorbide mononitrate 30 MG 24 hr tablet    IMDUR    30 tablet    TAKE ONE TABLET BY MOUTH EVERY DAY       risperiDONE 1 MG tablet    risperDAL    60 tablet    Take 1 tablet (1 mg) by mouth 2 times daily       tamsulosin 0.4 MG capsule    FLOMAX    90 capsule    Take 1 capsule (0.4 mg) by mouth daily       UNABLE TO FIND      MEDICATION NAME: Ensure 1 bottle once daily       vitamin D 2000 UNITS tablet     100 tablet    Take 2,000 Units by mouth daily

## 2017-03-07 NOTE — TELEPHONE ENCOUNTER
Prescription approved per [facility] Refill Protocol.    Brigitte Barrios RN  [facility]

## 2017-03-07 NOTE — NURSING NOTE
"Chief Complaint   Patient presents with     ER F/U     3/2/2017 for a Fall       Initial /62 (BP Location: Right arm, Patient Position: Chair, Cuff Size: Adult Small)  Pulse 86  Temp 97  F (36.1  C) (Tympanic)  Resp 12  Wt 148 lb (67.1 kg)  SpO2 99%  BMI 25.4 kg/m2 Estimated body mass index is 25.4 kg/(m^2) as calculated from the following:    Height as of 3/2/17: 5' 4\" (1.626 m).    Weight as of this encounter: 148 lb (67.1 kg).  Medication Reconciliation: complete    "

## 2017-03-17 PROBLEM — A41.9 BACTERIAL SEPSIS (H): Status: ACTIVE | Noted: 2017-01-01

## 2017-03-17 PROBLEM — J69.0: Status: ACTIVE | Noted: 2017-01-01

## 2017-03-17 PROBLEM — J69.0 ASPIRATION PNEUMONIA (H): Status: ACTIVE | Noted: 2017-01-01

## 2017-03-17 PROBLEM — N39.0 URINARY TRACT INFECTION: Status: ACTIVE | Noted: 2017-01-01

## 2017-03-17 NOTE — IP AVS SNAPSHOT
"    [address] SURGICAL: [phone]                                              INTERAGENCY TRANSFER FORM - LAB / IMAGING / EKG / EMG RESULTS   3/17/2017                    Hospital Admission Date: 3/17/2017  TI GEIGER   : 1931  Sex: Male        Attending Provider: Spencer Aaron MD     Allergies:  No Known Drug Allergies    Infection:  None   Service:  HOSPITALIST    Ht:  1.626 m (5' 4\")   Wt:  68 kg (149 lb 14.6 oz)   Admission Wt:  67.1 kg (148 lb)    BMI:  25.73 kg/m 2   BSA:  1.75 m 2            Patient PCP Information     Provider PCP Type    Rao Lopez MD, MD General         Lab Results - 3 Days      Blood culture [[id number]]  Resulted: 17 06, Result status: Preliminary result    Ordering provider: Juan Starkey MD  17 1143 Resulting lab: [facility]    Specimen Information    Type Source Collected On   Blood Arm, Forearm Only, Left 17 1125          Components       Value Reference Range Flag Lab   Specimen Description Blood   [facility]   Special Requests 30   [facility]   Culture Micro No growth after 3 days   [facility]   Micro Report Status Pending   [facility]            Blood culture [[id number]]  Resulted: 17 0613, Result status: Preliminary result    Ordering provider: Juan Starkey MD  17 1143 Resulting lab: [facility]    Specimen Information    Type Source Collected On   Blood Arm, Forearm Only, Left 17 1210          Components       Value Reference Range Flag Lab   Specimen Description Blood   [facility]   Special Requests 30   [facility]   Culture Micro No growth after 3 days   [facility]   Micro Report Status Pending   [facility]            Urine Culture Aerobic Bacterial [[id number]]  Resulted: 17 1119, Result status: Final result    Ordering provider: Juan Starkey MD  17 1143 Resulting lab: [facility]    Specimen Information "    Type Source Collected On   Urine Urine catheter 03/17/17 1325          Components       Value Reference Range Flag Lab   Specimen Description Catheterized Urine   [facility]   Culture Micro --   [facility]   Result:         Canceled, Test credited  Lab accident - specimen processed incorrectly  Please collect new sample if culture still needed, DS     Micro Report Status FINAL 03/19/2017   [facility]   Result:              Basic metabolic panel [[id number]]  Resulted: 03/19/17 0700, Result status: Final result    Ordering provider: Spencer Aaron MD  03/19/17 0000 Resulting lab: [facility]    Specimen Information    Type Source Collected On   Blood  03/19/17 0636          Components       Value Reference Range Flag Lab   Sodium 144 133 - 144 mmol/L  [facility]   Potassium 3.5 3.4 - 5.3 mmol/L  [facility]   Chloride 109 94 - 109 mmol/L  [facility]   Carbon Dioxide 27 20 - 32 mmol/L  [facility]   Anion Gap 8 3 - 14 mmol/L  [facility]   Glucose 94 70 - 99 mg/dL  [facility]   Urea Nitrogen 20 7 - 30 mg/dL  [facility]   Creatinine 0.85 0.66 - 1.25 mg/dL  [facility]   GFR Estimate 85 >60 mL/min/1.7m2  [facility]   Comment:  Non  GFR Calc   GFR Estimate If Black -- >60 mL/min/1.7m2  [facility]   Result:         >90   GFR Calc     Calcium 8.5 8.5 - 10.1 mg/dL  [facility]   Result:              CBC with platelets [[id number]] (Abnormal)  Resulted: 03/19/17 0649, Result status: Final result    Ordering provider: Spencer Aaron MD  03/19/17 0000 Resulting lab: [facility]    Specimen Information    Type Source Collected On   Blood  03/19/17 0636          Components       Value Reference Range Flag Lab   WBC 10.7 4.0 - 11.0 10e9/L  [facility]   RBC Count 3.64 4.4 - 5.9 10e12/L L [facility]   Hemoglobin 11.5 13.3 - 17.7 g/dL L [facility]   Hematocrit 36.0 40.0 - 53.0 % L [facility]   MCV 99 78 - 100 fl  [facility]   MCH 31.6 26.5 - 33.0 pg  [facility]   MCHC 31.9 31.5 - 36.5 g/dL  [facility]   RDW  14.4 10.0 - 15.0 %  [facility]   Platelet Count 207 150 - 450 10e9/L  [facility]            Methicillin resistant staph aureus cult [[id number]]  Resulted: 03/18/17 2202, Result status: Final result    Ordering provider: Spencer Aaron MD  03/17/17 1708 Resulting lab: [facility]    Specimen Information    Type Source Collected On   Nose  03/17/17 1708          Components       Value Reference Range Flag Lab   Specimen Description Nasopharyngeal   [facility]   Culture Micro No MRSA isolated   [facility]   Micro Report Status FINAL 03/18/2017   [facility]            UA with Microscopic [[id number]] (Abnormal)  Resulted: 03/17/17 1352, Result status: Final result    Ordering provider: Juan Starkey MD  03/17/17 1126 Resulting lab: [facility]    Specimen Information    Type Source Collected On   Urine Urine catheter 03/17/17 1325          Components       Value Reference Range Flag Lab   Color Urine Yellow   [facility]   Appearance Urine Clear   [facility]   Glucose Urine Negative NEG mg/dL  [facility]   Bilirubin Urine Negative NEG  [facility]   Ketones Urine Negative NEG mg/dL  [facility]   Specific [place] Urine 1.020 1.003 - 1.035  [facility]   pH Urine 6.0 5.0 - 7.0 pH  [facility]   Protein Albumin Urine Negative NEG mg/dL  [facility]   Urobilinogen Urine 0.2 0.2 - 1.0 EU/dL  [facility]   Nitrite Urine Negative NEG  [facility]   Blood Urine Moderate NEG A [facility]   Leukocyte Esterase Urine Small NEG A [facility]   Source Catheterized Urine   [facility]   WBC Urine  0 - 2 /HPF A [facility]   RBC Urine 25-50 0 - 2 /HPF A [facility]   Hyaline Casts O - 2 0 - 2 /LPF  [facility]   Squamous Epithelial /LPF Urine Few FEW /LPF  [facility]   Bacteria Urine Moderate NEG /HPF A [facility]   Mucous Urine Present NEG /LPF A [facility]            Nt probnp inpatient [[id number]]  Resulted: 03/17/17 1207, Result status: Final result    Ordering provider: Juan Starkey MD  03/17/17 1126 Resulting lab: [facility]    Specimen Information    Type Source Collected On   Blood  03/17/17 1125          Components       Value Reference Range Flag Lab   N-Terminal Pro BNP Inpatient 502 0 - 1800 pg/mL  [facility]   Comment:         Reference range shown and results flagged as abnormal are suggested inpatient   cut points for confirming diagnosis if CHF in an acute setting. Establishing   a   baseline value for each individual patient is useful for follow-up. An   inpatient or emergency department NT-proPBNP <300 pg/mL effectively rules out   acute CHF, with 99% negative predictive value.  The outpatient non-acute reference range for ruling out CHF is:   0-125 pg/mL (age 18 to less than 75)   0-450 pg/mL (age 75 yrs and older)              Comprehensive metabolic panel [[id number]] (Abnormal)  Resulted: 03/17/17 1207, Result status: Final result    Ordering provider: Juan Starkey MD  03/17/17 1126 Resulting lab: [facility]    Specimen Information    Type Source Collected On   Blood  03/17/17 1125          Components       Value Reference Range Flag Lab   Sodium 144 133 - 144 mmol/L  [facility]   Potassium 3.9 3.4 - 5.3 mmol/L  [facility]   Chloride 106 94 - 109 mmol/L  [facility]   Carbon Dioxide 30 20 - 32 mmol/L  [facility]   Anion Gap 8 3 - 14 mmol/L  [facility]   Glucose 159 70 - 99 mg/dL H [facility]   Urea Nitrogen 32 7 - 30 mg/dL H [facility]   Creatinine 1.05 0.66 - 1.25 mg/dL  [facility]   GFR Estimate 67 >60 mL/min/1.7m2  [facility]   Comment:  Non  GFR Calc   GFR Estimate If Black 81 >60 mL/min/1.7m2  [facility]   Comment:  African American GFR Calc   Calcium 8.9 8.5 - 10.1 mg/dL  [facility]   Bilirubin Total 0.5 0.2 - 1.3 mg/dL  [facility]   Albumin 2.9 3.4 - 5.0 g/dL L [facility]   Protein Total 6.0 6.8 - 8.8 g/dL L [facility]   Alkaline Phosphatase 70 40 - 150 U/L  [facility]   ALT 32 0 - 70 U/L  [facility]   AST 23 0 - 45 U/L  [facility]            CBC with platelets differential [[id number]] (Abnormal)   Resulted: 03/17/17 1141, Result status: Final result    Ordering provider: Juan Starkey MD  03/17/17 1126 Resulting lab: [facility]    Specimen Information    Type Source Collected On   Blood  03/17/17 1125          Components       Value Reference Range Flag Lab   WBC 7.3 4.0 - 11.0 10e9/L  [facility]   RBC Count 3.64 4.4 - 5.9 10e12/L L [facility]   Hemoglobin 11.5 13.3 - 17.7 g/dL L [facility]   Hematocrit 35.9 40.0 - 53.0 % L [facility]   MCV 99 78 - 100 fl  [facility]   MCH 31.6 26.5 - 33.0 pg  [facility]   MCHC 32.0 31.5 - 36.5 g/dL  [facility]   RDW 14.4 10.0 - 15.0 %  [facility]   Platelet Count 211 150 - 450 10e9/L  [facility]   Diff Method Automated Method   [facility]   % Neutrophils 77.6 %  [facility]   % Lymphocytes 9.3 %  [facility]   % Monocytes 8.9 %  [facility]   % Eosinophils 3.6 %  [facility]   % Basophils 0.3 %  [facility]   % Immature Granulocytes 0.3 %  [facility]   Absolute Neutrophil 5.7 1.6 - 8.3 10e9/L  [facility]   Absolute Lymphocytes 0.7 0.8 - 5.3 10e9/L L [facility]   Absolute Monocytes 0.7 0.0 - 1.3 10e9/L  [facility]   Absolute Eosinophils 0.3 0.0 - 0.7 10e9/L  [facility]   Absolute Basophils 0.0 0.0 - 0.2 10e9/L  [facility]   Abs Immature Granulocytes 0.0 0 - 0.4 10e9/L  [facility]            Lactic acid whole blood [[id number]]  Resulted: 03/17/17 1137, Result status: Final result    Ordering provider: Juan Starkey MD  03/17/17 1126 Resulting lab: [facility]    Specimen Information    Type Source Collected On   Blood  03/17/17 1125          Components       Value Reference Range Flag Lab   Lactic Acid 1.9 0.7 - 2.1 mmol/L  [facility]            Blood gas venous [[id number]] (Abnormal)  Resulted: 03/17/17 1137, Result status: Final result    Ordering provider: Juan Starkey MD  03/17/17 1126 Resulting lab: [facility]    Specimen Information    Type Source Collected On   Blood  03/17/17 1125          Components       Value Reference Range Flag Lab   Ph  Venous 7.38 7.32 - 7.43 pH  [facility]   PCO2 Venous 51 40 - 50 mm Hg H [facility]   PO2 Venous 54 25 - 47 mm Hg H [facility]   Bicarbonate Venous 30 21 - 28 mmol/L H [facility]   Base Excess Venous 4.6 mmol/L  [facility]   Comment:  Abnormal Result, Ref range: -7.7 to 1.9   FIO2 28   [facility]            Glucose by meter [[id number]] (Abnormal)  Resulted: 03/17/17 1126, Result status: Final result    Ordering provider: Unknown, Provider  03/17/17 1124 Resulting lab: POINT OF CARE TEST, GLUCOSE    Specimen Information    Type Source Collected On     03/17/17 1124          Components       Value Reference Range Flag Lab   Glucose 167 70 - 99 mg/dL H 170            Testing Performed By     Lab - Abbreviation Name Director Address Valid Date Range    22 - [facility] Unknown [address] [place] [address] 05/08/15 1057 - Present    170 - Unknown POINT OF CARE TEST, GLUCOSE Unknown Unknown 10/31/11 1114 - Present            Unresulted Labs     None         Imaging Results - 3 Days      XR Chest Port 1 View [[id number]]  Resulted: 03/17/17 1202, Result status: Final result    Ordering provider: Juan Starkey MD  03/17/17 1126 Resulted by: Kathya Jeffers MD    Performed: 03/17/17 1142 - 03/17/17 1153 Resulting lab: RADIOLOGY RESULTS    Narrative:       XR CHEST PORT 1 VW 3/17/2017 11:53 AM     HISTORY: congestion, CHF    COMPARISON: 2/10/2017      Impression:       IMPRESSION: Cardiac size is at the upper limits of normal and  unchanged. There is aortic calcification. Probable mild pulmonary  vascular congestion. There are bibasilar infiltrates, new since the  previous examination.    KATHYA JEFFERS MD      Testing Performed By     Lab - Abbreviation Name Director Address Valid Date Range    104 - [facility] RADIOLOGY RESULTS Unknown Unknown 02/16/05 1553 - Present            Encounter-Level Documents:     There are no encounter-level documents.      Order-Level Documents:     There are  no order-level documents.

## 2017-03-17 NOTE — MR AVS SNAPSHOT
After Visit Summary   3/17/2017    Bobby Hanson    MRN: [id number]           Patient Information     Date Of Birth          1/31/1931        Visit Information        Provider Department      3/17/2017 10:30 AM JEM SMITH NURSE, [facility]        Today's Diagnoses     BP check    -  1       Follow-ups after your visit        Future tests that were ordered for you today     Open Standing Orders        Priority Remaining Interval Expires Ordered    Oxygen: Nasal cannula, Oxygen mask STAT 56136/21288 CONTINUOUS  3/17/2017            Who to contact     If you have questions or need follow up information about today's clinic visit or your schedule please contact [facility] directly at [phone].  Normal or non-critical lab and imaging results will be communicated to you by MyChart, letter or phone within 4 business days after the clinic has received the results. If you do not hear from us within 7 days, please contact the clinic through [employer] or phone. If you have a critical or abnormal lab result, we will notify you by phone as soon as possible.  Submit refill requests through [employer] or call your pharmacy and they will forward the refill request to us. Please allow 3 business days for your refill to be completed.          Additional Information About Your Visit        MyChart Information     [employer] gives you secure access to your electronic health record. If you see a primary care provider, you can also send messages to your care team and make appointments. If you have questions, please call your primary care clinic.  If you do not have a primary care provider, please call [phone] and they will assist you.        Care EveryWhere ID     This is your Care EveryWhere ID. This could be used by other organizations to access your [place] medical records  [id number]        Your Vitals Were     Pulse Temperature Pulse Oximetry             52 96.6  F (35.9  C) (Tympanic)  70%          Blood Pressure from Last 3 Encounters:   03/17/17 109/50   03/17/17 (!) 75/41   03/07/17 124/62    Weight from Last 3 Encounters:   03/17/17 148 lb (67.1 kg)   03/07/17 148 lb (67.1 kg)   03/02/17 145 lb (65.8 kg)              Today, you had the following     No orders found for display       Primary Care Provider Office Phone # Fax #    Roa Lopez [phone]       [facility] [address] [facility] [address]        Thank you!     Thank you for choosing [facility]  for your care. Our goal is always to provide you with excellent care. Hearing back from our patients is one way we can continue to improve our services. Please take a few minutes to complete the written survey that you may receive in the mail after your visit with us. Thank you!             Your Updated Medication List - Protect others around you: Learn how to safely use, store and throw away your medicines at www.disposemymeds.org.          This list is accurate as of: 3/17/17 11:18 AM.  Always use your most recent med list.                   Brand Name Dispense Instructions for use    aspirin 81 MG tablet     100 tablet    taking 4 tabs daily       BRIMONIDINE TARTRATE OP      Apply 1 drop to eye 2 times daily. Left eye       DORZOLAMIDE HCL-TIMOLOL MAL OP      Apply 1 drop to eye 2 times daily. Both eyes       furosemide 20 MG tablet    LASIX    60 tablet    Take 1 tablet (20 mg) by mouth daily       isosorbide mononitrate 30 MG 24 hr tablet    IMDUR    30 tablet    TAKE ONE TABLET BY MOUTH EVERY DAY       risperiDONE 1 MG tablet    risperDAL    60 tablet    Take 1 tablet (1 mg) by mouth 2 times daily       tamsulosin 0.4 MG capsule    FLOMAX    90 capsule    Take 1 capsule (0.4 mg) by mouth daily       UNABLE TO FIND      MEDICATION NAME: Ensure 1 bottle once daily       vitamin D 2000 UNITS tablet     100 tablet    Take 2,000 Units by mouth daily

## 2017-03-17 NOTE — ED NOTES
Pt. Is grunting with expiration and the spouse reports that this is new since he aspirated this morning

## 2017-03-17 NOTE — H&P
[facility] History and Physical    Bobby Hanson MRN# [id number]   Age: 86 year old YOB: 1931     Date of Admission:  3/17/2017    Home clinic: [facility]  Primary care provider: Rao Lopez          Assessment and Plan:   Assessment:   Active Problems:    Bacterial sepsis (H)    Assessment: likely secondary to aspiration pneumonia and UTI    Plan: will continue with fluid resuscitation and antibiotics for underlying causes.    Urinary tract infection    Assessment: culture pending.    Plan: antibiotics.    Aspiration pneumonia of right lower lobe due to regurgitated food (H)    Assessment: suspected    Plan: swallow evaluation (did have a nursing bedside eval a few months ago, will obtain one via speech therapy if possible.  Treat with antibiotics to cover aspiration organisms.    Dementia with behavioral disturbance, unspecified dementia type    Assessment: progressive.    Plan: will need additional nursing cares.  Continue current regimen.     Benign non-nodular prostatic hyperplasia with lower urinary tract symptoms    Assessment: unclear if still benefiting from Flomax    Plan: trial of holding flomax during his stay.    Benign essential hypertension    Assessment: low on admission, improved after IVF.    Plan: home meds, adjust based on his clinical status.    Atherosclerotic cerebrovascular disease    Assessment: not symptomatic.      Plan: risk factor reduction as pt and family are willing.      Plan:   -Admit to hospitalist  Admit to inpatient  -Fever control  IV fluid / PO titration  Oxygen continued  Pain management: acetominophen  Respiratory therapy  -(See orders placed for this visit)  -prophylaxis against venous thromboembolism and pressure ulcer preventative measures  -Keep NPO for now  -Speech therapy and Respiratory therapy  -Advance activity as tolerated       Admit Core Measures:  Acute MI, CHF, or stroke core measures do not apply for this  admission           Chief Complaint:   Cough, weakness    History is obtained from the electronic health record, emergency department physician and patient's spouse          History of Present Illness:   This patient is a 86 year old  male with a significant past medical history of dementia and hypertension who presents with hypotension and increasing cough.    Pt was in his usual state of health this morning, but did awake earlier than usual.  He dressed himself, and his wife found him sleeping in the recliner this morning (had been in his bed earlier during the night when she checked on him).  He coughed a bit during his breakfast this morning (which he's been doing more the past several months).  She took him to the [facility] while she had her hair done.  Upon returning to collect him, she was told that he'd been coughing, and he was kind of slumped in his chair and wouldn't get up and respond.  Another person helped her get him to the car, and she took him to the clinic to be seen.  He was noted to be hypotensive and was then sent by ambulance to the ER.      He does have a history of dementia, which has been steadily worsening of late.               Past Medical History:     Past Medical History   Diagnosis Date     Caregiver burden 8/31/2016     strongly advised placement into nursing care facility as his dementia worsens.      Hypertension              Past Surgical History:      Past Surgical History   Procedure Laterality Date     C appendectomy       Hc remove tonsils/adenoids,<11 y/o               Social History:     Social History   Substance Use Topics     Smoking status: Former Smoker     Packs/day: 0.00     Years: 6.00     Quit date: 1/1/1952     Smokeless tobacco: Never Used     Alcohol use 0.0 oz/week     0 Standard drinks or equivalent per week      Comment: rare             Family History:     Family History   Problem Relation Age of Onset     C.A.D. Mother      had rheumatic fever as  a child     C.A.D. Sister      had MI  had bi pass--another sister had heart operation.      DIABETES Sister       at age 81     Prostate Cancer Father       at age 59     Arthritis Paternal Grandmother      GASTROINTESTINAL DISEASE Sister      GASTROINTESTINAL DISEASE Sister      esophogus ulcer     Family history reviewed and updated in [facility]          Immunizations:     Immunization History   Administered Date(s) Administered     Influenza (IIV3) 10/28/1997             Allergies:     Allergies   Allergen Reactions     No Known Drug Allergies              Medications:     Prescriptions Prior to Admission   Medication Sig Dispense Refill Last Dose     isosorbide mononitrate (IMDUR) 30 MG 24 hr tablet TAKE ONE TABLET BY MOUTH EVERY DAY 30 tablet 5 3/17/2017 at 0800     risperiDONE (RISPERDAL) 1 MG tablet Take 1 tablet (1 mg) by mouth 2 times daily 60 tablet 3 3/17/2017 at 0800     furosemide (LASIX) 20 MG tablet Take 1 tablet (20 mg) by mouth daily 60 tablet 1 3/16/2017 at 1200     aspirin 81 MG tablet taking 4 tabs daily 100 tablet 3 3/17/2017 at 0800     tamsulosin (FLOMAX) 0.4 MG 24 hr capsule Take 1 capsule (0.4 mg) by mouth daily 90 capsule 1 3/16/2017 at 2000     Cholecalciferol (VITAMIN D) 2000 UNITS tablet Take 2,000 Units by mouth daily 100 tablet 3 3/17/2017 at 0800     BRIMONIDINE TARTRATE OP Apply 1 drop to eye 2 times daily. Left eye   3/16/2017 at pm     DORZOLAMIDE HCL-TIMOLOL MAL OP Apply 1 drop to eye 2 times daily. Both eyes   3/16/2017 at 0800     UNABLE TO FIND MEDICATION NAME: Ensure 1 bottle once daily   Taking             Review of Systems:   CONSTITUTIONAL:chills  E: NEGATIVE for vision changes or irritation  E/M: NEGATIVE for ear, mouth and throat problems  R: NEGATIVE for significant cough or SOB  CV: NEGATIVE for chest pain, palpitations or peripheral edema  GI: NEGATIVE for nausea, abdominal pain, heartburn, or change in bowel habits  : NEGATIVE for frequency, dysuria, or  hematuria  M: NEGATIVE for significant arthralgias or myalgia  N: NEGATIVE for weakness, dizziness or paresthesias  P: NEGATIVE for changes in mood or affect  Review of systems is limited by patient factors - age and mental status     Code Status:  DNR / DNI.  No desire for CPR, intubation, shocks.  No desire for invasive procedures, but OK with IVF, medications.  No heroics.         Physical Exam:   Vitals were reviewed  Temp: 97.3  F (36.3  C) (Unable to obtain oral temp; confusion) Temp src: Temporal BP: 109/50   Heart Rate: 62 Resp: 20 SpO2: (!) 86 % O2 Device: None (Room air)    Constitutional:   awake, alert, cooperative, no apparent distress, and appears stated age.  Not oriented, has a hard time answering questions due to dementia     Eyes:   Lids and lashes normal, pupils equal, round and reactive to light, extra ocular muscles intact, sclera clear, conjunctiva normal     ENT:   Normocephalic, without obvious abnormality, atraumatic, sinuses nontender on palpation, external ears without lesions, oral pharynx with moist mucous membranes, tonsils without erythema or exudates, gums normal and good dentition.     Neck:   Supple, symmetrical, trachea midline, no adenopathy, thyroid symmetric, not enlarged and no tenderness, skin normal     Back:   Symmetric, no curvature, spinous processes are non-tender on palpation, paraspinous muscles are non-tender on palpation, no costal vertebral tenderness     Lungs:   No increased work of breathing, good air exchange, clear to auscultation bilaterally, no crackles or wheezing     Cardiovascular:   Normal apical impulse, regular rate and rhythm, normal S1 and S2, no S3 or S4, and no murmur noted     Abdomen:   No scars, normal bowel sounds, soft, non-distended, non-tender, no masses palpated, no hepatosplenomegally     Musculoskeletal:   2+ pitting edema with poor tone in lower extremities, pt lying with little effort to move at all     Skin:   Pale, superficial  laceration on shin.             Data:     Results for orders placed or performed during the hospital encounter of 03/17/17 (from the past 24 hour(s))   Glucose by meter   Result Value Ref Range    Glucose 167 (H) 70 - 99 mg/dL   CBC with platelets differential   Result Value Ref Range    WBC 7.3 4.0 - 11.0 10e9/L    RBC Count 3.64 (L) 4.4 - 5.9 10e12/L    Hemoglobin 11.5 (L) 13.3 - 17.7 g/dL    Hematocrit 35.9 (L) 40.0 - 53.0 %    MCV 99 78 - 100 fl    MCH 31.6 26.5 - 33.0 pg    MCHC 32.0 31.5 - 36.5 g/dL    RDW 14.4 10.0 - 15.0 %    Platelet Count 211 150 - 450 10e9/L    Diff Method Automated Method     % Neutrophils 77.6 %    % Lymphocytes 9.3 %    % Monocytes 8.9 %    % Eosinophils 3.6 %    % Basophils 0.3 %    % Immature Granulocytes 0.3 %    Absolute Neutrophil 5.7 1.6 - 8.3 10e9/L    Absolute Lymphocytes 0.7 (L) 0.8 - 5.3 10e9/L    Absolute Monocytes 0.7 0.0 - 1.3 10e9/L    Absolute Eosinophils 0.3 0.0 - 0.7 10e9/L    Absolute Basophils 0.0 0.0 - 0.2 10e9/L    Abs Immature Granulocytes 0.0 0 - 0.4 10e9/L   Nt probnp inpatient   Result Value Ref Range    N-Terminal Pro BNP Inpatient 502 0 - 1800 pg/mL   Comprehensive metabolic panel   Result Value Ref Range    Sodium 144 133 - 144 mmol/L    Potassium 3.9 3.4 - 5.3 mmol/L    Chloride 106 94 - 109 mmol/L    Carbon Dioxide 30 20 - 32 mmol/L    Anion Gap 8 3 - 14 mmol/L    Glucose 159 (H) 70 - 99 mg/dL    Urea Nitrogen 32 (H) 7 - 30 mg/dL    Creatinine 1.05 0.66 - 1.25 mg/dL    GFR Estimate 67 >60 mL/min/1.7m2    GFR Estimate If Black 81 >60 mL/min/1.7m2    Calcium 8.9 8.5 - 10.1 mg/dL    Bilirubin Total 0.5 0.2 - 1.3 mg/dL    Albumin 2.9 (L) 3.4 - 5.0 g/dL    Protein Total 6.0 (L) 6.8 - 8.8 g/dL    Alkaline Phosphatase 70 40 - 150 U/L    ALT 32 0 - 70 U/L    AST 23 0 - 45 U/L   Lactic acid whole blood   Result Value Ref Range    Lactic Acid 1.9 0.7 - 2.1 mmol/L   Blood gas venous   Result Value Ref Range    Ph Venous 7.38 7.32 - 7.43 pH    PCO2 Venous 51 (H) 40 -  50 mm Hg    PO2 Venous 54 (H) 25 - 47 mm Hg    Bicarbonate Venous 30 (H) 21 - 28 mmol/L    Base Excess Venous 4.6 mmol/L    FIO2 28    XR Chest Port 1 View    Narrative    XR CHEST PORT 1 VW 3/17/2017 11:53 AM     HISTORY: congestion, CHF    COMPARISON: 2/10/2017      Impression    IMPRESSION: Cardiac size is at the upper limits of normal and  unchanged. There is aortic calcification. Probable mild pulmonary  vascular congestion. There are bibasilar infiltrates, new since the  previous examination.    ARON GRAHAM MD   UA with Microscopic   Result Value Ref Range    Color Urine Yellow     Appearance Urine Clear     Glucose Urine Negative NEG mg/dL    Bilirubin Urine Negative NEG    Ketones Urine Negative NEG mg/dL    Specific Gravity Urine 1.020 1.003 - 1.035    pH Urine 6.0 5.0 - 7.0 pH    Protein Albumin Urine Negative NEG mg/dL    Urobilinogen Urine 0.2 0.2 - 1.0 EU/dL    Nitrite Urine Negative NEG    Blood Urine Moderate (A) NEG    Leukocyte Esterase Urine Small (A) NEG    Source Catheterized Urine     WBC Urine  (A) 0 - 2 /HPF    RBC Urine 25-50 (A) 0 - 2 /HPF    Hyaline Casts O - 2 0 - 2 /LPF    Squamous Epithelial /LPF Urine Few FEW /LPF    Bacteria Urine Moderate (A) NEG /HPF    Mucous Urine Present (A) NEG /LPF      All cardiac studies reviewed by me.   All imaging studies reviewed by me.      Attestation:  I have reviewed today's vital signs, notes, medications, labs and imaging.  Amount of time performed on this history and physical: 50 minutes.     Spencer Aaron MD, MD

## 2017-03-17 NOTE — PLAN OF CARE
Problem: Goal Outcome Summary  Goal: Goal Outcome Summary  History: Patient arrived to ED with concern for aspiration. Patient's wife reports Bobby has had difficulty with swallowing liquids and solids for the past couple of months. Wife reports frequent coughing and oral pocketing. Patient has a PMH of dementia.      Unable to complete full oral mechanism due to confusion and difficulty following directions.     Patient tolerated nectar thick liquid with consecutive swallows without demonstrating s/sx of aspiration. Slow oral transit and decreased laryngeal elevation observed.  Patient tolerated puree solids by spoon without demonstrating s/sx of aspiration. Slow oral transit, holding and decreased laryngeal elevation observed.  Patient tolerated 1 bite of semisolid without demonstrating s/sx of aspiration. Immature oral prep, slow oral transit and oral residue noted.     Discussed results with patient, wife and son.     Recommend:  1.) Nectar thick liquids  2.) Puree solids  3.) Small bites/sips  4.) Upright for all PO intake  5.) No straws  6.) Oral cares after meals     Damaris Lopez MA, CCC-SLP

## 2017-03-17 NOTE — IP AVS SNAPSHOT
"` `           [address] MEDICAL SURGICAL: [phone]                                              INTERAGENCY TRANSFER FORM - NURSING   3/17/2017                    Hospital Admission Date: 3/17/2017  TI GEIGER   : 1931  Sex: Male        Attending Provider: Spencer Aaron MD     Allergies:  No Known Drug Allergies    Infection:  None   Service:  HOSPITALIST    Ht:  1.626 m (5' 4\")   Wt:  68 kg (149 lb 14.6 oz)   Admission Wt:  67.1 kg (148 lb)    BMI:  25.73 kg/m 2   BSA:  1.75 m 2            Patient PCP Information     Provider PCP Type    Rao Lopez MD, MD General      Current Code Status     Date Active Code Status Order ID Comments User Context       Prior      Code Status History     Date Active Date Inactive Code Status Order ID Comments User Context    3/20/2017 11:59 AM  DNR/DNI [id number]  Kurtis Stoner MD Outpatient    3/17/2017  3:00 PM 3/20/2017 11:59 AM DNR/DNI [id number]  Spencer Aaron MD Inpatient    2017  1:03 PM 3/17/2017  3:00 PM DNR/DNI [id number]  Kurtis Stoner MD Outpatient    2/10/2017 10:30 PM 2017  1:03 PM DNR/DNI [id number]  Spencer Parikh MD Inpatient      Advance Directives        Does patient have a scanned Advance Directive/ACP document in EPIC?           No        Hospital Problems as of 3/20/2017              Priority Class Noted POA    Dementia with behavioral disturbance, unspecified dementia type Medium  2016 Yes    Benign non-nodular prostatic hyperplasia with lower urinary tract symptoms Medium  2016 Yes    Benign essential hypertension Medium  2016 Yes    Atherosclerotic cerebrovascular disease Medium  2017 Yes    Bacterial sepsis (H) Medium  3/17/2017 Yes    Pyuria Medium  3/17/2017 Yes    * (Principal)Aspiration pneumonia (H) Medium  3/17/2017 Yes      Non-Hospital Problems as of 3/20/2017              Priority Class Noted    CARDIOVASCULAR SCREENING; LDL GOAL LESS THAN 130   10/31/2010 "    Advanced directives, counseling/discussion   10/3/2012    Health Care Home   3/19/2014    Urgency incontinence Medium  4/13/2016    Caregiver burden Medium  8/31/2016    Syncope Medium  2/10/2017    Hypotension, unspecified hypotension type Medium  2/10/2017    Benign prostatic hyperplasia with lower urinary tract symptoms Medium  2/10/2017    Bradycardia Medium  2/10/2017    Elevated troponin Medium  2/10/2017    Acute kidney injury (H) Medium  2/10/2017    Microscopic hematuria Medium  2/10/2017    Occlusion of left vertebral artery Medium  2/11/2017      Immunizations     Name Date      Influenza (IIV3) 10/28/97          END      ASSESSMENT     Discharge Profile Flowsheet     EXPECTED DISCHARGE     Communication  0-->understands/communicates without difficulty 03/20/17 1038    Expected Discharge Date  03/20/17 03/19/17 1237   Swallowing  2-->difficulty swallowing liquids/foods 03/20/17 1038    DISCHARGE NEEDS ASSESSMENT     GASTROINTESTINAL (ADULT,PEDIATRIC,OB)      Patient/family verbalizes understanding of discharge plan recommendations?  Yes 03/20/17 1233   GI WDL  WDL 03/20/17 1038    Medical Team notified of plan?  yes 03/20/17 1233   COMMUNICATION ASSESSMENT      # of Referrals Placed by CTS  Post Acute Facilities 03/19/17 1237   Patient's communication style  spoken language (English or Bilingual) 03/17/17 1118    FUNCTIONAL LEVEL CURRENT     SKIN      Ambulation  3-->assistive equipment and person 03/20/17 1038   Inspection  Full 03/20/17 1038    Transferring  3-->assistive equipment and person 03/20/17 1038   Skin [facility] 03/20/17 1038    Toileting  2-->assistive person 03/20/17 1038   Skin Color/Characteristics  -- (reddened.) 03/17/17 1606    Bathing  2-->assistive person 03/20/17 1038   Skin Integrity  scab(s);excoriation (bilateral knees) 03/20/17 0051    Dressing  2-->assistive person 03/20/17 1038   SAFETY      Eating  2-->assistive person 03/20/17 1038   Safety WDL  [facility] 03/20/17 1038           "       Assessment WDL (Within Defined Limits) Definitions           Safety WDL     Effective: 09/28/15    Row Information: <b>WDL Definition:</b> Bed in low position, wheels locked; call light in reach; upper side rails up x 2; ID band on<br> <font color=\"gray\"><i>Item=AS safety wdl>>List=AS safety wdl>>Version=F14</i></font>      Skin WDL     Effective: 09/28/15    Row Information: <b>WDL Definition:</b> Warm; dry; intact; elastic; without discoloration; pressure points without redness<br> <font color=\"gray\"><i>Item=AS skin wdl>>List=AS skin wdl>>Version=F14</i></font>      Vitals     Vital Signs Flowsheet     VITAL SIGNS     Height Method  Stated 03/17/17 1128    Temp  97  F (36.1  C) 03/20/17 0734   Weight  68 kg (149 lb 14.6 oz) 03/17/17 1653    Temp src  Oral 03/20/17 0734   BSA (Calculated - sq m)  1.75 03/17/17 1653    Resp  20 03/20/17 0734   BMI (Calculated)  25.79 03/17/17 1653    Pulse  69 03/20/17 0734   HARI COMA SCALE      Heart Rate  74 03/19/17 2323   Best Eye Response  3-->(E3) to speech 03/17/17 1555    Pulse/Heart Rate Source  Monitor 03/20/17 0734   Best Motor Response  5-->(M5) localizes pain 03/17/17 1555    BP  130/74 03/20/17 0734   Best Verbal Response  4-->(V4) confused 03/17/17 1555    BP Location  Right arm 03/20/17 0734   [place] Coma Scale Score  12 03/17/17 1555    OXYGEN THERAPY     POSITIONING      SpO2  90 % 03/20/17 0734   Body Position  right, side-lying 03/20/17 1019    O2 Device  None (Room air) 03/20/17 0734   Head of Bed (HOB)  HOB at 30 degrees 03/20/17 0051    PAIN/COMFORT     DAILY CARE      Patient Currently in Pain  denies 03/19/17 1545   Activity Type  ambulated in [place] 03/20/17 1019    HEIGHT AND WEIGHT     Activity Level of Assistance  assistance, 2 people 03/20/17 1019    Height  1.626 m (5' 4\") 03/17/17 1653   Activity Assistive Device  gait belt;walker 03/20/17 1019            Patient Lines/Drains/Airways Status    Active LINES/DRAINS/AIRWAYS     Name: Placement " date: Placement time: Site: Days: Last dressing change:    Peripheral IV 03/19/17 Left Lower forearm 03/19/17   0643   Lower forearm   1             Patient Lines/Drains/Airways Status    Active PICC/CVC     None            Intake/Output Detail Report     Date Intake     Output Net    Shift P.O. I.V. IV Piggyback Total Urine Total       Noc 03/18/17 2300 - 03/19/17 0659 20 -- -- 20 -- -- 20    Day 03/19/17 0700 - 03/19/17 1459 540 -- -- 540 -- -- 540    Olga 03/19/17 1500 - 03/19/17 2259 -- -- -- -- -- -- 0    Noc 03/19/17 2300 - 03/20/17 0659 150 200 -- 350 -- -- 350    Day 03/20/17 0700 - 03/20/17 1459 -- -- -- -- -- -- 0      Last Void/BM       Most Recent Value    Urine Occurrence 1 at 03/20/2017 1000    Stool Occurrence 1 at 03/19/2017 1230      Case Management/Discharge Planning     Case Management/Discharge Planning Flowsheet     REFERRAL INFORMATION     Support Assessment  Adequate family and caregiver support 03/19/17 1237    Admission Type  inpatient 03/19/17 1237   COPING/STRESS      Arrived From  home or self-care 03/19/17 1237   Major Change/Loss/Stressor  none 03/17/17 1502    Referral Source  physician;interdisciplinary rounds 03/19/17 1237   Sources Of Support  spouse;adult child(lizbeth) 03/19/17 1237    # of Referrals Placed by CTS  Post Acute Facilities 03/19/17 1237   COPING/STRESS CAREGIVER      Reason For Consult  discharge planning 03/19/17 1237   Major Change/Loss/Stressor  role changes/responsibilities 03/19/17 1237     Assigned to Case  Shea 03/20/17 1015   Primary Caregiver Strengths  assertive;expressive of needs;expressive of emotions 03/19/17 1237    Primary Care Clinic Name  [facility] 03/19/17 1237   Sources Of Support  adult child(lizbeth) 03/19/17 1237    Primary Care MD Name  John 03/19/17 1237   Reaction To Health Status  accepting 03/19/17 1237    LIVING ENVIRONMENT     Understanding Of Condition And Treatment  needs additional information 03/19/17 1237    Lives With   spouse 03/19/17 1237   EXPECTED DISCHARGE      Living Arrangements  house 03/19/17 1237   Expected Discharge Date  03/20/17 03/19/17 1237    Provides Primary Care For  no one, unable/limited ability to care for self 03/19/17 1237   DISCHARGE PLANNING      Primary Care Provided By  spouse/significant other 03/19/17 1237   Patient/family verbalizes understanding of discharge plan recommendations?  Yes 03/20/17 1233    Caregiving Concerns  -- (too much care for wife) 03/19/17 1237   Medical Team notified of plan?  yes 03/20/17 1233    Quality Of Family Relationships  supportive;helpful;involved 03/19/17 1237   ABUSE RISK SCREEN      Able to Return to Prior Living Arrangements  no 03/19/17 1237   QUESTION TO PATIENT:  Has a member of your family or a partner(now or in the past) intimidated, hurt, manipulated, or controlled you in any way?  no 03/17/17 1500    ASSESSMENT OF FAMILY/SOCIAL SUPPORT     QUESTION TO PATIENT: Do you feel safe going back to the place where you are living?  yes 03/17/17 1500    Marital Status   03/19/17 1237   OBSERVATION: Is there reason to believe there has been maltreatment of a vulnerable adult (ie. Physical/Sexual/Emotional abuse, self neglect, lack of adequate food, shelter, medical care, or financial exploitation)?  no 03/17/17 1500    Who is your support system?  Wife;Children 03/19/17 1237   (R) MENTAL HEALTH SUICIDE RISK      Spouse's Name  Ximena 03/19/17 1237   Are you depressed or being treated for depression?  No 03/17/17 2214    Description of Support System  Involved;Supportive 03/19/17 1237

## 2017-03-17 NOTE — IP AVS SNAPSHOT
` `           [address] MEDICAL SURGICAL: [phone]                 INTERAGENCY TRANSFER FORM - NOTES (H&P, Discharge Summary, Consults, Procedures, Therapies)   3/17/2017                    Hospital Admission Date: 3/17/2017  BOBBY HANSON   : 1931  Sex: Male        Patient PCP Information     Provider PCP Type    Rao Lopez MD, MD General         History & Physicals      H&P by Spencer Aaron MD at 3/17/2017  1:53 PM     Author:  Spencer Aaron MD Service:  Hospitalist Author Type:  Physician    Filed:  3/17/2017  2:53 PM Date of Service:  3/17/2017  1:53 PM Note Created:  3/17/2017  1:44 PM    Status:  Signed :  Spencer Aaron MD (Physician)         [facility] History and Physical    Bobby Hanson MRN# [id number]   Age: 86 year old YOB: 1931     Date of Admission:  3/17/2017    Home clinic: [facility]  Primary care provider: Rao Lopez[DJ1.1]          Assessment and Plan:   Assessment:   Active Problems:    Bacterial sepsis (H)    Assessment: likely secondary to aspiration pneumonia and UTI    Plan: will continue with fluid resuscitation and antibiotics for underlying causes.    Urinary tract infection    Assessment: culture pending.    Plan: antibiotics.    Aspiration pneumonia of right lower lobe due to regurgitated food (H)    Assessment: suspected    Plan: swallow evaluation (did have a nursing bedside eval a few months ago, will obtain one via speech therapy if possible.  Treat with antibiotics to cover aspiration organisms.    Dementia with behavioral disturbance, unspecified dementia type    Assessment: progressive.    Plan: will need additional nursing cares.  Continue current regimen.     Benign non-nodular prostatic hyperplasia with lower urinary tract symptoms    Assessment: unclear if still benefiting from Flomax    Plan: trial of holding flomax during his stay.    Benign essential  hypertension    Assessment: low on admission, improved after IVF.    Plan: home meds, adjust based on his clinical status.    Atherosclerotic cerebrovascular disease    Assessment: not symptomatic.      Plan: risk factor reduction as pt and family are willing.      Plan:   -Admit to hospitalist  Admit to inpatient  -Fever control  IV fluid / PO titration  Oxygen continued  Pain management: acetominophen  Respiratory therapy  -(See orders placed for this visit)  -prophylaxis against venous thromboembolism and pressure ulcer preventative measures  -Keep NPO for now  -Speech therapy and Respiratory therapy  -Advance activity as tolerated       A[DJ1.2]dmit Core Measures:[DJ1.1]  Acute MI, CHF, or stroke core measures do not apply for this admission[DJ1.2]           Chief Complaint:[DJ1.1]   Cough, weakness    History is obtained from the electronic health record, emergency department physician and patient's spouse[DJ1.3]          History of Present Illness:   This patient is a 86 year old  male with a significant past medical history of[DJ1.1] dementia and hypertension[DJ1.4] who presents with[DJ1.1] hypotension and increasing cough.    Pt was in his usual state of health this morning, but did awake earlier than usual.  He dressed himself, and his wife found him sleeping in the recliner this morning (had been in his bed earlier during the night when she checked on him).  He coughed a bit during his breakfast this morning (which he's been doing more the past several months).  She took him to the [facility] while she had her hair done.  Upon returning to collect him, she was told that he'd been coughing, and he was kind of slumped in his chair and wouldn't get up and respond.  Another person helped her get him to the car, and she took him to the clinic to be seen.  He was noted to be hypotensive and was then sent by ambulance to the ER.      He does have a history of dementia, which has been steadily  worsening of late.[DJ1.3]               Past Medical History:     Past Medical History   Diagnosis Date     Caregiver burden 2016     strongly advised placement into nursing care facility as his dementia worsens.      Hypertension[DJ1.4]              Past Surgical History:[DJ1.1]      Past Surgical History   Procedure Laterality Date     C appendectomy       Hc remove tonsils/adenoids,<13 y/o[DJ1.4]               Social History:[DJ1.1]     Social History   Substance Use Topics     Smoking status: Former Smoker     Packs/day: 0.00     Years: 6.00     Quit date: 1952     Smokeless tobacco: Never Used     Alcohol use 0.0 oz/week     0 Standard drinks or equivalent per week      Comment: rare[DJ1.4]             Family History:[DJ1.1]     Family History   Problem Relation Age of Onset     C.A.D. Mother      had rheumatic fever as a child     C.A.D. Sister      had MI  had bi pass--another sister had heart operation.      DIABETES Sister       at age 81     Prostate Cancer Father       at age 59     Arthritis Paternal Grandmother      GASTROINTESTINAL DISEASE Sister      GASTROINTESTINAL DISEASE Sister      esophogus ulcer[DJ1.4]     Family history[DJ1.1] reviewed and updated in EPIC[DJ1.5]          Immunizations:[DJ1.1]     Immunization History   Administered Date(s) Administered     Influenza (IIV3) 10/28/1997[DJ1.4]             Allergies:[DJ1.1]     Allergies   Allergen Reactions     No Known Drug Allergies[DJ1.4]              Medications:[DJ1.1]     Prescriptions Prior to Admission   Medication Sig Dispense Refill Last Dose     isosorbide mononitrate (IMDUR) 30 MG 24 hr tablet TAKE ONE TABLET BY MOUTH EVERY DAY 30 tablet 5 3/17/2017 at 0800     risperiDONE (RISPERDAL) 1 MG tablet Take 1 tablet (1 mg) by mouth 2 times daily 60 tablet 3 3/17/2017 at 0800     furosemide (LASIX) 20 MG tablet Take 1 tablet (20 mg) by mouth daily 60 tablet 1 3/16/2017 at 1200     aspirin 81 MG tablet taking 4 tabs daily  100 tablet 3 3/17/2017 at 0800     tamsulosin (FLOMAX) 0.4 MG 24 hr capsule Take 1 capsule (0.4 mg) by mouth daily 90 capsule 1 3/16/2017 at 2000     Cholecalciferol (VITAMIN D) 2000 UNITS tablet Take 2,000 Units by mouth daily 100 tablet 3 3/17/2017 at 0800     BRIMONIDINE TARTRATE OP Apply 1 drop to eye 2 times daily. Left eye   3/16/2017 at pm     DORZOLAMIDE HCL-TIMOLOL MAL OP Apply 1 drop to eye 2 times daily. Both eyes   3/16/2017 at 0800     UNABLE TO FIND MEDICATION NAME: Ensure 1 bottle once daily   Taking[DJ1.5]             Review of Systems:[DJ1.1]   CONSTITUTIONAL:chills  E: NEGATIVE for vision changes or irritation  E/M: NEGATIVE for ear, mouth and throat problems  R: NEGATIVE for significant cough or SOB  CV: NEGATIVE for chest pain, palpitations or peripheral edema  GI: NEGATIVE for nausea, abdominal pain, heartburn, or change in bowel habits  : NEGATIVE for frequency, dysuria, or hematuria  M: NEGATIVE for significant arthralgias or myalgia  N: NEGATIVE for weakness, dizziness or paresthesias  P: NEGATIVE for changes in mood or affect  Review of systems is limited by patient factors - age and mental status[DJ1.5]     Code Status:[DJ1.1]  DNR / DNI[DJ1.4].  No desire for CPR, intubation, shocks.  No desire for invasive procedures, but OK with IVF, medications.  No heroics.[DJ1.5]         Physical Exam:   Vitals were reviewed[DJ1.4]  Temp: 97.3  F (36.3  C) (Unable to obtain oral temp; confusion) Temp src: Temporal BP: 109/50   Heart Rate: 62 Resp: 20 SpO2: (!) 86 % O2 Device: None (Room air)[DJ1.6]    Constitutional:   awake, alert, cooperative, no apparent distress, and appears stated age.  Not oriented, has a hard time answering questions due to dementia     Eyes:   Lids and lashes normal, pupils equal, round and reactive to light, extra ocular muscles intact, sclera clear, conjunctiva normal     ENT:   Normocephalic, without obvious abnormality, atraumatic, sinuses nontender on palpation,  external ears without lesions, oral pharynx with moist mucous membranes, tonsils without erythema or exudates, gums normal and good dentition.     Neck:   Supple, symmetrical, trachea midline, no adenopathy, thyroid symmetric, not enlarged and no tenderness, skin normal     Back:   Symmetric, no curvature, spinous processes are non-tender on palpation, paraspinous muscles are non-tender on palpation, no costal vertebral tenderness     Lungs:   No increased work of breathing, good air exchange, clear to auscultation bilaterally, no crackles or wheezing     Cardiovascular:   Normal apical impulse, regular rate and rhythm, normal S1 and S2, no S3 or S4, and no murmur noted     Abdomen:   No scars, normal bowel sounds, soft, non-distended, non-tender, no masses palpated, no hepatosplenomegally     Musculoskeletal:   2+ pitting edema with poor tone in lower extremities, pt lying with little effort to move at all     Skin:   Pale, superficial laceration on shin.[DJ1.4]             Data:[DJ1.1]     Results for orders placed or performed during the hospital encounter of 03/17/17 (from the past 24 hour(s))   Glucose by meter   Result Value Ref Range    Glucose 167 (H) 70 - 99 mg/dL   CBC with platelets differential   Result Value Ref Range    WBC 7.3 4.0 - 11.0 10e9/L    RBC Count 3.64 (L) 4.4 - 5.9 10e12/L    Hemoglobin 11.5 (L) 13.3 - 17.7 g/dL    Hematocrit 35.9 (L) 40.0 - 53.0 %    MCV 99 78 - 100 fl    MCH 31.6 26.5 - 33.0 pg    MCHC 32.0 31.5 - 36.5 g/dL    RDW 14.4 10.0 - 15.0 %    Platelet Count 211 150 - 450 10e9/L    Diff Method Automated Method     % Neutrophils 77.6 %    % Lymphocytes 9.3 %    % Monocytes 8.9 %    % Eosinophils 3.6 %    % Basophils 0.3 %    % Immature Granulocytes 0.3 %    Absolute Neutrophil 5.7 1.6 - 8.3 10e9/L    Absolute Lymphocytes 0.7 (L) 0.8 - 5.3 10e9/L    Absolute Monocytes 0.7 0.0 - 1.3 10e9/L    Absolute Eosinophils 0.3 0.0 - 0.7 10e9/L    Absolute Basophils 0.0 0.0 - 0.2 10e9/L    Abs  Immature Granulocytes 0.0 0 - 0.4 10e9/L   Nt probnp inpatient   Result Value Ref Range    N-Terminal Pro BNP Inpatient 502 0 - 1800 pg/mL   Comprehensive metabolic panel   Result Value Ref Range    Sodium 144 133 - 144 mmol/L    Potassium 3.9 3.4 - 5.3 mmol/L    Chloride 106 94 - 109 mmol/L    Carbon Dioxide 30 20 - 32 mmol/L    Anion Gap 8 3 - 14 mmol/L    Glucose 159 (H) 70 - 99 mg/dL    Urea Nitrogen 32 (H) 7 - 30 mg/dL    Creatinine 1.05 0.66 - 1.25 mg/dL    GFR Estimate 67 >60 mL/min/1.7m2    GFR Estimate If Black 81 >60 mL/min/1.7m2    Calcium 8.9 8.5 - 10.1 mg/dL    Bilirubin Total 0.5 0.2 - 1.3 mg/dL    Albumin 2.9 (L) 3.4 - 5.0 g/dL    Protein Total 6.0 (L) 6.8 - 8.8 g/dL    Alkaline Phosphatase 70 40 - 150 U/L    ALT 32 0 - 70 U/L    AST 23 0 - 45 U/L   Lactic acid whole blood   Result Value Ref Range    Lactic Acid 1.9 0.7 - 2.1 mmol/L   Blood gas venous   Result Value Ref Range    Ph Venous 7.38 7.32 - 7.43 pH    PCO2 Venous 51 (H) 40 - 50 mm Hg    PO2 Venous 54 (H) 25 - 47 mm Hg    Bicarbonate Venous 30 (H) 21 - 28 mmol/L    Base Excess Venous 4.6 mmol/L    FIO2 28    XR Chest Port 1 View    Narrative    XR CHEST PORT 1 VW 3/17/2017 11:53 AM     HISTORY: congestion, CHF    COMPARISON: 2/10/2017      Impression    IMPRESSION: Cardiac size is at the upper limits of normal and  unchanged. There is aortic calcification. Probable mild pulmonary  vascular congestion. There are bibasilar infiltrates, new since the  previous examination.    ARON GRAHAM MD   UA with Microscopic   Result Value Ref Range    Color Urine Yellow     Appearance Urine Clear     Glucose Urine Negative NEG mg/dL    Bilirubin Urine Negative NEG    Ketones Urine Negative NEG mg/dL    Specific Gravity Urine 1.020 1.003 - 1.035    pH Urine 6.0 5.0 - 7.0 pH    Protein Albumin Urine Negative NEG mg/dL    Urobilinogen Urine 0.2 0.2 - 1.0 EU/dL    Nitrite Urine Negative NEG    Blood Urine Moderate (A) NEG    Leukocyte Esterase Urine Small  (A) NEG    Source Catheterized Urine     WBC Urine  (A) 0 - 2 /HPF    RBC Urine 25-50 (A) 0 - 2 /HPF    Hyaline Casts O - 2 0 - 2 /LPF    Squamous Epithelial /LPF Urine Few FEW /LPF    Bacteria Urine Moderate (A) NEG /HPF    Mucous Urine Present (A) NEG /LPF[DJ1.7]      All cardiac studies reviewed by me.   All imaging studies reviewed by me.[DJ1.4]      Attestation:[DJ1.1]  I have reviewed today's vital signs, notes, medications, labs and imaging.  Amount of time performed on this history and physical:[DJ1.4] 50[DJ1.5] minutes.[DJ1.4]     Spencer Aaron MD, MD[DJ1.8]         Revision History        User Key Date/Time User Provider Type Action    > DJ1.2 3/17/2017  2:53 PM Spencer Aaron MD Physician Sign     DJ1.5 3/17/2017  2:47 PM Spencer Aaron MD Physician      DJ1.3 3/17/2017  2:43 PM Spencer Aaron MD Physician      DJ1.7 3/17/2017  1:57 PM Spencer Aaron MD Physician      DJ1.6 3/17/2017  1:55 PM Spencer Aaron MD Physician      DJ1.4 3/17/2017  1:54 PM Spencer Aaron MD Physician      DJ1.8 3/17/2017  1:53 PM Spencer Aaron MD Physician      DJ1.1 3/17/2017  1:44 PM Spencer Aaron MD Physician                   Discharge Summaries     No notes of this type exist for this encounter.         Consult Notes      Consults by Krysta Mathur at 3/19/2017 12:41 PM     Author:  Krysta Mathur Service:  (none) Author Type:      Filed:  3/19/2017 12:56 PM Date of Service:  3/19/2017 12:41 PM Note Created:  3/19/2017 12:38 PM    Status:  Addendum :  Krysta Mathur ()             Reason for Referral: D/C Planning    Presenting Problem: Aspiration Pneumonia    Cognitive Behavioral Status: confused    Support system: Wife/Children    Current Living Situation:   Home Setting: unknown   Living Situation: Spouse   Function Status / Assistive Device: wheeled walker    Employment/ Financial/ Insurance Concerns:  retired          No Insurance issues identified      Housing Concerns: No    Assessment: Patient lives at home with his wife.  His wife has been his caregiver.  Patient's cares with severe Dementia has become too much for the wife to handle.  He will need LTC placement.  He may be able to get some rehab services in the LTC unit when he discharges.  Family would prefer [facility] so he can stay close to his wife.  Other options would be [facility], [facility], and [facility].  Referral sent to [facility] (Admissions: [phone] Main Phone: [phone] Fax: [phone]).  They will be able to assess on Monday.[JK1.1]    Patient currently has [facility] - RN - Silvina - [phone] - she would like to be updated on when and where patient will discharge.  She states she has been working with the wife on getting him placed for a while but they have property and she doesn't want to spend the money.[JK1.2]    Plan: long term care facility - referral pending    Krysta Mathur [facility] [phone]/ [facility] [phone][JK1.1]                         Revision History        User Key Date/Time User Provider Type Action    > JK1.2 3/19/2017 12:56 PM Krysta Mathur  Addend     JK1.1 3/19/2017 12:41 PM Krysta Mathur  Sign                     Progress Notes - Physician (Notes from 03/17/17 through 03/20/17)      Progress Notes by Spencer Aaron MD at 3/19/2017  1:53 PM     Author:  Spencer Aaron MD Service:  Hospitalist Author Type:  Physician    Filed:  3/19/2017  1:59 PM Date of Service:  3/19/2017  1:53 PM Note Created:  3/19/2017  1:53 PM    Status:  Signed :  Spencer Aaron MD (Physician)         [facility]    Hospitalist Progress Note    Date of Service (when I saw the patient): 03/19/2017    Assessment & Plan   Bobby Hanson is a 86 year old male who was admitted on 3/17/2017. His aspiration pneumonia and urinary infection are clinically  improving.  Unfortunately, urine culture is not available to guide further treatment due to lab error (unclear what this was).  His dementia is stable. Sepsis has resolved.[DJ1.1]    Active Problems:    Bacterial sepsis (H)    Urinary tract infection    Aspiration pneumonia of right lower lobe due to regurgitated food (H)    Dementia with behavioral disturbance, unspecified dementia type    Benign non-nodular prostatic hyperplasia with lower urinary tract symptoms    Benign essential hypertension    Aspiration pneumonia (H)    Atherosclerotic cerebrovascular disease[DJ1.2]    Consider obtaining another urine culture to guide antibiotic therapy (but likely won't grow anything as pt has now been on antibiotics for a few days).  When able to d/c, will change to Augmentin to complete 7-10 day course of treatment.      DVT Prophylaxis: Pneumatic Compression Devices  Code Status: DNR/DNI    Disposition: Expected discharge tomorrow if placement can be arranged.    Spencer Aaron MD    Interval History   Pt without complaints today.  Appears more alert.  No new issues reported by family or nursing staff.    -Data reviewed today: I reviewed all new labs and imaging results over the last 24 hours. I personally reviewed no images or EKG's today.    Physical Exam   Temp: 98.4  F (36.9  C) Temp src: Oral BP: 151/72 Pulse: 60 Heart Rate: 60 Resp: 20 SpO2: 90 % O2 Device: None (Room air)    Vitals:    03/17/17 1126 03/17/17 1600   Weight: 148 lb (67.1 kg) 149 lb 14.6 oz (68 kg)     Vital Signs with Ranges  Temp:  [97.6  F (36.4  C)-99.7  F (37.6  C)] 98.4  F (36.9  C)  Pulse:  [60-72] 60  Heart Rate:  [60-72] 60  Resp:  [18-20] 20  BP: (146-170)/(64-81) 151/72  SpO2:  [90 %-93 %] 90 %  I/O last 3 completed shifts:  In: 880 [P.O.:680; I.V.:200]  Out: 200 [Urine:200]    Constitutional: WDWN, NAD, confused easily  Respiratory: Upper airway sounds, no focal findings.  Cardiovascular: RRR, no MRG  GI: benign  Skin/Integumen:  benign  Neuro: demented, not able to answer anything clearly that requires more than a simple yes or no.  Other:     Medications        ampicillin-sulbactam (UNASYN) IV  3 g Intravenous Q6H     sodium chloride (PF)  3 mL Intracatheter Q8H     aspirin  162 mg Oral Daily     brimonidine  1 drop Left Eye BID     dorzolamide-timolol  1 drop Both Eyes BID     furosemide  20 mg Oral Daily     isosorbide mononitrate  30 mg Oral Daily     risperiDONE  1 mg Oral BID       Data   Data reviewed today:  I personally reviewed his labs.    Recent Labs  Lab 03/19/17  0636 03/17/17  1125   WBC 10.7 7.3   HGB 11.5* 11.5*   MCV 99 99    211    144   POTASSIUM 3.5 3.9   CHLORIDE 109 106   CO2 27 30   BUN 20 32*   CR 0.85 1.05   ANIONGAP 8 8   NIYA 8.5 8.9   GLC 94 159*   ALBUMIN  --  2.9*   PROTTOTAL  --  6.0*   BILITOTAL  --  0.5   ALKPHOS  --  70   ALT  --  32   AST  --  23       No results found for this or any previous visit (from the past 24 hour(s)).[DJ1.1]      Revision History        User Key Date/Time User Provider Type Action    > DJ1.2 3/19/2017  1:59 PM Spencer Aaron MD Physician Sign     DJ1.1 3/19/2017  1:53 PM Spencer Aaron MD Physician             Progress Notes by Spencer Aaron MD at 3/18/2017  3:50 PM     Author:  Spencer Aaron MD Service:  Hospitalist Author Type:  Physician    Filed:  3/18/2017  3:52 PM Date of Service:  3/18/2017  3:50 PM Note Created:  3/18/2017  3:50 PM    Status:  Signed :  Spencer Aarno MD (Physician)         [facility]    Hospitalist Progress Note    Date of Service (when I saw the patient): 03/18/2017    Assessment & Plan   Bobby Hanson is a 86 year old male who was admitted on 3/17/2017 with aspiration pneumonia and UTI.  Clinically improving on Unasyn, but cultures negative thus far.  Dementia has progressed to the point that wife doesn't feel she can safely care for him at home.  POLST filled  out.[DJ1.1]      Active Problems:    Bacterial sepsis (H)    Urinary tract infection    Aspiration pneumonia of right lower lobe due to regurgitated food (H)    Dementia with behavioral disturbance, unspecified dementia type    Benign non-nodular prostatic hyperplasia with lower urinary tract symptoms    Benign essential hypertension    Aspiration pneumonia (H)    Atherosclerotic cerebrovascular disease[DJ1.2]    DVT Prophylaxis: Pneumatic Compression Devices  Code Status: DNR/DNI    Disposition: Expected discharge in 1-2 days once cultures indicate appropriate antibiotics and safe placement can be arranged.    Spencer Aaron MD    Interval History   Pt improving by clinical criteria.  Not requiring oxygen.  Vitals otherwise stable.  He was alter this morning, but very sleepy this afternoon.  No complaints, cultures still negative.    -Data reviewed today: I reviewed all new labs and imaging results over the last 24 hours. I personally reviewed no images or EKG's today.    Physical Exam   Temp: 97.6  F (36.4  C) Temp src: Axillary BP: 146/64 Pulse: 60 Heart Rate: 60 Resp: 18 SpO2: 92 % O2 Device: None (Room air)    Vitals:    03/17/17 1126 03/17/17 1600   Weight: 148 lb (67.1 kg) 149 lb 14.6 oz (68 kg)     Vital Signs with Ranges  Temp:  [97.5  F (36.4  C)-99.7  F (37.6  C)] 97.6  F (36.4  C)  Pulse:  [60-98] 60  Heart Rate:  [60-98] 60  Resp:  [18-20] 18  BP: (134-156)/(64-82) 146/64  SpO2:  [91 %-95 %] 92 %  I/O last 3 completed shifts:  In: 2400.58 [P.O.:540; I.V.:1860.58]  Out: 850 [Urine:850]    Constitutional: WDWN, NAD, sleeping during exam  Respiratory: CTAB  Cardiovascular: JVD noted, RRR, no MRG  GI: soft, NT, ND, no masses  Skin/Integumen: no rash  Neuro: demented  Other:     Medications        ampicillin-sulbactam (UNASYN) IV  3 g Intravenous Q6H     sodium chloride (PF)  3 mL Intracatheter Q8H     aspirin  162 mg Oral Daily     brimonidine  1 drop Left Eye BID     dorzolamide-timolol  1 drop Both  Eyes BID     furosemide  20 mg Oral Daily     isosorbide mononitrate  30 mg Oral Daily     risperiDONE  1 mg Oral BID       Data   Data reviewed today:  I personally reviewed his labs.    Recent Labs  Lab 03/17/17  1125   WBC 7.3   HGB 11.5*   MCV 99         POTASSIUM 3.9   CHLORIDE 106   CO2 30   BUN 32*   CR 1.05   ANIONGAP 8   NIYA 8.9   *   ALBUMIN 2.9*   PROTTOTAL 6.0*   BILITOTAL 0.5   ALKPHOS 70   ALT 32   AST 23       No results found for this or any previous visit (from the past 24 hour(s)).[DJ1.1]      Revision History        User Key Date/Time User Provider Type Action    > DJ1.2 3/18/2017  3:52 PM Spencer Aaron MD Physician Sign     DJ1.1 3/18/2017  3:50 PM Spencer Aaron MD Physician             Progress Notes by Damaris Lopez SLP at 3/17/2017  4:32 PM     Author:  Damaris Lopez SLP Service:  (none) Author Type:  Speech Language Pathologist    Filed:  3/17/2017  4:33 PM Date of Service:  3/17/2017  4:32 PM Note Created:  3/17/2017  4:32 PM    Status:  Signed :  Damaris Lopez SLP (Speech Language Pathologist)            03/17/17 1600   General Information   Onset Date 03/17/17   Referring Physician Dr. Aaron   Swallowing Evaluation Bedside swallow evaluation   Behaviorial Observations Confused   Mode of current nutrition Oral diet   Comments Patient arrived to ED with concern for aspiration. Patient's wife reports Bobby has had difficulty with swallowing liquids and solids for the past couple of months. Wife reports frequent coughing and oral pocketing. Patient has a PMH of dementia.    Clinical Swallow Evaluation   Oral Musculature generally intact   Structural Abnormalities none present   Dentition present and adequate   Mucosal Quality good   Oral Musculature Comments Structures of the oral cavity were not formally assessed through [facility] due to confusion and difficulty following directions.    Clinical Swallow Eval: Nectar Thick Liquid  Texture Trial   Mode of Presentation, Nectar spoon;straw;fed by clinician   Volume of Nectar Presented 1/2 cup    Oral Phase, [place] Poor AP movement   Pharyngeal Phase, Nectar reduction in laryngeal movement   Clinical Swallow Eval: Honey Thick Liquid Texture Trial   Mode of Presentation, Honey spoon;straw;fed by clinician   Volume of Honey Presented 1/2 cup   Oral Phase, Honey Poor AP movement   Pharyngeal Phase, Honey reduction in laryngeal movement   Clinical Swallow Eval: Puree Solid Texture Trial   Mode of Presentation, Puree spoon;fed by clinician   Volume of Puree Presented 2 tsp   Oral Phase, Puree Poor AP movement  (Holding)   Pharyngeal Phase, Puree reduction in laryngeal movement   Clinical Swallow Eval: Semisolid Texture Trial   Mode of Presentation, Semisolid fed by clinician   Volume of Semisolid Food Presented 2 tsp   Oral Phase, Semisolid Poor AP movement;Residue in oral cavity  (pocketing)   Swallow Eval: Clinical Impressions   Functional Assessment Scale (FAS) 3   Dysphagia Outcome Severity Scale (VEENA) Level 3 - VEENA   Treatment Diagnosis Moderate Oropharyngeal Dysphagia   Diet texture recommendations Nectar thick liquids;Dysphagia diet level 1   Recommended Feeding/Eating Techniques check mouth frequently for oral residue/pocketing;maintain upright posture during/after eating for 30 mins;no straws;small sips/bites   Risks and Benefits of Treatment have been explained. Yes   Patient, family and/or staff in agreement with Plan of Care Yes   Clinical Impression Comments Moderate Oropharyngeal Dysphagia   Total Evaluation Time   Total Evaluation Time (Minutes) 20     Damaris Lopez MA, CCC-SLP[AD1.1]       Revision History        User Key Date/Time User Provider Type Action    > AD1.1 3/17/2017  4:33 PM Damaris Lopez, SLP Speech Language Pathologist Sign            Progress Notes by Shelly Sorensen RN at 3/17/2017  3:45 PM     Author:  Shelly Sorensen, RN Service:  (none) Author Type:  Registered Nurse     Filed:  3/17/2017  4:20 PM Date of Service:  3/17/2017  3:45 PM Note Created:  3/17/2017  3:53 PM    Status:  Signed :  Shelly Sorensen, RN (Registered Nurse)         S-(situation): Patient arrives to room 248 via cart from ER    B-(background): Fall/UTI    A-(assessment):[PR1.1]Writer[PR1.2] ass[PR1.1]u[PR1.2]amber cares from oncoming nurse[PR1.1];[PR1.2] vitals SS/[PR1.1] patient resting in bed alert to person only, pale afebrile. Fluids IV. Skinintact, left arm small indent where tourniquet was left on patient.[PR1.2]        R-(recommendations): Orders reviewed with[PR1.1] patient and spouse[PR1.2]. Will monitor patient per MD orders.     Inpatient nursing criteria listed below were met:    Health care directives status obtained and documented:[PR1.1] Yes[PR1.2]  Core Measures assessed (SSI):[PR1.1] Yes[PR1.2]  SCD's Documented:[PR1.1] provider to write orders for[PR1.2]  Vaccine assessment done and vaccines ordered if appropriate:[PR1.1] Yes[PR1.2]  Skin issues/needs documented:[PR1.1]Yes[PR1.2]  Isolation needs addressed, if appropriate:[PR1.1] Yes[PR1.2]  Fall Prevention: Care plan updated, Education given and documented[PR1.1] Yes[PR1.2]  MRSA swab completed for patient 55 years and older (exclude CARLITOS and TKA):[PR1.1] Yes[PR1.2]  My Chart patient sign up addressed and documented:[PR1.1] Yes[PR1.2]  Care Plan initiated:[PR1.1] Yes[PR1.2]  Education Assessment documented:[PR1.1]Yes[PR1.2]  Education Documented (Pre-existing chronic infection such as, MRSA/VRE need education on admission):[PR1.1] Yes[PR1.2]  New medication patient education completed and documented (Possible Side Effects of Common Medications handout):[PR1.1] Yes[PR1.2]  Home medications if not able to send immediately home with family stored here:[PR1.1] Yes[PR1.2]   Reminder note placed in discharge instructions:[PR1.1] NA[PR1.2]  Discharge planning review completed (admission navigator)[PR1.1] Yes[PR1.2]           Revision History         User Key Date/Time User Provider Type Action    > PR1.2 3/17/2017  4:20 PM Shelly Sorensen RN Registered Nurse Sign     PR1.1 3/17/2017  3:53 PM Shelly Sorensen RN Registered Nurse             ED Notes by Nancy Zamudio RN at 3/17/2017  4:08 PM     Author:  Nancy Zamudio RN Service:  (none) Author Type:  Registered Nurse    Filed:  3/17/2017  4:17 PM Date of Service:  3/17/2017  4:08 PM Note Created:  3/17/2017  4:17 PM    Status:  Signed :  Nancy Zamudio RN (Registered Nurse)         edinternaltransfer[AW1.1]         Revision History        User Key Date/Time User Provider Type Action    > AW1.1 3/17/2017  4:17 PM Nancy Zamudio RN Registered Nurse Sign            ED Notes by Nancy Zamudio RN at 3/17/2017  4:07 PM     Author:  Nancy Zamudio RN Service:  (none) Author Type:  Registered Nurse    Filed:  3/17/2017  4:08 PM Date of Service:  3/17/2017  4:07 PM Note Created:  3/17/2017  4:08 PM    Status:  Signed :  Nancy Zamudio RN (Registered Nurse)         .e[AW1.1]       Revision History        User Key Date/Time User Provider Type Action    > AW1.1 3/17/2017  4:08 PM Nancy Zamudio RN Registered Nurse Sign            Progress Notes by Jenny Tran RN at 3/17/2017  3:56 PM     Author:  Jenny Tran RN Service:  (none) Author Type:  Registered Nurse    Filed:  3/17/2017  3:56 PM Date of Service:  3/17/2017  3:56 PM Note Created:  3/17/2017  3:56 PM    Status:  Signed :  Jenny Tran RN (Registered Nurse)         Pts.wife gives permission to given all of their children information regarding pt.[DK1.1]     Revision History        User Key Date/Time User Provider Type Action    > DK1.1 3/17/2017  3:56 PM Jenny Tran RN Registered Nurse Sign            Progress Notes by eJnny Tran RN at 3/17/2017  3:35 PM     Author:  Jenny Tran RN Service:  (none) Author Type:  Registered Nurse    Filed:  3/17/2017  3:49 PM Date of Service:  3/17/2017  3:35 PM Note Created:  3/17/2017  3:46 PM     Status:  Signed :  Jenny Tran RN (Registered Nurse)         CNA in room with pt.and taking his vitals she noted a tourniquet on pts.left upper arm.Indent present.Circulation to left hand and left lower arm is good without any problems.Tourniquet applied in the emergency dept.[DK1.1]     Revision History        User Key Date/Time User Provider Type Action    > DK1.1 3/17/2017  3:49 PM Jenny Tran RN Registered Nurse Sign            Progress Notes by Jenny Tran RN at 3/17/2017  2:48 PM     Author:  Jenny Tarn RN Service:  (none) Author Type:  Registered Nurse    Filed:  3/17/2017  3:38 PM Date of Service:  3/17/2017  2:48 PM Note Created:  3/17/2017  3:36 PM    Status:  Signed :  Jenny Tran RN (Registered Nurse)         Pt.admitted to Med/Surg.Report given to Kasey for her to resume admission orders and cares.Pt.s wife is present.[DK1.1]     Revision History        User Key Date/Time User Provider Type Action    > DK1.1 3/17/2017  3:38 PM Jenny Tran RN Registered Nurse Sign            ED Provider Notes by Juan Starkey MD at 3/17/2017 11:18 AM     Author:  Juan Starkey MD Service:  Emergency Medicine Author Type:  Physician    Filed:  3/17/2017  2:55 PM Date of Service:  3/17/2017 11:18 AM Note Created:  3/17/2017 11:43 AM    Status:  Signed :  Juan Starkey MD (Physician)           History[DA1.1]   No chief complaint on file.[DA1.2]    The history is provided by the spouse and medical records.     Bobby Hanson is a 86 year old male who is unresponsive.  Bobby is unable to give me any history.  Bobby's wife is here in the emergency department.  She said that he has dementia and she does not believe him home alone.  He is typically pretty independent in terms of ADLs.  This morning she checked on him at about 3 or 4:00 AM and he was sleeping in bed.  At some point he got up, got himself dressed and was out sleeping in the recliner at 6:00 AM.  She  "made breakfast and he sat up at the table eating breakfast, but was choking on food and liquid.  She said that he has had more problems with eating and drinking lately.  She had a hair appointment at 9:30 AM and brought him with her.  He went over to the [facility] which was right next door.  After her hair appointment she came to find him sitting at the table.  He was eating and drinking and again was choking and having trouble breathing.  Only with assistance were they able to get him out to the car.  She brought him to the clinic in [place].  At the clinic his systolic pressure was 70 mmHg and his heart rate was 38 bpm.  They put him on oxygen, which showed improvement.  They brought him here to the emergency department by ambulance.    We did discuss end-of-life care in great detail.  She would accept cardiac medications by IV but does not want chest compressions or electrical cardioversion.  She would accept nasal cannula, nonrebreather mask or BiPAP but does not want intubation.  She said that these decisions have been discussed in the past and she reaffirmed these decisions with me today.    I have reviewed the Medications, Allergies, Past Medical and Surgical History, and Social History in the Epic system.    Review of Systems   Unable to perform ROS: Acuity of condition       Physical Exam     BP: 103/58  Heart Rate: 64  Temp: 97.3  F (36.3  C) (Unable to obtain oral temp; confusion)  Resp: 24  Height: 162.6 cm (5' 4\")  Weight: 67.1 kg (148 lb)  SpO2: 91 %    Physical Exam   Constitutional:   Patient is minimally responsive with a rattly breathing.  He has a nasal cannula on with oxygen at 2 L.   HENT:   Head: Normocephalic and atraumatic.   Right Ear: External ear normal.   Left Ear: External ear normal.   Nose: Nose normal.   Mouth/Throat: Oropharynx is clear and moist.   Eyes: Conjunctivae and EOM are normal.   Neck: No JVD present.   Cardiovascular: Normal rate, regular rhythm, normal heart " sounds and intact distal pulses.    No murmur heard.  Pulmonary/Chest:   He has coarse, rattling breath sounds bilaterally.  Good air movement throughout.   Abdominal: Soft. Bowel sounds are normal. He exhibits no distension. There is no tenderness.   Lymphadenopathy:     He has no cervical adenopathy.   Neurological:   Decreased mental status.  Minimally responsive.   Skin: Skin is warm and dry. No rash noted. No erythema.       ED Course  Bobby came to the emergency department with decreased consciousness, rattling breath sounds and a recent history of aspiration.  We did review his code status as noted above.  The patient's wife said that they would be okay with cardiac medications by IV but no chest compressions or electrical cardioversion.  They would be okay with nasal cannula, nonrebreather mask and BiPAP but no intubation.  At this time she does not want me to place a central line.  We did talk about the possible need for pressor medications and she would like to wait on a central line at this point.  I have spoke with Dr. Aaron who has accepted the patient to the floor.  We started medications for aspiration pneumonia.  We have not started pressor medications because his systolic blood pressures about 103 mmHg at this time.[DA1.1]           ED Course[DA1.2]     Procedures      Critical Care time:  non[DA1.1]e[DA1.3]    Results for orders placed or performed during the hospital encounter of 03/17/17 (from the past 24 hour(s))   Glucose by meter   Result Value Ref Range    Glucose 167 (H) 70 - 99 mg/dL   CBC with platelets differential   Result Value Ref Range    WBC 7.3 4.0 - 11.0 10e9/L    RBC Count 3.64 (L) 4.4 - 5.9 10e12/L    Hemoglobin 11.5 (L) 13.3 - 17.7 g/dL    Hematocrit 35.9 (L) 40.0 - 53.0 %    MCV 99 78 - 100 fl    MCH 31.6 26.5 - 33.0 pg    MCHC 32.0 31.5 - 36.5 g/dL    RDW 14.4 10.0 - 15.0 %    Platelet Count 211 150 - 450 10e9/L    Diff Method Automated Method     % Neutrophils 77.6 %    %  Lymphocytes 9.3 %    % Monocytes 8.9 %    % Eosinophils 3.6 %    % Basophils 0.3 %    % Immature Granulocytes 0.3 %    Absolute Neutrophil 5.7 1.6 - 8.3 10e9/L    Absolute Lymphocytes 0.7 (L) 0.8 - 5.3 10e9/L    Absolute Monocytes 0.7 0.0 - 1.3 10e9/L    Absolute Eosinophils 0.3 0.0 - 0.7 10e9/L    Absolute Basophils 0.0 0.0 - 0.2 10e9/L    Abs Immature Granulocytes 0.0 0 - 0.4 10e9/L   Nt probnp inpatient   Result Value Ref Range    N-Terminal Pro BNP Inpatient 502 0 - 1800 pg/mL   Comprehensive metabolic panel   Result Value Ref Range    Sodium 144 133 - 144 mmol/L    Potassium 3.9 3.4 - 5.3 mmol/L    Chloride 106 94 - 109 mmol/L    Carbon Dioxide 30 20 - 32 mmol/L    Anion Gap 8 3 - 14 mmol/L    Glucose 159 (H) 70 - 99 mg/dL    Urea Nitrogen 32 (H) 7 - 30 mg/dL    Creatinine 1.05 0.66 - 1.25 mg/dL    GFR Estimate 67 >60 mL/min/1.7m2    GFR Estimate If Black 81 >60 mL/min/1.7m2    Calcium 8.9 8.5 - 10.1 mg/dL    Bilirubin Total 0.5 0.2 - 1.3 mg/dL    Albumin 2.9 (L) 3.4 - 5.0 g/dL    Protein Total 6.0 (L) 6.8 - 8.8 g/dL    Alkaline Phosphatase 70 40 - 150 U/L    ALT 32 0 - 70 U/L    AST 23 0 - 45 U/L   Lactic acid whole blood   Result Value Ref Range    Lactic Acid 1.9 0.7 - 2.1 mmol/L   Blood gas venous   Result Value Ref Range    Ph Venous 7.38 7.32 - 7.43 pH    PCO2 Venous 51 (H) 40 - 50 mm Hg    PO2 Venous 54 (H) 25 - 47 mm Hg    Bicarbonate Venous 30 (H) 21 - 28 mmol/L    Base Excess Venous 4.6 mmol/L    FIO2 28    XR Chest Port 1 View    Narrative    XR CHEST PORT 1 VW 3/17/2017 11:53 AM     HISTORY: congestion, CHF    COMPARISON: 2/10/2017      Impression    IMPRESSION: Cardiac size is at the upper limits of normal and  unchanged. There is aortic calcification. Probable mild pulmonary  vascular congestion. There are bibasilar infiltrates, new since the  previous examination.    ARON GRAHAM MD   UA with Microscopic   Result Value Ref Range    Color Urine Yellow     Appearance Urine Clear     Glucose Urine  Negative NEG mg/dL    Bilirubin Urine Negative NEG    Ketones Urine Negative NEG mg/dL    Specific Gravity Urine 1.020 1.003 - 1.035    pH Urine 6.0 5.0 - 7.0 pH    Protein Albumin Urine Negative NEG mg/dL    Urobilinogen Urine 0.2 0.2 - 1.0 EU/dL    Nitrite Urine Negative NEG    Blood Urine Moderate (A) NEG    Leukocyte Esterase Urine Small (A) NEG    Source Catheterized Urine     WBC Urine  (A) 0 - 2 /HPF    RBC Urine 25-50 (A) 0 - 2 /HPF    Hyaline Casts O - 2 0 - 2 /LPF    Squamous Epithelial /LPF Urine Few FEW /LPF    Bacteria Urine Moderate (A) NEG /HPF    Mucous Urine Present (A) NEG /LPF       Medications   sodium chloride (PF) 0.9% PF flush 3 mL (not administered)   sodium chloride (PF) 0.9% PF flush 3 mL (not administered)   0.9% sodium chloride BOLUS (0 mLs Intravenous Stopped 3/17/17 1219)     Followed by   0.9% sodium chloride infusion (1,000 mLs Intravenous New Bag 3/17/17 1333)   ampicillin-sulbactam (UNASYN) 3 g vial to attach to  mL bag (0 g Intravenous Stopped 3/17/17 1216)   ipratropium - albuterol 0.5 mg/2.5 mg/3 mL (DUONEB) neb solution 3 mL (3 mLs Nebulization Given 3/17/17 1214)   lidocaine 2 % (Uro-Jet) jelly 20 mL (20 mLs Urethral Given 3/17/17 1216)[DA1.2]       Assessments & Plan (with Medical Decision Making)[DA1.1]  Bobby came to the emergency department from the clinic.  He was up and around as usual this morning and was dressed and sleeping in the lazy boy recliner at about 6:00 when his wife got out of bed.  She made breakfast and he sat at the table and ate breakfast, feeding himself.  He started to choke and have some shortness of breath and coughing.  He seemed to recover.  She took him with when she went for a hair appointment because she does not leave him home alone due to his dementia.  He went to the [facility] next door to her hair appointment and when she picked him up he was again coughing and choking while trying to eat and drink.  They were able to  get him into the car and taken to the clinic, but when he got there his systolic pressure was about 70 mmHg and his heart rate was in the 30s.  They gave him oxygen and sent him to the emergency department by ambulance.  Here in the ED he is minimally responsive and I can only get the previous history from his wife.  His exam shows decreased breath sounds and coarse breath sounds bilaterally and he has decreased mental status.  Patient's labs show a lactic acid of 1.9, a normal white count and a normal pH.  We did do a sepsis protocol so urine was done and is abnormal, but I'm going to wait on cultures before treating that.  The patient does have bibasilar pneumonia on x-ray.  I have started him on Unasyn for aspiration pneumonia.  I did discuss his code status as noted above.  I did discuss central line, and his wife would like us to wait on that.  The patient will be admitted to the hospital.  I spoke with Dr. Aaron and I did write admission orders.[DA1.3]       I have reviewed the nursing notes.    I have reviewed the findings, diagnosis, plan and need for follow up with the patient.[DA1.1]    Current Discharge Medication List          Final diagnoses:   Aspiration pneumonia of both lungs, unspecified aspiration pneumonia type, unspecified part of lung (H)[DA1.2]       3/17/2017   [facility] EMERGENCY DEPARTMENT[DA1.1]     Juan Starkey MD  03/17/17 6125  [DA1.4]     Revision History        User Key Date/Time User Provider Type Action    > DA1.4 3/17/2017  2:55 PM Juan Starkey MD Physician Sign     DA1.2 3/17/2017  2:54 PM Juan Starkey MD Physician      DA1.3 3/17/2017  2:52 PM Juan Starkey MD Physician      DA1.1 3/17/2017 12:02 PM Juan Starkey MD Physician Share            ED Notes by Nancy Zamudio RN at 3/17/2017  1:40 PM     Author:  Nancy Zamudio RN Service:  (none) Author Type:  Registered Nurse    Filed:  3/17/2017  2:07 PM Date of Service:   3/17/2017  1:40 PM Note Created:  3/17/2017  2:07 PM    Status:  Signed :  Nancy Zamudio RN (Registered Nurse)         Spouse at bedside.[AW1.1]     Revision History        User Key Date/Time User Provider Type Action    > AW1.1 3/17/2017  2:07 PM Nancy Zamudio RN Registered Nurse Sign            ED Notes by Nancy Zamudio RN at 3/17/2017  2:07 PM     Author:  Nancy Zamudio RN Service:  (none) Author Type:  Registered Nurse    Filed:  3/17/2017  2:07 PM Date of Service:  3/17/2017  2:07 PM Note Created:  3/17/2017  2:07 PM    Status:  Signed :  Nancy Zamudio RN (Registered Nurse)         Family at bedside.[AW1.1]     Revision History        User Key Date/Time User Provider Type Action    > AW1.1 3/17/2017  2:07 PM Nancy Zamudio RN Registered Nurse Sign            ED Notes by Nancy Zamudio RN at 3/17/2017 12:20 PM     Author:  Nancy Zamudio RN Service:  (none) Author Type:  Registered Nurse    Filed:  3/17/2017  2:07 PM Date of Service:  3/17/2017 12:20 PM Note Created:  3/17/2017  2:07 PM    Status:  Signed :  Nancy Zamudio RN (Registered Nurse)         Pt. Is grunting with expiration and the spouse reports that this is new since he aspirated this morning[AW1.1]     Revision History        User Key Date/Time User Provider Type Action    > AW1.1 3/17/2017  2:07 PM Nancy Zamudio RN Registered Nurse Sign            ED Notes by Teresa Sandy RN at 3/17/2017 11:30 AM     Author:  Teresa Sandy RN Service:  Emergency Medicine Author Type:  Registered Nurse    Filed:  3/17/2017 11:32 AM Date of Service:  3/17/2017 11:30 AM Note Created:  3/17/2017 11:32 AM    Status:  Signed :  Teresa Sandy RN (Registered Nurse)         Dr. Starkey talking with patient's spouse XimenaRaymond Sandy RN[SL1.1]     Revision History        User Key Date/Time User Provider Type Action    > SL1.1 3/17/2017 11:32 AM Sandy, Teresa, RN Registered Nurse Sign            ED Notes by Teresa Sandy, RN at  3/17/2017 11:25 AM     Author:  Teresa Sandy RN Service:  Emergency Medicine Author Type:  Registered Nurse    Filed:  3/17/2017 11:30 AM Date of Service:  3/17/2017 11:25 AM Note Created:  3/17/2017 11:30 AM    Status:  Signed :  Teresa Sandy RN (Registered Nurse)         Patient placed on continuous EKG, BP and O2 sat monitor. O2 on at 2 LPM via NC.[SL1.1] Teresa Sandy[SL1.2] RN[SL1.1]     Revision History        User Key Date/Time User Provider Type Action    > SL1.2 3/17/2017 11:30 AM Teresa Sandy RN Registered Nurse Sign     SL1.1 3/17/2017 11:29 AM Teresa Sandy RN Registered Nurse             ED Notes by Teresa Sandy RN at 3/17/2017 11:21 AM     Author:  Teresa Sandy RN Service:  Emergency Medicine Author Type:  Registered Nurse    Filed:  3/17/2017 11:21 AM Date of Service:  3/17/2017 11:21 AM Note Created:  3/17/2017 11:21 AM    Status:  Signed :  Teresa Sandy, RN (Registered Nurse)         Brought to ED via EMS from [facility] with concerns for low BP 75/41 and Sats 70. Teresa Sandy RN[SL1.1]     Revision History        User Key Date/Time User Provider Type Action    > SL1.1 3/17/2017 11:21 AM Teresa Sandy, RN Registered Nurse Sign                  Procedure Notes     No notes of this type exist for this encounter.      Progress Notes - Therapies (Notes from 03/17/17 through 03/20/17)     No notes of this type exist for this encounter.

## 2017-03-17 NOTE — ED PROVIDER NOTES
History   No chief complaint on file.    The history is provided by the spouse and medical records.     Bobby Hanson is a 86 year old male who is unresponsive.  Bobby is unable to give me any history.  Bobby's wife is here in the emergency department.  She said that he has dementia and she does not believe him home alone.  He is typically pretty independent in terms of ADLs.  This morning she checked on him at about 3 or 4:00 AM and he was sleeping in bed.  At some point he got up, got himself dressed and was out sleeping in the recliner at 6:00 AM.  She made breakfast and he sat up at the table eating breakfast, but was choking on food and liquid.  She said that he has had more problems with eating and drinking lately.  She had a hair appointment at 9:30 AM and brought him with her.  He went over to the [facility] which was right next door.  After her hair appointment she came to find him sitting at the table.  He was eating and drinking and again was choking and having trouble breathing.  Only with assistance were they able to get him out to the car.  She brought him to the clinic in [place].  At the clinic his systolic pressure was 70 mmHg and his heart rate was 38 bpm.  They put him on oxygen, which showed improvement.  They brought him here to the emergency department by ambulance.    We did discuss end-of-life care in great detail.  She would accept cardiac medications by IV but does not want chest compressions or electrical cardioversion.  She would accept nasal cannula, nonrebreather mask or BiPAP but does not want intubation.  She said that these decisions have been discussed in the past and she reaffirmed these decisions with me today.    I have reviewed the Medications, Allergies, Past Medical and Surgical History, and Social History in the Epic system.    Review of Systems   Unable to perform ROS: Acuity of condition       Physical Exam     BP: 103/58  Heart Rate: 64  Temp: 97.3  F (36.3  C)  "(Unable to obtain oral temp; confusion)  Resp: 24  Height: 162.6 cm (5' 4\")  Weight: 67.1 kg (148 lb)  SpO2: 91 %    Physical Exam   Constitutional:   Patient is minimally responsive with a rattly breathing.  He has a nasal cannula on with oxygen at 2 L.   HENT:   Head: Normocephalic and atraumatic.   Right Ear: External ear normal.   Left Ear: External ear normal.   Nose: Nose normal.   Mouth/Throat: Oropharynx is clear and moist.   Eyes: Conjunctivae and EOM are normal.   Neck: No JVD present.   Cardiovascular: Normal rate, regular rhythm, normal heart sounds and intact distal pulses.    No murmur heard.  Pulmonary/Chest:   He has coarse, rattling breath sounds bilaterally.  Good air movement throughout.   Abdominal: Soft. Bowel sounds are normal. He exhibits no distension. There is no tenderness.   Lymphadenopathy:     He has no cervical adenopathy.   Neurological:   Decreased mental status.  Minimally responsive.   Skin: Skin is warm and dry. No rash noted. No erythema.       ED Course  Bobby came to the emergency department with decreased consciousness, rattling breath sounds and a recent history of aspiration.  We did review his code status as noted above.  The patient's wife said that they would be okay with cardiac medications by IV but no chest compressions or electrical cardioversion.  They would be okay with nasal cannula, nonrebreather mask and BiPAP but no intubation.  At this time she does not want me to place a central line.  We did talk about the possible need for pressor medications and she would like to wait on a central line at this point.  I have spoke with Dr. Aaron who has accepted the patient to the floor.  We started medications for aspiration pneumonia.  We have not started pressor medications because his systolic blood pressures about 103 mmHg at this time.           ED Course     Procedures      Critical Care time:  none    Results for orders placed or performed during the hospital " encounter of 03/17/17 (from the past 24 hour(s))   Glucose by meter   Result Value Ref Range    Glucose 167 (H) 70 - 99 mg/dL   CBC with platelets differential   Result Value Ref Range    WBC 7.3 4.0 - 11.0 10e9/L    RBC Count 3.64 (L) 4.4 - 5.9 10e12/L    Hemoglobin 11.5 (L) 13.3 - 17.7 g/dL    Hematocrit 35.9 (L) 40.0 - 53.0 %    MCV 99 78 - 100 fl    MCH 31.6 26.5 - 33.0 pg    MCHC 32.0 31.5 - 36.5 g/dL    RDW 14.4 10.0 - 15.0 %    Platelet Count 211 150 - 450 10e9/L    Diff Method Automated Method     % Neutrophils 77.6 %    % Lymphocytes 9.3 %    % Monocytes 8.9 %    % Eosinophils 3.6 %    % Basophils 0.3 %    % Immature Granulocytes 0.3 %    Absolute Neutrophil 5.7 1.6 - 8.3 10e9/L    Absolute Lymphocytes 0.7 (L) 0.8 - 5.3 10e9/L    Absolute Monocytes 0.7 0.0 - 1.3 10e9/L    Absolute Eosinophils 0.3 0.0 - 0.7 10e9/L    Absolute Basophils 0.0 0.0 - 0.2 10e9/L    Abs Immature Granulocytes 0.0 0 - 0.4 10e9/L   Nt probnp inpatient   Result Value Ref Range    N-Terminal Pro BNP Inpatient 502 0 - 1800 pg/mL   Comprehensive metabolic panel   Result Value Ref Range    Sodium 144 133 - 144 mmol/L    Potassium 3.9 3.4 - 5.3 mmol/L    Chloride 106 94 - 109 mmol/L    Carbon Dioxide 30 20 - 32 mmol/L    Anion Gap 8 3 - 14 mmol/L    Glucose 159 (H) 70 - 99 mg/dL    Urea Nitrogen 32 (H) 7 - 30 mg/dL    Creatinine 1.05 0.66 - 1.25 mg/dL    GFR Estimate 67 >60 mL/min/1.7m2    GFR Estimate If Black 81 >60 mL/min/1.7m2    Calcium 8.9 8.5 - 10.1 mg/dL    Bilirubin Total 0.5 0.2 - 1.3 mg/dL    Albumin 2.9 (L) 3.4 - 5.0 g/dL    Protein Total 6.0 (L) 6.8 - 8.8 g/dL    Alkaline Phosphatase 70 40 - 150 U/L    ALT 32 0 - 70 U/L    AST 23 0 - 45 U/L   Lactic acid whole blood   Result Value Ref Range    Lactic Acid 1.9 0.7 - 2.1 mmol/L   Blood gas venous   Result Value Ref Range    Ph Venous 7.38 7.32 - 7.43 pH    PCO2 Venous 51 (H) 40 - 50 mm Hg    PO2 Venous 54 (H) 25 - 47 mm Hg    Bicarbonate Venous 30 (H) 21 - 28 mmol/L    Base  Excess Venous 4.6 mmol/L    FIO2 28    XR Chest Port 1 View    Narrative    XR CHEST PORT 1 VW 3/17/2017 11:53 AM     HISTORY: congestion, CHF    COMPARISON: 2/10/2017      Impression    IMPRESSION: Cardiac size is at the upper limits of normal and  unchanged. There is aortic calcification. Probable mild pulmonary  vascular congestion. There are bibasilar infiltrates, new since the  previous examination.    ARON GRAHAM MD   UA with Microscopic   Result Value Ref Range    Color Urine Yellow     Appearance Urine Clear     Glucose Urine Negative NEG mg/dL    Bilirubin Urine Negative NEG    Ketones Urine Negative NEG mg/dL    Specific Gravity Urine 1.020 1.003 - 1.035    pH Urine 6.0 5.0 - 7.0 pH    Protein Albumin Urine Negative NEG mg/dL    Urobilinogen Urine 0.2 0.2 - 1.0 EU/dL    Nitrite Urine Negative NEG    Blood Urine Moderate (A) NEG    Leukocyte Esterase Urine Small (A) NEG    Source Catheterized Urine     WBC Urine  (A) 0 - 2 /HPF    RBC Urine 25-50 (A) 0 - 2 /HPF    Hyaline Casts O - 2 0 - 2 /LPF    Squamous Epithelial /LPF Urine Few FEW /LPF    Bacteria Urine Moderate (A) NEG /HPF    Mucous Urine Present (A) NEG /LPF       Medications   sodium chloride (PF) 0.9% PF flush 3 mL (not administered)   sodium chloride (PF) 0.9% PF flush 3 mL (not administered)   0.9% sodium chloride BOLUS (0 mLs Intravenous Stopped 3/17/17 1219)     Followed by   0.9% sodium chloride infusion (1,000 mLs Intravenous New Bag 3/17/17 1333)   ampicillin-sulbactam (UNASYN) 3 g vial to attach to  mL bag (0 g Intravenous Stopped 3/17/17 1216)   ipratropium - albuterol 0.5 mg/2.5 mg/3 mL (DUONEB) neb solution 3 mL (3 mLs Nebulization Given 3/17/17 1214)   lidocaine 2 % (Uro-Jet) jelly 20 mL (20 mLs Urethral Given 3/17/17 1216)       Assessments & Plan (with Medical Decision Making)  Bobby came to the emergency department from the clinic.  He was up and around as usual this morning and was dressed and sleeping in the lazy  boy recliner at about 6:00 when his wife got out of bed.  She made breakfast and he sat at the table and ate breakfast, feeding himself.  He started to choke and have some shortness of breath and coughing.  He seemed to recover.  She took him with when she went for a hair appointment because she does not leave him home alone due to his dementia.  He went to the [facility] next door to her hair appointment and when she picked him up he was again coughing and choking while trying to eat and drink.  They were able to get him into the car and taken to the clinic, but when he got there his systolic pressure was about 70 mmHg and his heart rate was in the 30s.  They gave him oxygen and sent him to the emergency department by ambulance.  Here in the ED he is minimally responsive and I can only get the previous history from his wife.  His exam shows decreased breath sounds and coarse breath sounds bilaterally and he has decreased mental status.  Patient's labs show a lactic acid of 1.9, a normal white count and a normal pH.  We did do a sepsis protocol so urine was done and is abnormal, but I'm going to wait on cultures before treating that.  The patient does have bibasilar pneumonia on x-ray.  I have started him on Unasyn for aspiration pneumonia.  I did discuss his code status as noted above.  I did discuss central line, and his wife would like us to wait on that.  The patient will be admitted to the hospital.  I spoke with Dr. Aaron and I did write admission orders.       I have reviewed the nursing notes.    I have reviewed the findings, diagnosis, plan and need for follow up with the patient.    Current Discharge Medication List          Final diagnoses:   Aspiration pneumonia of both lungs, unspecified aspiration pneumonia type, unspecified part of lung (H)       3/17/2017   [facility] EMERGENCY DEPARTMENT     Juan Starkey MD  03/17/17 3774

## 2017-03-17 NOTE — IP AVS SNAPSHOT
` ` Patient Information     Patient Name Sex     Bobby Hanson P ([id number]) Male 1931       Room Bed    248 248-01      Patient Demographics     Address Phone E-mail Address    [address]  [facility] [address] [phone] (Home) [email or web address]      Patient Ethnicity & Race     Ethnic Group Patient Race    American White      Emergency Contact(s)     Name Relation Home Work Mobile    Ximena Hanson Spouse [phone]    FRANCA FUNK Daughter [phone]    REBA FUNK Relative [phone]      Documents on File        Status Date Received Description       Documents for the Patient    Insurance Card Received () 11/17/10     Face Sheet Received () 08     Insurance Card Received () 11/17/10     Insurance Card Received () 11/17/10     Insurance Card Received () 11/17/10     Face Sheet Received () 09     External Medication Information Consent Accepted () 09     Privacy Notice - [place] Received 11/17/10     Patient ID Received () 16 expires 2017    Consent for Services - Hospital/Clinic Received () 11/17/10     External Medication Information Consent Accepted () 11/17/10     HIM CRISTHIAN Authorization   [facility] Authorization - [facility] - 11    External Medication Information Consent Accepted 10/03/12     Consent for Services - Hospital/Clinic Received () 10/03/12     Insurance Card Received 10/03/12 Medicare    Insurance Card Received () 10/03/12 [employer]    Consent for EHR Access  13 Copied from existing Consent for services - C/HOD collected on 10/03/2012    [facility] Specified Other       Consent for Services - Hospital/Clinic Received () 14     Consent to Communicate Received 01/23/15     Consent for Services/Privacy Notice - Hospital/Clinic Received 16     HIM CRISTHIAN Authorization  16 Department of the [facility]     Insurance Card Received 01/03/17 medica    Insurance Card Received 02/10/17 Medicare    Insurance Card Received 02/10/17 [employer]    Patient ID Received 02/10/17     Privacy Notice - [place]  (Deleted) 08/21/03     Insurance Card Received (Deleted) 01/03/17 medica    Patient Photo   Photo of Patient       Documents for the Encounter    CMS IM for Patient Signature Received 03/19/17     ECG   ECG Report      Admission Information     Attending Provider Admitting Provider Admission Type Admission Date/Time    Spencer Aaron MD Jones, Daniel William, MD Emergency 03/17/17  1118    Discharge Date Hospital Service Auth/Cert Status Service Area     Hospitalist Incomplete [facility] SERVICES    Unit Room/Bed Admission Status       PH 2A MEDICAL SURGICAL 248/248-01 Admission (Confirmed)       Admission     Complaint    Aspiration pneumonia (H)      Hospital Account     Name Acct ID Class Status Primary Coverage    Bobby Hanson [id number] Inpatient Open MEDICARE - MEDICARE RR FOR HB SUPPLEMENT            Guarantor Account (for Hospital Account #[id number])     Name Relation to Pt Service Area Active? Acct Type    Bobby Hanson  FCS Yes Personal/Family    Address Phone          [address] [place] [address] [phone](H)              Coverage Information (for Hospital Account #[id number])     1. MEDICARE/MEDICARE RR FOR HB SUPPLEMENT     F/O Payor/Plan Precert #    MEDICARE/MEDICARE RR FOR HB SUPPLEMENT     Subscriber Subscriber #    Bobby Hanson [id number]    Address Phone    [address]  [place] [address] [phone]          2. MEDICA/MEDICA PRIME SOLUTION     F/O Payor/Plan Precert #    MEDICA/MEDICA PRIME SOLUTION     Subscriber Subscriber #    Bobby Hanson [id number]    Address Phone    [address] [address]  [place] [address] [phone]

## 2017-03-17 NOTE — IP AVS SNAPSHOT
MRN:[id number]                      After Visit Summary   3/17/2017    Bobby Hanson    MRN: [id number]           Thank you!     Thank you for choosing [facility] for your care. Our goal is always to provide you with excellent care. Hearing back from our patients is one way we can continue to improve our services. Please take a few minutes to complete the written survey that you may receive in the mail after you visit with us. Thank you!        Patient Information     Date Of Birth          1/31/1931        About your hospital stay     You were admitted on:  March 17, 2017 You last received care in the:  [address] [facility]    You were discharged on:  March 20, 2017        Reason for your hospital stay       Hospitalized for suspected infection and improved                  Who to Call     For medical emergencies, please call 911.  For non-urgent questions about your medical care, please call your primary care provider or clinic, [phone]          Attending Provider     Provider Specialty    Juan Starkey MD [facility], Spencer De Anda MD [facility]       Primary Care Provider Office Phone # Fax #    Rao Lopez [phone]       [facility] [address] [facility] [address]        After Care Instructions     Activity - Up with nursing assistance           Advance Diet as Tolerated       Follow this diet upon discharge: Orders Placed This Encounter      Combination Diet Regular Diet Adult; Dysphagia Diet Level 1: Pureed; Nectar Thickened Liquids (pre-thickened or use instant food thickener)            General info for SNF       Length of Stay Estimate: Long Term Care  Condition at Discharge: Declining  Level of care:skilled   Rehabilitation Potential: Poor  Admission H&P remains valid and up-to-date: Yes  Recent Chemotherapy: N/A  Use Nursing Home Standing Orders: Yes                  Follow-up Appointments  "    Follow Up and recommended labs and tests       Follow up with Nursing home physician.                  Additional Services     Physical Therapy Adult Consult       Evaluate and treat as clinically indicated.    Reason:  Weakness, sepsis                  Pending Results     Date and Time Order Name Status Description    3/17/2017 1143 Blood culture Preliminary     3/17/2017 1143 Blood culture Preliminary             Statement of Approval     Ordered          03/20/17 1159  I have reviewed and agree with all the recommendations and orders detailed in this document.  EFFECTIVE NOW     Approved and electronically signed by:  Kurtis Stoner MD             Admission Information     Date & Time Provider Department Dept. Phone    3/17/2017 Spencer Aaron MD [address] Medical Surgical [phone]      Your Vitals Were     Blood Pressure Pulse Temperature Respirations Height Weight    130/74 (BP Location: Right arm) 69 97  F (36.1  C) (Oral) 20 1.626 m (5' 4\") 68 kg (149 lb 14.6 oz)    Pulse Oximetry BMI (Body Mass Index)                90% 25.73 kg/m2          [facility] Information     [facility] gives you secure access to your electronic health record. If you see a primary care provider, you can also send messages to your care team and make appointments. If you have questions, please call your primary care clinic.  If you do not have a primary care provider, please call [phone] and they will assist you.        Care EveryWhere ID     This is your Care EveryWhere ID. This could be used by other organizations to access your [facility] medical records  [id number]           Review of your medicines      START taking        Dose / Directions    amoxicillin-clavulanate 875-125 MG per tablet   Commonly known as:  AUGMENTIN   Used for:  Aspiration pneumonia of both lungs, unspecified aspiration pneumonia type, unspecified part of lung (H)        Dose:  1 tablet   Take 1 tablet by mouth 2 times daily for 5 " days   Quantity:  10 tablet   Refills:  0         CONTINUE these medicines which may have CHANGED, or have new prescriptions. If we are uncertain of the size of tablets/capsules you have at home, strength may be listed as something that might have changed.        Dose / Directions    aspirin 81 MG tablet   This may have changed:    - how much to take  - how to take this  - when to take this  - additional instructions   Used for:  Atherosclerotic cerebrovascular disease        Dose:  162 mg   Take 2 tablets (162 mg) by mouth daily   Quantity:  100 tablet   Refills:  3       brimonidine 0.2 % ophthalmic solution   Commonly known as:  ALPHAGAN   This may have changed:    - medication strength  - how to take this  - additional instructions   Used for:  Eye disease        Dose:  1 drop   Place 1 drop Into the left eye 2 times daily   Quantity:  1 Bottle   Refills:  0       dorzolamide-timolol 2-0.5 % ophthalmic solution   Commonly known as:  COSOPT   This may have changed:    - medication strength  - how to take this  - additional instructions   Used for:  Eye disease        Dose:  1 drop   Place 1 drop into both eyes 2 times daily   Quantity:  1 Bottle   Refills:  0         CONTINUE these medicines which have NOT CHANGED        Dose / Directions    furosemide 20 MG tablet   Commonly known as:  LASIX   Used for:  Localized edema        Dose:  20 mg   Take 1 tablet (20 mg) by mouth daily   Quantity:  60 tablet   Refills:  1       isosorbide mononitrate 30 MG 24 hr tablet   Commonly known as:  IMDUR   Used for:  Coronary artery disease involving native coronary artery of native heart with unstable angina pectoris (H)        TAKE ONE TABLET BY MOUTH EVERY DAY   Quantity:  30 tablet   Refills:  5       risperiDONE 1 MG tablet   Commonly known as:  risperDAL   Used for:  Dementia with behavioral disturbance, unspecified dementia type        Dose:  1 mg   Take 1 tablet (1 mg) by mouth 2 times daily   Quantity:  60 tablet    Refills:  3       tamsulosin 0.4 MG capsule   Commonly known as:  FLOMAX   Used for:  Dysuria, Dementia with behavioral disturbance, unspecified dementia type, Benign non-nodular prostatic hyperplasia with lower urinary tract symptoms        Dose:  0.4 mg   Take 1 capsule (0.4 mg) by mouth daily   Quantity:  90 capsule   Refills:  1         STOP taking     UNABLE TO FIND           vitamin D 2000 UNITS tablet                Where to get your medicines      Some of these will need a paper prescription and others can be bought over the counter. Ask your nurse if you have questions.     You don't need a prescription for these medications     amoxicillin-clavulanate 875-125 MG per tablet    aspirin 81 MG tablet    brimonidine 0.2 % ophthalmic solution    dorzolamide-timolol 2-0.5 % ophthalmic solution                Protect others around you: Learn how to safely use, store and throw away your medicines at www.disposemymeds.org.             Medication List: This is a list of all your medications and when to take them. Check marks below indicate your daily home schedule. Keep this list as a reference.      Medications           Morning Afternoon Evening Bedtime As Needed    amoxicillin-clavulanate 875-125 MG per tablet   Commonly known as:  AUGMENTIN   Take 1 tablet by mouth 2 times daily for 5 days                                aspirin 81 MG tablet   Take 2 tablets (162 mg) by mouth daily                                brimonidine 0.2 % ophthalmic solution   Commonly known as:  ALPHAGAN   Place 1 drop Into the left eye 2 times daily   Last time this was given:  1 drop on 3/20/2017  8:39 AM                                dorzolamide-timolol 2-0.5 % ophthalmic solution   Commonly known as:  COSOPT   Place 1 drop into both eyes 2 times daily   Last time this was given:  1 drop on 3/20/2017  8:39 AM                                furosemide 20 MG tablet   Commonly known as:  LASIX   Take 1 tablet (20 mg) by mouth daily    Last time this was given:  20 mg on 3/20/2017  8:39 AM                                isosorbide mononitrate 30 MG 24 hr tablet   Commonly known as:  IMDUR   TAKE ONE TABLET BY MOUTH EVERY DAY   Last time this was given:  30 mg on 3/20/2017  8:39 AM                                risperiDONE 1 MG tablet   Commonly known as:  risperDAL   Take 1 tablet (1 mg) by mouth 2 times daily   Last time this was given:  1 mg on 3/20/2017  8:39 AM                                tamsulosin 0.4 MG capsule   Commonly known as:  FLOMAX   Take 1 capsule (0.4 mg) by mouth daily

## 2017-03-17 NOTE — IP AVS SNAPSHOT
"[address] MEDICAL SURGICAL: [phone]                                              INTERAGENCY TRANSFER FORM - PHYSICIAN ORDERS   3/17/2017                    Hospital Admission Date: 3/17/2017  TI GEIGER   : 1931  Sex: Male        Attending Provider: Spencer Aaron MD     Allergies:  No Known Drug Allergies    Infection:  None   Service:  HOSPITALIST    Ht:  1.626 m (5' 4\")   Wt:  68 kg (149 lb 14.6 oz)   Admission Wt:  67.1 kg (148 lb)    BMI:  25.73 kg/m 2   BSA:  1.75 m 2            Patient PCP Information     Provider PCP Type    Rao Lopez MD, MD General      ED Clinical Impression     Diagnosis Description Comment Added By Time Added    Aspiration pneumonia of both lungs, unspecified aspiration pneumonia type, unspecified part of lung (H) [J69.0] Aspiration pneumonia of both lungs, unspecified aspiration pneumonia type, unspecified part of lung (H) [J69.0]  Juan Starkey MD 3/17/2017 12:34 PM      Hospital Problems as of 3/20/2017              Priority Class Noted POA    Dementia with behavioral disturbance, unspecified dementia type Medium  2016 Yes    Benign non-nodular prostatic hyperplasia with lower urinary tract symptoms Medium  2016 Yes    Benign essential hypertension Medium  2016 Yes    Atherosclerotic cerebrovascular disease Medium  2017 Yes    Bacterial sepsis (H) Medium  3/17/2017 Yes    Pyuria Medium  3/17/2017 Yes    * (Principal)Aspiration pneumonia (H) Medium  3/17/2017 Yes      Non-Hospital Problems as of 3/20/2017              Priority Class Noted    CARDIOVASCULAR SCREENING; LDL GOAL LESS THAN 130   10/31/2010    Advanced directives, counseling/discussion   10/3/2012    Health Care Home   3/19/2014    Urgency incontinence Medium  2016    Caregiver burden Medium  2016    Syncope Medium  2/10/2017    Hypotension, unspecified hypotension type Medium  2/10/2017    Benign prostatic hyperplasia with lower " urinary tract symptoms Medium  2/10/2017    Bradycardia Medium  2/10/2017    Elevated troponin Medium  2/10/2017    Acute kidney injury (H) Medium  2/10/2017    Microscopic hematuria Medium  2/10/2017    Occlusion of left vertebral artery Medium  2/11/2017      Code Status History     Date Active Date Inactive Code Status Order ID Comments User Context    3/20/2017 11:59 AM  DNR/DNI [id number]  Kurtis Stoner MD Outpatient    3/17/2017  3:00 PM 3/20/2017 11:59 AM DNR/DNI [id number]  Spencer Aaron MD Inpatient    2/11/2017  1:03 PM 3/17/2017  3:00 PM DNR/DNI [id number]  Kurtis Stoner MD Outpatient    2/10/2017 10:30 PM 2/11/2017  1:03 PM DNR/DNI [id number]  Spencer Parikh MD Inpatient         Medication Review      START taking        Dose / Directions Comments    amoxicillin-clavulanate 875-125 MG per tablet   Commonly known as:  AUGMENTIN   Used for:  Aspiration pneumonia of both lungs, unspecified aspiration pneumonia type, unspecified part of lung (H)        Dose:  1 tablet   Take 1 tablet by mouth 2 times daily for 5 days   Quantity:  10 tablet   Refills:  0          CONTINUE these medications which may have CHANGED, or have new prescriptions. If we are uncertain of the size of tablets/capsules you have at home, strength may be listed as something that might have changed.        Dose / Directions Comments    aspirin 81 MG tablet   This may have changed:    - how much to take  - how to take this  - when to take this  - additional instructions   Used for:  Atherosclerotic cerebrovascular disease        Dose:  162 mg   Take 2 tablets (162 mg) by mouth daily   Quantity:  100 tablet   Refills:  3        brimonidine 0.2 % ophthalmic solution   Commonly known as:  ALPHAGAN   This may have changed:    - medication strength  - how to take this  - additional instructions   Used for:  Eye disease        Dose:  1 drop   Place 1 drop Into the left eye 2 times daily   Quantity:  1 Bottle   Refills:  0         dorzolamide-timolol 2-0.5 % ophthalmic solution   Commonly known as:  COSOPT   This may have changed:    - medication strength  - how to take this  - additional instructions   Used for:  Eye disease        Dose:  1 drop   Place 1 drop into both eyes 2 times daily   Quantity:  1 Bottle   Refills:  0          CONTINUE these medications which have NOT CHANGED        Dose / Directions Comments    furosemide 20 MG tablet   Commonly known as:  LASIX   Used for:  Localized edema        Dose:  20 mg   Take 1 tablet (20 mg) by mouth daily   Quantity:  60 tablet   Refills:  1        isosorbide mononitrate 30 MG 24 hr tablet   Commonly known as:  IMDUR   Used for:  Coronary artery disease involving native coronary artery of native heart with unstable angina pectoris (H)        TAKE ONE TABLET BY MOUTH EVERY DAY   Quantity:  30 tablet   Refills:  5        risperiDONE 1 MG tablet   Commonly known as:  risperDAL   Used for:  Dementia with behavioral disturbance, unspecified dementia type        Dose:  1 mg   Take 1 tablet (1 mg) by mouth 2 times daily   Quantity:  60 tablet   Refills:  3        tamsulosin 0.4 MG capsule   Commonly known as:  FLOMAX   Used for:  Dysuria, Dementia with behavioral disturbance, unspecified dementia type, Benign non-nodular prostatic hyperplasia with lower urinary tract symptoms        Dose:  0.4 mg   Take 1 capsule (0.4 mg) by mouth daily   Quantity:  90 capsule   Refills:  1          STOP taking     UNABLE TO FIND           vitamin D 2000 UNITS tablet                   Summary of Visit     Reason for your hospital stay       Hospitalized for suspected infection and improved             After Care     Activity - Up with nursing assistance           Advance Diet as Tolerated       Follow this diet upon discharge: Orders Placed This Encounter      Combination Diet Regular Diet Adult; Dysphagia Diet Level 1: Pureed; Nectar Thickened Liquids (pre-thickened or use instant food thickener)       General  info for SNF       Length of Stay Estimate: Long Term Care  Condition at Discharge: Declining  Level of care:skilled   Rehabilitation Potential: Poor  Admission H&P remains valid and up-to-date: Yes  Recent Chemotherapy: N/A  Use Nursing Home Standing Orders: Yes             Referrals     Physical Therapy Adult Consult       Evaluate and treat as clinically indicated.    Reason:  Weakness, sepsis             Follow-Up Appointment Instructions     Future Labs/Procedures    Follow Up and recommended labs and tests     Comments:    Follow up with Nursing home physician.      Follow-Up Appointment Instructions     Follow Up and recommended labs and tests       Follow up with Nursing home physician.             Statement of Approval     Ordered          03/20/17 1159  I have reviewed and agree with all the recommendations and orders detailed in this document.  EFFECTIVE NOW     Approved and electronically signed by:  Kurtis Stoner MD

## 2017-03-17 NOTE — PROGRESS NOTES
03/17/17 1600   General Information   Onset Date 03/17/17   Referring Physician Dr. Aaron   Swallowing Evaluation Bedside swallow evaluation   Behaviorial Observations Confused   Mode of current nutrition Oral diet   Comments Patient arrived to ED with concern for aspiration. Patient's wife reports Bobby has had difficulty with swallowing liquids and solids for the past couple of months. Wife reports frequent coughing and oral pocketing. Patient has a PMH of dementia.    Clinical Swallow Evaluation   Oral Musculature generally intact   Structural Abnormalities none present   Dentition present and adequate   Mucosal Quality good   Oral Musculature Comments Structures of the oral cavity were not formally assessed through an oral [facility] exam due to confusion and difficulty following directions.    Clinical Swallow Eval: Nectar Thick Liquid Texture Trial   Mode of Presentation, Nectar spoon;straw;fed by clinician   Volume of Nectar Presented 1/2 cup    Oral Phase, [place] Poor AP movement   Pharyngeal Phase, Nectar reduction in laryngeal movement   Clinical Swallow Eval: Honey Thick Liquid Texture Trial   Mode of Presentation, Honey spoon;straw;fed by clinician   Volume of Honey Presented 1/2 cup   Oral Phase, Honey Poor AP movement   Pharyngeal Phase, Honey reduction in laryngeal movement   Clinical Swallow Eval: Puree Solid Texture Trial   Mode of Presentation, Puree spoon;fed by clinician   Volume of Puree Presented 2 tsp   Oral Phase, Puree Poor AP movement  (Holding)   Pharyngeal Phase, Puree reduction in laryngeal movement   Clinical Swallow Eval: Semisolid Texture Trial   Mode of Presentation, Semisolid fed by clinician   Volume of Semisolid Food Presented 2 tsp   Oral Phase, Semisolid Poor AP movement;Residue in oral cavity  (pocketing)   Swallow Eval: Clinical Impressions   Functional Assessment Scale (FAS) 3   Dysphagia Outcome Severity Scale (VEENA) Level 3 - VEENA   Treatment Diagnosis Moderate Oropharyngeal  Dysphagia   Diet texture recommendations Nectar thick liquids;Dysphagia diet level 1   Recommended Feeding/Eating Techniques check mouth frequently for oral residue/pocketing;maintain upright posture during/after eating for 30 mins;no straws;small sips/bites   Risks and Benefits of Treatment have been explained. Yes   Patient, family and/or staff in agreement with Plan of Care Yes   Clinical Impression Comments Moderate Oropharyngeal Dysphagia   Total Evaluation Time   Total Evaluation Time (Minutes) 20     Damaris Lopez MA, CCC-SLP

## 2017-03-17 NOTE — IP AVS SNAPSHOT
` `     [address] MEDICAL SURGICAL: [phone]            Medication Administration Report for Bobby Hanson as of 03/20/17 1314   Legend:    Given Hold Not Given Due Canceled Entry Other Actions    Time Time (Time) Time  Time-Action       Inactive    Active    Linked        Medications 03/14/17 03/15/17 03/16/17 03/17/17 03/18/17 03/19/17 03/20/17    ampicillin-sulbactam (UNASYN) 3 g vial to attach to  mL bag  Dose: 3 g Freq: EVERY 6 HOURS Route: IV  Indications of Use: ASPIRATION PNEUMONIA  Last Dose: 0 g (03/17/17 1216)  Start: 03/17/17 1144   Admin Instructions: FIRST DOSE STAT. Start within 4 hours of patient's arrival to hospital.        1145 (3 g)-New Bag       1216-Stopped       1758 (3 g)-New Bag        0104 (3 g)-New Bag       0630 (3 g)-New Bag       1223 (3 g)-New Bag       1845 (3 g)-New Bag       2355 (3 g)-New Bag        0641 (3 g)-New Bag       1143 (3 g)-New Bag       1833 (3 g)-New Bag        0005 (3 g)-New Bag       0555 (3 g)-New Bag       1229 (3 g)-New Bag       [ ] 1744       [ ] 2344           aspirin chewable tablet 162 mg  Dose: 162 mg Freq: DAILY Route: PO  Start: 03/18/17 0900        0949 (162 mg)-Given        1001 (162 mg)-Given        0839 (162 mg)-Given           brimonidine (ALPHAGAN) 0.2 % ophthalmic solution 1 drop  Dose: 1 drop Freq: 2 TIMES DAILY Route: LEFT EYE  Start: 03/17/17 2100 2030 (1 drop)-Given        0950 (1 drop)-Given       2159 (1 drop)-Given        1002 (1 drop)-Given       2103 (1 drop)-Given        0839 (1 drop)-Given       [ ] 2100           dorzolamide-timolol (COSOPT) ophthalmic solution 1 drop  Dose: 1 drop Freq: 2 TIMES DAILY Route: Both Eyes  Start: 03/17/17 2100 2030 (1 drop)-Given        0950 (1 drop)-Given       2159 (1 drop)-Given        1002 (1 drop)-Given       2103 (1 drop)-Given        0839 (1 drop)-Given       [ ] 2100           furosemide (LASIX) tablet 20 mg  Dose: 20 mg Freq: DAILY Route: PO  Start: 03/18/17 0900  "       0949 (20 mg)-Given        1001 (20 mg)-Given        0839 (20 mg)-Given           isosorbide mononitrate (IMDUR) 24 hr tablet 30 mg  Dose: 30 mg Freq: DAILY Route: PO  Start: 03/18/17 0900   Admin Instructions: DO NOT CRUSH. Can split tablet in half along score chicho.         0949 (30 mg)-Given        1001 (30 mg)-Given        0839 (30 mg)-Given           lidocaine (LMX4) kit  Freq: EVERY 1 HOUR PRN Route: Top  PRN Reason: pain  PRN Comment: with VAD insertion or accessing implanted port.  Start: 03/17/17 1459   Admin Instructions: Do NOT give if patient has a history of allergy to any local anesthetic or any \"duglas\" product.   Apply 30 minutes prior to VAD insertion or port access.  MAX Dose:  2.5 g (  of 5 g tube)               lidocaine 1 % 1 mL  Dose: 1 mL Freq: EVERY 1 HOUR PRN Route: OTHER  PRN Comment: mild pain with VAD insertion or accessing implanted port  Start: 03/17/17 1459   Admin Instructions: Do NOT give if patient has a history of allergy to any local anesthetic or any \"duglas\" product. MAX dose 1 mL subcutaneous OR intradermal in divided doses.               naloxone (NARCAN) injection 0.1-0.4 mg  Dose: 0.1-0.4 mg Freq: EVERY 2 MIN PRN Route: IV  PRN Reason: opioid reversal  Start: 03/17/17 1459   Admin Instructions: For respiratory rate LESS than or EQUAL to 8.  Partial reversal dose:  0.1 mg titrated q 2 minutes for Analgesia Side Effects Monitoring Sedation Level of 3 (frequently drowsy, arousable, drifts to sleep during conversation).Full reversal dose:  0.4 mg bolus for Analgesia Side Effects Monitoring Sedation Level of 4 (somnolent, minimal or no response to stimulation).               risperiDONE (risperDAL) tablet 1 mg  Dose: 1 mg Freq: 2 TIMES DAILY Route: PO  Start: 03/17/17 2100 2029 (1 mg)-Given        0948 (1 mg)-Given       2200 (1 mg)-Given        1001 (1 mg)-Given       2103 (1 mg)-Given        0839 (1 mg)-Given       [ ] 2100           sodium chloride (PF) 0.9% PF flush " 3 mL  Dose: 3 mL Freq: EVERY 8 HOURS Route: IK  Start: 03/17/17 1500   Admin Instructions: And Q1H PRN, to lock peripheral IV dormant line.        1649 (3 mL)-Given       (2250)-Not Given        (0630)-Not Given       (1648)-Not Given       2200 (3 mL)-Given        0641 (3 mL)-Given       1550 (3 mL)-Given        (0016)-Not Given       (0714)-Not Given       [ ] 1400       [ ] 2200           sodium chloride (PF) 0.9% PF flush 3 mL  Dose: 3 mL Freq: EVERY 1 HOUR PRN Route: IK  PRN Reason: line flush  PRN Comment: for peripheral IV flush post IV meds  Start: 03/17/17 1459             Discontinued Medications  Medications 03/14/17 03/15/17 03/16/17 03/17/17 03/18/17 03/19/17 03/20/17         Dose: 1,000 mL Freq: ONCE Route: IV  Last Dose: Stopped (03/17/17 1219)  Start: 03/17/17 1144   End: 03/18/17 1000       1219-Stopped        1000-Med Discontinued        Followed by    Rate: 125 mL/hr Freq: CONTINUOUS Route: IV  Last Dose: Stopped (03/18/17 1014)  Start: 03/17/17 1144   End: 03/18/17 1000   Admin Instructions: Administer after the bolus.        (1110)-Not Given       1333 (1,000 mL)-New Bag       1646 (1,000 mL)-Rate/Dose Verify        1000-Med Discontinued  1014-Stopped               Dose: 3 mL Freq: EVERY 8 HOURS Route: IK  Start: 03/17/17 1144   End: 03/18/17 0851   Admin Instructions: And Q1H PRN, to lock peripheral IV dormant line.        (2249)-Not Given       (2251)-Not Given        (0504)-Not Given       0851-Med Discontinued           Dose: 3 mL Freq: EVERY 1 HOUR PRN Route: IK  PRN Reason: line flush  PRN Comment: for peripheral IV flush post IV meds  Start: 03/17/17 1142   End: 03/18/17 0851        0851-Med Discontinued

## 2017-03-17 NOTE — LETTER
Transition Communication Hand-off for Care Transitions to Next Level of Care Provider    Name: Bobby Hanson  MRN #: [id number]  Primary Care Provider: Rao Lopez MD  Primary Care MD Name: John  Primary Clinic: [facility] [address] [facility] [address]  Primary Care Clinic Name: [facility]  Reason for Hospitalization:  Aspiration pneumonia of both lungs, unspecified aspiration pneumonia type, unspecified part of lung (H) [J69.0]  Admit Date/Time: 3/17/2017 11:18 AM  Discharge Date: 3/20/17    Patient discharged to [facility] long-term care placement on 3/20/17.     NADINE Saenz   [phone]

## 2017-03-17 NOTE — PROGRESS NOTES
CNA in room with pt.and taking his vitals she noted a tourniquet on pts.left upper arm.Indent present.Circulation to left hand and left lower arm is good without any problems.Tourniquet applied in the emergency dept.

## 2017-03-17 NOTE — NURSING NOTE
Patient came into clinic today with his wife to have his vitals checked. Vitals were low, so I notified Dr. Ye and clinic RN to assess the patient. Patient was sent to the ED by ambulance per Dr. Ye's request.  Sarah Maria MA     3/17/2017

## 2017-03-17 NOTE — PROGRESS NOTES
Pt.admitted to Med/Surg.Report given to Kasey for her to resume admission orders and cares.Pt.s wife is present.

## 2017-03-17 NOTE — ED NOTES
Brought to ED via EMS from [facility] with concerns for low BP 75/41 and Sats 70. Teresa Sandy RN

## 2017-03-18 NOTE — PROGRESS NOTES
[facility]    Hospitalist Progress Note    Date of Service (when I saw the patient): 03/18/2017    Assessment & Plan   Bobby Hanson is a 86 year old male who was admitted on 3/17/2017 with aspiration pneumonia and UTI.  Clinically improving on Unasyn, but cultures negative thus far.  Dementia has progressed to the point that wife doesn't feel she can safely care for him at home.  POLST filled out.      Active Problems:    Bacterial sepsis (H)    Urinary tract infection    Aspiration pneumonia of right lower lobe due to regurgitated food (H)    Dementia with behavioral disturbance, unspecified dementia type    Benign non-nodular prostatic hyperplasia with lower urinary tract symptoms    Benign essential hypertension    Aspiration pneumonia (H)    Atherosclerotic cerebrovascular disease    DVT Prophylaxis: Pneumatic Compression Devices  Code Status: DNR/DNI    Disposition: Expected discharge in 1-2 days once cultures indicate appropriate antibiotics and safe placement can be arranged.    Spencer Aaron MD    Interval History   Pt improving by clinical criteria.  Not requiring oxygen.  Vitals otherwise stable.  He was alter this morning, but very sleepy this afternoon.  No complaints, cultures still negative.    -Data reviewed today: I reviewed all new labs and imaging results over the last 24 hours. I personally reviewed no images or EKG's today.    Physical Exam   Temp: 97.6  F (36.4  C) Temp src: Axillary BP: 146/64 Pulse: 60 Heart Rate: 60 Resp: 18 SpO2: 92 % O2 Device: None (Room air)    Vitals:    03/17/17 1126 03/17/17 1600   Weight: 148 lb (67.1 kg) 149 lb 14.6 oz (68 kg)     Vital Signs with Ranges  Temp:  [97.5  F (36.4  C)-99.7  F (37.6  C)] 97.6  F (36.4  C)  Pulse:  [60-98] 60  Heart Rate:  [60-98] 60  Resp:  [18-20] 18  BP: (134-156)/(64-82) 146/64  SpO2:  [91 %-95 %] 92 %  I/O last 3 completed shifts:  In: 2400.58 [P.O.:540; I.V.:1860.58]  Out: 850  [Urine:850]    Constitutional: WDWN, NAD, sleeping during exam  Respiratory: CTAB  Cardiovascular: JVD noted, RRR, no MRG  GI: soft, NT, ND, no masses  Skin/Integumen: no rash  Neuro: demented  Other:     Medications        ampicillin-sulbactam (UNASYN) IV  3 g Intravenous Q6H     sodium chloride (PF)  3 mL Intracatheter Q8H     aspirin  162 mg Oral Daily     brimonidine  1 drop Left Eye BID     dorzolamide-timolol  1 drop Both Eyes BID     furosemide  20 mg Oral Daily     isosorbide mononitrate  30 mg Oral Daily     risperiDONE  1 mg Oral BID       Data   Data reviewed today:  I personally reviewed his labs.    Recent Labs  Lab 03/17/17  1125   WBC 7.3   HGB 11.5*   MCV 99         POTASSIUM 3.9   CHLORIDE 106   CO2 30   BUN 32*   CR 1.05   ANIONGAP 8   NIYA 8.9   *   ALBUMIN 2.9*   PROTTOTAL 6.0*   BILITOTAL 0.5   ALKPHOS 70   ALT 32   AST 23       No results found for this or any previous visit (from the past 24 hour(s)).

## 2017-03-18 NOTE — PLAN OF CARE
"Problem: Goal Outcome Summary  Goal: Goal Outcome Summary  Outcome: No Change  VSS. Oral temp 99.3. Denies any pain/discomfort. Pt alert and oriented to self. Pt repeatedly asks to get him \"out of here\". LS diminished. 3+ bilateral lower extremity edema noted. No complaints. Urine clear yellow with blood clots.      "

## 2017-03-18 NOTE — PLAN OF CARE
Problem: Goal Outcome Summary  Goal: Goal Outcome Summary  Outcome: Improving  S. End of shift report     B. Fall/UTI/aspiration pneumonia      A.VSS, T this shift 99.7 oral, RA. Darnell dc'd with 200cc's UOP before it was dc'd red urine. Patient ate a very good breakfast and snack however did not want any lunch despite multiple attempts to reapproach; Lungs diminished BS+ and edema to L/Extremity 3+. Patient is alert to self only.     R. Will continue to monitor per POC

## 2017-03-18 NOTE — PLAN OF CARE
Problem: Goal Outcome Summary  Goal: Goal Outcome Summary  S. End of shift report     B. UTI/sepsis     A.Patient on 1L to maintain sats above 92%. Denies Pain, abrasions/scabs to both knees. Uop this shift 350cc's Lower extremity edema 3+. Disoriented to time, situation, and place; alert to person only. Redirected and repositioned for comfort. Lungs diminished with +BS. Swallow study at bedside for nectar thick and puree solids with no straw.       R. Will continue to monitor per POC

## 2017-03-19 NOTE — CONSULTS
Reason for Referral: D/C Planning    Presenting Problem: Aspiration Pneumonia    Cognitive Behavioral Status: confused    Support system: Wife/Children    Current Living Situation:   Home Setting: unknown   Living Situation: Spouse   Function Status / Assistive Device: wheeled walker    Employment/ Financial/ Insurance Concerns: retired          No Insurance issues identified      Housing Concerns: No    Assessment: Patient lives at home with his wife.  His wife has been his caregiver.  Patient's cares with severe Dementia has become too much for the wife to handle.  He will need LTC placement.  He may be able to get some rehab services in the LTC unit when he discharges.  Family would prefer [facility] so he can stay close to his wife.  Other options would be [facility], and [facility].  Referral sent to [facility] (Admissions: [phone] Main Phone: [phone] Fax: [phone]).  They will be able to assess on Monday.    Patient currently has [facility] - RN - Silvina - [phone] - she would like to be updated on when and where patient will discharge.  She states she has been working with the wife on getting him placed for a while but they have property and she doesn't want to spend the money.    Plan: long term care facility - referral pending    NADINE Kaur  [facility] [phone]/ [place] [phone]

## 2017-03-19 NOTE — PLAN OF CARE
Problem: Goal Outcome Summary  Goal: Goal Outcome Summary  Outcome: No Change  Temp was 99.7, /81.  Pt calm, subdued, pleasant.  Fed him apple sauce.  Incontinent of large amounts of strong smelling urine.

## 2017-03-19 NOTE — PROGRESS NOTES
[facility]    Hospitalist Progress Note    Date of Service (when I saw the patient): 03/19/2017    Assessment & Plan   Bobby Hanson is a 86 year old male who was admitted on 3/17/2017. His aspiration pneumonia and urinary infection are clinically improving.  Unfortunately, urine culture is not available to guide further treatment due to lab error (unclear what this was).  His dementia is stable. Sepsis has resolved.    Active Problems:    Bacterial sepsis (H)    Urinary tract infection    Aspiration pneumonia of right lower lobe due to regurgitated food (H)    Dementia with behavioral disturbance, unspecified dementia type    Benign non-nodular prostatic hyperplasia with lower urinary tract symptoms    Benign essential hypertension    Aspiration pneumonia (H)    Atherosclerotic cerebrovascular disease    Consider obtaining another urine culture to guide antibiotic therapy (but likely won't grow anything as pt has now been on antibiotics for a few days).  When able to d/c, will change to Augmentin to complete 7-10 day course of treatment.      DVT Prophylaxis: Pneumatic Compression Devices  Code Status: DNR/DNI    Disposition: Expected discharge tomorrow if placement can be arranged.    Spencer Aaron MD    Interval History   Pt without complaints today.  Appears more alert.  No new issues reported by family or nursing staff.    -Data reviewed today: I reviewed all new labs and imaging results over the last 24 hours. I personally reviewed no images or EKG's today.    Physical Exam   Temp: 98.4  F (36.9  C) Temp src: Oral BP: 151/72 Pulse: 60 Heart Rate: 60 Resp: 20 SpO2: 90 % O2 Device: None (Room air)    Vitals:    03/17/17 1126 03/17/17 1600   Weight: 148 lb (67.1 kg) 149 lb 14.6 oz (68 kg)     Vital Signs with Ranges  Temp:  [97.6  F (36.4  C)-99.7  F (37.6  C)] 98.4  F (36.9  C)  Pulse:  [60-72] 60  Heart Rate:  [60-72] 60  Resp:  [18-20] 20  BP: (146-170)/(64-81)  151/72  SpO2:  [90 %-93 %] 90 %  I/O last 3 completed shifts:  In: 880 [P.O.:680; I.V.:200]  Out: 200 [Urine:200]    Constitutional: WDWN, NAD, confused easily  Respiratory: Upper airway sounds, no focal findings.  Cardiovascular: RRR, no MRG  GI: benign  Skin/Integumen: benign  Neuro: demented, not able to answer anything clearly that requires more than a simple yes or no.  Other:     Medications        ampicillin-sulbactam (UNASYN) IV  3 g Intravenous Q6H     sodium chloride (PF)  3 mL Intracatheter Q8H     aspirin  162 mg Oral Daily     brimonidine  1 drop Left Eye BID     dorzolamide-timolol  1 drop Both Eyes BID     furosemide  20 mg Oral Daily     isosorbide mononitrate  30 mg Oral Daily     risperiDONE  1 mg Oral BID       Data   Data reviewed today:  I personally reviewed his labs.    Recent Labs  Lab 03/19/17  0636 03/17/17  1125   WBC 10.7 7.3   HGB 11.5* 11.5*   MCV 99 99    211    144   POTASSIUM 3.5 3.9   CHLORIDE 109 106   CO2 27 30   BUN 20 32*   CR 0.85 1.05   ANIONGAP 8 8   NIYA 8.5 8.9   GLC 94 159*   ALBUMIN  --  2.9*   PROTTOTAL  --  6.0*   BILITOTAL  --  0.5   ALKPHOS  --  70   ALT  --  32   AST  --  23       No results found for this or any previous visit (from the past 24 hour(s)).

## 2017-03-20 PROBLEM — R82.81 PYURIA: Status: ACTIVE | Noted: 2017-01-01

## 2017-03-20 NOTE — DISCHARGE SUMMARY
[facility]    Discharge Summary  Hospitalist    Date of Admission:  3/17/2017  Date of Discharge:  3/20/2017  Discharging Provider: Kurtis Stoner  Date of Service (when I saw the patient): 03/20/17    Discharge Diagnoses     Aspiration pneumonia (H)    Bacterial sepsis (H)    Dementia with behavioral disturbance, unspecified dementia type    Atherosclerotic cerebrovascular disease    Pyuria    Benign non-nodular prostatic hyperplasia with lower urinary tract symptoms    Benign essential hypertension    * No resolved hospital problems. *      History of Present Illness   Bobby Hanson is an 86 year old male with dementia who presented with one-day history of worsening cough and decreased responsiveness.   Due to concerns for sepsis, aspiration pneumonia, and possible UTI, he was hospitalized. See admission history and physical for details.    Hospital Course   Bobby Hanson was admitted on 3/17/2017.  The following problems were addressed during his hospitalization:    Problem #1 probable sepsis due to aspiration pneumonia.  Chest x-ray demonstrated bilateral infiltrates at the lung bases, new compared with previously.  He did not have fever or leukocytosis, but he was initially severely hypotensive and tachypneic.  Blood cultures were negative during his hospital stay.  Nasal culture for MRSA was negative.  Lactic acid level was normal.  Urinalysis demonstrated pyuria, but urine was not sent for culture.  He was treated empirically with Unasyn and improved.  Hypotension responded to IV fluid therapy and he did not require vasopressors.  He was able to tolerate advancing diet and activity although there continued to be lingering concerns about dysphagia.  He also continued to be weak, so discharge to his TCU for an attempt at short-term rehabilitation was recommended.  He was evaluated by swallow therapy who recommended dysphagia diet and thickened liquids with reevaluation by  "swallow therapy.  After investigation, aspiration pneumonia was suspected and clinical course was consistent with probable mild sepsis.    Problem #2 advanced dementia and advanced directives.  In the context of his worsening function and advanced dementia, family expressed interest in discussing advanced directives including possible palliative or hospice care.  It was suggested that they follow-up with his outpatient care providers to further discuss advance directives while he is undergoing attempted rehabilitation at the TCU and to determine whether he would be a candidate to start hospice care on the basis of his advanced dementia, dysphagia with high risk of recurring aspiration, and ongoing functional decline.    Kurtis Stoner MD    Significant Results and Procedures   No procedures performed during this admission    Pending Results   These results will be followed up by NH provider  Unresulted Labs Ordered in the Past 30 Days of this Admission     Date and Time Order Name Status Description    3/17/2017 1143 Blood culture Preliminary     3/17/2017 1143 Blood culture Preliminary           Code Status   DNR / DNI       Primary Care Physician   Rao Lopez MD    Physical Exam   149 lbs 14.6 oz  /74 (BP Location: Right arm)  Pulse 69  Temp 97  F (36.1  C) (Oral)  Resp 20  Ht 1.626 m (5' 4\")  Wt 68 kg (149 lb 14.6 oz)  SpO2 90%  BMI 25.73 kg/m2    No acute distress, appears comfortable while resting in bed  Normal respiratory effort, diminished breath sounds at the lung bases, clear lungs  Regular rate and rhythm    Discharge Disposition   Discharged to nursing home  Condition at discharge: Stable    Consultations This Hospital Stay   SWALLOW EVAL SPEECH PATH AT BEDSIDE IP CONSULT  PHYSICAL THERAPY ADULT IP CONSULT    Time Spent on this Encounter   I, Kurtis Stoner, personally saw the patient today and spent greater than 30 minutes discharging this patient.    Discharge Orders     General " info for SNF   Length of Stay Estimate: Long Term Care  Condition at Discharge: Declining  Level of care:skilled   Rehabilitation Potential: Poor  Admission H&P remains valid and up-to-date: Yes  Recent Chemotherapy: N/A  Use Nursing Home Standing Orders: Yes     Reason for your hospital stay   Hospitalized for suspected infection and improved     Follow Up and recommended labs and tests   Follow up with Nursing home physician.     Activity - Up with nursing assistance     DNR/DNI     Physical Therapy Adult Consult   Evaluate and treat as clinically indicated.    Reason:  Weakness, sepsis     Advance Diet as Tolerated   Follow this diet upon discharge: Orders Placed This Encounter     Combination Diet Regular Diet Adult; Dysphagia Diet Level 1: Pureed; Nectar Thickened Liquids (pre-thickened or use instant food thickener)       Discharge Medications   Current Discharge Medication List      START taking these medications    Details   amoxicillin-clavulanate (AUGMENTIN) 875-125 MG per tablet Take 1 tablet by mouth 2 times daily for 5 days  Qty: 10 tablet, Refills: 0    Associated Diagnoses: Aspiration pneumonia of both lungs, unspecified aspiration pneumonia type, unspecified part of lung (H); Bacterial sepsis (H)         CONTINUE these medications which have CHANGED    Details   aspirin 81 MG tablet Take 2 tablets (162 mg) by mouth daily  Qty: 100 tablet, Refills: 3    Associated Diagnoses: Atherosclerotic cerebrovascular disease      brimonidine (ALPHAGAN) 0.2 % ophthalmic solution Place 1 drop Into the left eye 2 times daily  Qty: 1 Bottle    Associated Diagnoses: Eye disease      dorzolamide-timolol (COSOPT) 2-0.5 % ophthalmic solution Place 1 drop into both eyes 2 times daily  Qty: 1 Bottle    Associated Diagnoses: Eye disease         CONTINUE these medications which have NOT CHANGED    Details   isosorbide mononitrate (IMDUR) 30 MG 24 hr tablet TAKE ONE TABLET BY MOUTH EVERY DAY  Qty: 30 tablet, Refills: 5     Associated Diagnoses: Coronary artery disease involving native coronary artery of native heart with unstable angina pectoris (H)      risperiDONE (RISPERDAL) 1 MG tablet Take 1 tablet (1 mg) by mouth 2 times daily  Qty: 60 tablet, Refills: 3    Associated Diagnoses: Dementia with behavioral disturbance, unspecified dementia type      furosemide (LASIX) 20 MG tablet Take 1 tablet (20 mg) by mouth daily  Qty: 60 tablet, Refills: 1    Associated Diagnoses: Localized edema      tamsulosin (FLOMAX) 0.4 MG 24 hr capsule Take 1 capsule (0.4 mg) by mouth daily  Qty: 90 capsule, Refills: 1    Associated Diagnoses: Dysuria; Dementia with behavioral disturbance, unspecified dementia type; Benign non-nodular prostatic hyperplasia with lower urinary tract symptoms         STOP taking these medications       UNABLE TO FIND Comments:   Reason for Stopping:         Cholecalciferol (VITAMIN D) 2000 UNITS tablet Comments:   Reason for Stopping:             Allergies   Allergies   Allergen Reactions     No Known Drug Allergies      Data   Most Recent 3 CBC's:  Recent Labs   Lab Test  03/19/17   0636  03/17/17   1125  03/02/17   1102   WBC  10.7  7.3  8.6   HGB  11.5*  11.5*  12.4*   MCV  99  99  94   PLT  207  211  208      Most Recent 3 BMP's:  Recent Labs   Lab Test  03/19/17   0636  03/17/17   1125  03/02/17   1102   NA  144  144  144   POTASSIUM  3.5  3.9  4.0   CHLORIDE  109  106  107   CO2  27  30  31   BUN  20  32*  24   CR  0.85  1.05  0.87   ANIONGAP  8  8  6   NIYA  8.5  8.9  9.0   GLC  94  159*  111*     Most Recent 2 LFT's:  Recent Labs   Lab Test  03/17/17   1125  02/10/17   1329   AST  23  19   ALT  32  23   ALKPHOS  70  66   BILITOTAL  0.5  0.4       Most Recent 6 Bacteria Isolates From Any Culture (See EPIC Reports for Culture Details):  Recent Labs   Lab Test  03/17/17   1708  03/17/17   1325  03/17/17   1210  03/17/17   1125  03/13/14   1445   CULT  No MRSA isolated  Canceled, Test credited  Lab accident - specimen  processed incorrectly  Please collect new sample if culture still needed, DS    No growth after 3 days  No growth after 3 days  >100,000 colonies/mL Proteus mirabilis*       Results for orders placed or performed during the hospital encounter of 03/17/17   XR Chest Port 1 View    Narrative    XR CHEST PORT 1 VW 3/17/2017 11:53 AM     HISTORY: congestion, CHF    COMPARISON: 2/10/2017      Impression    IMPRESSION: Cardiac size is at the upper limits of normal and  unchanged. There is aortic calcification. Probable mild pulmonary  vascular congestion. There are bibasilar infiltrates, new since the  previous examination.    ARON GRAHAM MD

## 2017-03-20 NOTE — PLAN OF CARE
Problem: Goal Outcome Summary  Goal: Goal Outcome Summary  Outcome: No Change  Afebrile. /83. MD aware. Denies any pain/discomfort. Pt awake most of night. Pt up for a walk in halls at 0300 and then agreed to go to bed. No further complaints at this time. LS diminished throughout. Occasional nonproductive cough noted.

## 2017-03-20 NOTE — PLAN OF CARE
Patient has been accepted for placement at [facility] for today.      Writer visited with patient's wife, Ximena.  She is in agreement with placement.      Discussed transportation options.  [facility] is available today at 1330.  Wife aware of the private pay cost for transport.  Writer has notified Toyah of the d/c time for today.      1538- PAS-RR    Per [facility] regulation, CTS team completed and submitted PAS-RR to [place] [employer] Direct Connect via the [employer]. CTS team advised SNF and they are aware a PAS-RR has been submitted.     CTS team reviewed with pt or health care agent that they may be contacted for a follow up appointment within 10 days of hospital discharge if SNF stay is <30 days. Contact information for [employer] was also provided.     Pt or health care agent verbalized understanding.     PAS-RR # [id number]

## 2017-03-20 NOTE — PROGRESS NOTES
Clinic Care Coordination Contact  OUTREACH    Referral Information:  Referral Source: CTS  Transition Communication Hand-off for Care Transitions to Next Level of Care Provider     Name: Bobby Hanson  MRN #: [id number]  Primary Care Provider: Rao Lopez MD  Primary Care MD Name: John  Primary Clinic: [facility] [address]  [facility] [address]  Primary Care Clinic Name: [facility]  Reason for Hospitalization:  Aspiration pneumonia of both lungs, unspecified aspiration pneumonia type, unspecified part of lung (H) [J69.0]  Admit Date/Time: 3/17/2017 11:18 AM  Discharge Date: 3/20/17     Patient discharged to [facility] long-term care placement on 3/20/17.      [facility]   [phone]    Reason for Contact:    [facility] Requested she notify me when patient to be discharged           Plan: Will await discharge notification and outreach patient when back at home    Kelvin Luz RN  Clinic Care Coordinator  [facility]  [phone]

## 2017-03-20 NOTE — TELEPHONE ENCOUNTER
Reason for Call: Request for an order or referral:    Order or referral being requested: Hosp gave discharge orders only for PT.  He does also need OT and ST orders to be given    Date needed: as soon as possible    Has the patient been seen by the PCP for this problem? YES    Additional comments: patient was just discharged and is at the [facility]    Best Time:     Can we leave a detailed message on this number?  YES    Call taken on 3/20/2017 at 2:50 PM by Sherrie Muse

## 2017-03-20 NOTE — PROGRESS NOTES
S-(situation): Patient discharged to [facility] via wheelchair with [facility]     B-(background): Aspiration Pneumonia    A-(assessment): Alert, Disorientated to time, place and situation. VSS. Denies any pain. Up in chair for meals. Set-up.   Last bowel movement: today     R-(recommendations):Report called to [facility], message left to return call. Listed belongings gathered and sent with patient.     Discharge Nursing Criteria:     Care Plan and Patient education resolved: Yes    Core Measures   Core Measures applicable during stay (Stroke/TIA, MI, Pneumonia and SSI): NA    Vaccines  Pneumonia Vaccine verified at discharge: Yes  Influenza status verified at discharge:  Yes      MISC  Home and hospital aquired medications returned to patient: Yes  Medication Bin checked and emptied on discharge Yes  All paperwork sent with patient/Copy of AVS given to patient or family Yes.    Hernando Vega RN

## 2017-03-20 NOTE — PLAN OF CARE
Problem: Goal Outcome Summary  Goal: Goal Outcome Summary  Outcome: Improving  T. 98.4 99.5 this shift /72 and 171/73, HR 60-70's patient ambulated in the halls with 2 assist and gait belt; patient is not oriented and can be redirected. Fair appetite and fluids offered and sitting upright however patient still has small bouts of coughing. Lungs diminished. Incontinent with 6 soaked briefs. Pills crushed and put in applesauce. Will continue to monitor per POC

## 2017-03-20 NOTE — PROGRESS NOTES
Bobby Hanson  Gender: male  : 1931  [address]  [facility] [address]  [phone] (home)     Medical Record: [id number]  Pharmacy:    [facility] [place], [place] - [address] [place] [address] PHARMACY  Primary Care Provider: Rao Lopez    Parent's names are: Data Unavailable (mother) and Data Unavailable (father).      [facility]  2017    Discharge Phone Call:  Key Words/Key Times  Discharged to KAMAR Vega RN

## 2017-03-21 PROBLEM — N17.9 ACUTE KIDNEY INJURY (H): Status: RESOLVED | Noted: 2017-01-01 | Resolved: 2017-01-01

## 2017-03-21 PROBLEM — I95.9 HYPOTENSION, UNSPECIFIED HYPOTENSION TYPE: Status: RESOLVED | Noted: 2017-01-01 | Resolved: 2017-01-01

## 2017-03-21 PROBLEM — R13.10 DYSPHAGIA: Status: ACTIVE | Noted: 2017-01-01

## 2017-03-21 PROBLEM — N40.1 BENIGN PROSTATIC HYPERPLASIA WITH LOWER URINARY TRACT SYMPTOMS: Status: RESOLVED | Noted: 2017-01-01 | Resolved: 2017-01-01

## 2017-03-21 PROBLEM — R31.29 MICROSCOPIC HEMATURIA: Status: RESOLVED | Noted: 2017-01-01 | Resolved: 2017-01-01

## 2017-03-21 PROBLEM — A41.9 BACTERIAL SEPSIS (H): Status: RESOLVED | Noted: 2017-01-01 | Resolved: 2017-01-01

## 2017-03-21 PROBLEM — R79.89 ELEVATED TROPONIN: Status: RESOLVED | Noted: 2017-01-01 | Resolved: 2017-01-01

## 2017-03-21 PROBLEM — R00.1 BRADYCARDIA: Status: RESOLVED | Noted: 2017-01-01 | Resolved: 2017-01-01

## 2017-03-21 NOTE — PROGRESS NOTES
[place] [facility]  PRIMARY CARE PROVIDER AND CLINIC:  [facility] [address]  / [facility] [address]  Chief Complaint   Patient presents with     Hospital F/U       HPI:    Bobby Hanson is a 86 year old  (1/31/1931),admitted to the [facility]   from [facility].  Hospital stay 3/17/17 through 3/20/17 for increase weakness.  Work up + for sepsis, aspiration pneumonia and UTI. Admitted to this facility for  rehab, medical management and nursing care. Current issues are on going weakness and not feeling well despite being on ABX for several days.    Aspiration pneumonia of both lungs, unspecified aspiration pneumonia type, unspecified part of lung (H)  Cont augmentin -   Started Alb nebs by me yesterday via phone message - having low grade temps at SNF since admission    Dysphagia, unspecified type  ST eval/treat    Dementia with behavioral disturbance, unspecified dementia type  nemenda is a home medication per wife and per 12/'16 ED note but no mention of it on this ED stay and did not get ordered at SNF. Per nsg wife would want him on it.   On risperidal - no mention of what psychosis we are trying to treat.     Benign non-nodular prostatic hyperplasia with lower urinary tract symptoms  Wife brought in home meds list   On flomax and proscar  No mention of proscarn t/o epic and unclear when/wehre this was started - may have been [facility]. She does have the bottle and therefore I am confident she is an accurate      Atherosclerotic cerebrovascular disease  On 162 ASA BID -  But ordered q day from ER    Pyuria  Noted possible UTI but appear UC was canceled and not tested.     Advanced directives, counseling/discussion  POLST filled out with wife/facility staff -     Coronary artery disease involving native heart, angina presence unspecified, unspecified vessel or lesion type  On ASA and imdur  imdur appears to have  been started in 12/16 ER stay - no angina symptoms. - had + trop    Localized edema  On lasix for johnny lower ext edema  Noted last CXR also showed some pul edema (likely from IVF)      CODE STATUS/ADVANCE DIRECTIVES DISCUSSION:   DNR / DNI  Patient's living condition: did live with spouse, but now will be LTC    ALLERGIES:No known drug allergies  PAST MEDICAL HISTORY:  has a past medical history of Caregiver burden (8/31/2016) and Hypertension.  PAST SURGICAL HISTORY:  has a past surgical history that includes APPENDECTOMY and REMOVE TONSILS/ADENOIDS,<13 Y/O.  FAMILY HISTORY: family history includes Arthritis in his paternal grandmother; C.A.D. in his mother and sister; DIABETES in his sister; GASTROINTESTINAL DISEASE in his sister and sister; Lung Cancer in his father; Prostate Cancer in his father.  SOCIAL HISTORY:  reports that he quit smoking about 65 years ago. He smoked 0.00 packs per day for 6.00 years. He has never used smokeless tobacco. He reports that he drinks alcohol. He reports that he does not use illicit drugs.    Post Discharge Medication Reconciliation Status: discharge medications reconciled, continue medications without change.  Current Outpatient Prescriptions   Medication Sig Dispense Refill     albuterol (2.5 MG/3ML) 0.083% neb solution Take 1 vial by nebulization 3 times daily Also BID PRN       Acetaminophen (TYLENOL PO) Take 650 mg by mouth every 4 hours as needed for mild pain or fever       FINASTERIDE PO Take 5 mg by mouth daily       aspirin 81 MG tablet Take 2 tablets (162 mg) by mouth daily 100 tablet 3     brimonidine (ALPHAGAN) 0.2 % ophthalmic solution Place 1 drop Into the left eye 2 times daily 1 Bottle      dorzolamide-timolol (COSOPT) 2-0.5 % ophthalmic solution Place 1 drop into both eyes 2 times daily 1 Bottle      amoxicillin-clavulanate (AUGMENTIN) 875-125 MG per tablet Take 1 tablet by mouth 2 times daily for 5 days 10 tablet 0     isosorbide mononitrate (IMDUR) 30 MG 24 hr  tablet TAKE ONE TABLET BY MOUTH EVERY DAY 30 tablet 5     risperiDONE (RISPERDAL) 1 MG tablet Take 1 tablet (1 mg) by mouth 2 times daily 60 tablet 3     tamsulosin (FLOMAX) 0.4 MG 24 hr capsule Take 1 capsule (0.4 mg) by mouth daily 90 capsule 1       ROS:  Unobtainable secondary to cognitive impairment or aphasia, but today pt reports feeling weak    Exam:  /73  Pulse 100  Temp 97.7  F (36.5  C)  Resp 24  SpO2 90%  GENERAL APPEARANCE:  Alert, in no distress  RESP:  respiratory effort and palpation of chest normal, auscultation of lungs diminished with some scattered ronchi - no wheeze , no respiratory distress  CV:  Palpation and auscultation of heart done , rate and rhythm reg, no murmur, no  peripheral edema  ABDOMEN:  normal bowel sounds, soft, nontender, no hepatosplenomegaly or other masses  M/S:   Gait and station unsteady - weak, Digits and nails normal  SKIN:  Inspection and Palpation of skin and subcutaneous tissue intact  PSYCH:  insight and judgement, memory poor , affect and mood normal      Lab/Diagnostic data:     WBC   Date Value Ref Range Status   03/19/2017 10.7 4.0 - 11.0 10e9/L Final   ]  Hemoglobin   Date Value Ref Range Status   03/19/2017 11.5 (L) 13.3 - 17.7 g/dL Final   03/17/2017 11.5 (L) 13.3 - 17.7 g/dL Final   ]  Last Basic Metabolic Panel:  Lab Results   Component Value Date     03/19/2017      Lab Results   Component Value Date    POTASSIUM 3.5 03/19/2017     Lab Results   Component Value Date    NIYA 8.5 03/19/2017     Lab Results   Component Value Date    CO2 27 03/19/2017     Lab Results   Component Value Date    BUN 20 03/19/2017     Lab Results   Component Value Date    CR 0.85 03/19/2017     Lab Results   Component Value Date    GLC 94 03/19/2017       ASSESSMENT/PLAN:  Aspiration pneumonia of both lungs, unspecified aspiration pneumonia type, unspecified part of lung (H)/Dysphagia, unspecified type  Cont ABX/ST eval and treat  Started ALb nebs and O2 by me  Cont to  monitor  W/u for other illness given ongoing/worseing symptoms since SNF admission  - will do influenza swab    Dementia with behavioral disturbance, unspecified dementia type  Restarted namenda - slower ramp up given has been off since at least hospitalization  Will eventually talk to wife about benefit of med vs stopping.   Also future try to do dose reduction on resperidal. - monitor for psychosis.     Benign non-nodular prostatic hyperplasia with lower urinary tract symptoms  Restart proscar. Cont flomax    Atherosclerotic cerebrovascular disease  Keep lower dose of ASA    Pyuria  Noted at risk for UTI with bacteria not covered by Augmentin  will collect UC>     Advanced directives, counseling/discussion  POLST filled out, signed and sent to scanner     Coronary artery disease involving native heart, angina presence unspecified, unspecified vessel or lesion type  Cont with lower 162 asa qday   Cont imudur and consider dose decrease if lower bp's.     Localized edema  DC lasix as drinking less and risk  For low fluid        Orders:  1.  Influenza A & B swab now. - came back + A -   Cr CL 58.9  - going as 60 so treat with 75 mg po BID x 5 days - order called to facility at 8:25 pm  2.  Start Menantine 10 mg po QD x 5 days, then BID.  Dx: dementia  3.  Start Finasteride 5 mg po QHS.  Dx: prostate enlargement   4.  D/c Lasix as at risk for low fluid given lower po  Intake.   5. Send UC    Call to wife and message left.       Information reviewed:  Medications, vital signs, orders, nursing notes, problem list, hospital information. Total time spent with patient visit was 35 min including patient visit, review of past records and phone call to patient contact. Greater than 50% of total time spent with counseling and coordinating care.    Electronically signed by:  PROSPER Del Rosario CNP

## 2017-03-22 NOTE — TELEPHONE ENCOUNTER
Pt taking a turn for the worse -   More weak - not taking oral meds well.   Staff nurse called wife - will move to comfort measures.   Will start atropine gtts  roxanol 20- mg/ml 5 mg po q 4 hrs prn  Ativan 0.5 mg po q 4 hrs prn.

## 2017-03-24 NOTE — PROGRESS NOTES
[place] [facility]    Chief Complaint   Patient presents with     Nursing Home Acute       HPI:    Bobby Hanson is a 86 year old  (1/31/1931), who is being seen today for an episodic care visit at [facility]. New pt to SNF - s/p living at home with chronic weakness 2/2 dementia with actue pneumonia - hospitalization then to sNF with + influenza - soon started to appear as if failing and comfort measures started, but now pt doing fairly. Eating/drinking minimal/moderate amounts - up in chair. Today's concern is f/u on dementia with psychosis - on resperidial and since snf stay no paranoia or psychoisis noted.       REVIEW OF SYSTEMS:  Unobtainable secondary to cognitive impairment or aphasia.    BP (!) 87/55  Pulse 64  Temp 97.6  F (36.4  C)  Resp 16  Wt 148 lb 14.4 oz (67.5 kg)  SpO2 99%  BMI 25.56 kg/m2  GENERAL APPEARANCE:  Alert, in no distress- masked 2/2 + influenza  Frail, weak appearing,     ASSESSMENT/PLAN:  Dementia with behavioral disturbance, unspecified dementia type  Will try dose reduction:    Change Risperidone to 1 mg po Q pm and 1 mg po Q am PRN. 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

## 2017-03-27 NOTE — PROGRESS NOTES
[place] [facility]    Chief Complaint   Patient presents with     Nursing Home Acute       HPI:    Bobby Hanson is a 86 year old  (1/31/1931), who is being seen today for an episodic care visit at [facility]. Today's concern is:  Dementia with behavioral disturbance, unspecified dementia type  Wean down on resperidal last wek - f/u today pt stable and no acute signs of delusions/psychosis. Talking to wife that med was started as pt was resistant for cares at home.     Aspiration pneumonia of both lungs, unspecified aspiration pneumonia type, unspecified part of lung (H)  Now off abx and working with ST -     Dysphagia, unspecified type  ST working with pt - belives chronic aspiration with all liquids.      REVIEW OF SYSTEMS:  Unobtainable secondary to cognitive impairment or aphasia.    /71  Pulse 68  Temp 96.6  F (35.9  C)  Resp 18  Wt 140 lb 12.8 oz (63.9 kg)  SpO2 90%  BMI 24.17 kg/m2  GENERAL APPEARANCE:  Alert, in no distress, very frail - fairly no meaningful responses today but more awake than before  LS care clear - non labored.   CV trace johnny lower ext edema - warm   Good veins - does not appear fluid volume down.     ASSESSMENT/PLAN:  Dementia with behavioral disturbance, unspecified dementia type  Will cont with medication wean - discussed with family    Aspiration pneumonia of both lungs, unspecified aspiration pneumonia type, unspecified part of lung (H)/Dysphagia, unspecified type  At risk for on going pneumonia - discussed with family - dgt and wfie about risks and what ST eval -       1.  D/c albuterol nebs - scheduled, keep PRN. - nsg states pt gets more anxious with med  2.  D/c Morphine as no longer needed - does not appear end of life now.   3.  D/c scheduled Risperdal. - keep prn - montior - goal to DC prn soon.   4.  Decrease Namenda to 10 mg po QD x 5 days, then d/c.  Dx: dementia    Talked to daughter DEON and wife adelia about above changes and current poc/medical  condition - will continue to [facility]. Total time spent with patient visit was 25 min including patient visit, review of past records and discussions iwht dgt and wife. . Greater than 50% of total time spent with counseling and coordinating care.      PROSPER Del Rosario CNP

## 2017-04-11 NOTE — PROGRESS NOTES
Received call from Roxy at [facility]. Patient is now in LTC.    Plan: No further CC involvement.    Kelvin Luz RN  Clinic Care Coordinator  [facility]  [phone]

## 2017-04-11 NOTE — PROGRESS NOTES
[place] [facility]    Chief Complaint   Patient presents with     Nursing Home Acute       HPI:    Bobby Hanson is a 86 year old  (1/31/1931), who is being seen today for an episodic care visit at [facility]. Today's concern is:  Coronary artery disease involving native heart, angina presence unspecified, unspecified vessel or lesion type  On imdur and having trouble swallowing given dyphagia  med started in ER - 12/2016 after found to have sinus sunny.- no c/o CP/angina  F/u work up included EKG, echo, neuc stress test, lexican stress and Zio patch montior. Noted sterss nuc study did show possible ischedmia   and cards f/u 2/27/2017    Dysphagia, unspecified type  Having difficulty swallowing pills    Loss of weight  Wife concerend - HNS started yesterday  Initially came pt was quite ill and wasn't able to eat  Now on pureed food and thicken liquids  Wt Readings from Last 4 Encounters:   04/11/17 126 lb 11.2 oz (57.5 kg)   03/27/17 140 lb 12.8 oz (63.9 kg)   03/24/17 148 lb 14.4 oz (67.5 kg)   03/17/17 149 lb 14.6 oz (68 kg)     Dementia with behavioral disturbance, unspecified dementia type  Advanced dementia - progressive  Talked to wife about progressive disease.   Came on risperidal - weaned down to prn in tcu - no use  Also weaned down to  off namenda as was having hard time taking when acutely ill.     Thrush  On tongue    REVIEW OF SYSTEMS:  Unobtainable secondary to cognitive impairment or aphasia.    PMH/PSH reviewed in EPIC today      /87  Pulse 68  Temp 97.6  F (36.4  C)  Resp 12  Wt 126 lb 11.2 oz (57.5 kg)  SpO2 94%  BMI 21.75 kg/m2  GENERAL APPEARANCE:  Alert, in no distress  Up ambulating indep - shuffel gait  LS clear r/o - no distress  CV - no edema in legs, HRRR    ASSESSMENT/PLAN:  Coronary artery disease involving native heart, angina presence unspecified, unspecified vessel or lesion type  Will dc imdur - use prn nitro if needed.     Dysphagia, unspecified type  Cont  poc    Loss of weight  Cont poc with supplements    Dementia with behavioral disturbance, unspecified dementia type  Discussion with wife about disease   Will dc prn risperdal 2/2 no use/no need  Keep off namenda as likely no benefit.     Thrush  Nystatin tongue washes.       1.  D/c PRN Risperdal.  2.  D/c Imdur due to dysphagia - can't swallow. -if any symptomatic CP use PAT PRN nitro.   3.  D/c PRN Albuterol nebs.  4.  Check BP's daily alternating times x 7 days and update me with results.  5.  Change eye gtts to BID - nursing can change time for easy administration.     Total time spent with patient visit was 25 min including patient visit, review of past records and phone call to patient contact. Greater than 50% of total time spent with counseling and coordinating care.      Rabia Puri, PROSPER CNP

## 2017-04-15 NOTE — TELEPHONE ENCOUNTER
CXR results show right lower lobe opacity  Patient is stable at this time  He did get rocephin 1gm IM this AM  Order:   Give 1gm rocephin IM tomorrow morning and Monday morning  Update NP on monday

## 2017-04-15 NOTE — TELEPHONE ENCOUNTER
Patient has Hx of end stage dementia, has had some discussions with Primary NP regarding hospice  Patient has a fever, cough, congestions, SOB other vss  Family at facility this AM stating they want treatment at facility but no hospitalization  Orders:   CBC with diff, BMP, CXR  Rocephin 1gm IM now  Continue to monitor

## 2017-04-17 PROBLEM — J18.9 HEALTHCARE-ASSOCIATED PNEUMONIA: Status: ACTIVE | Noted: 2017-01-01

## 2017-04-17 PROBLEM — G93.40 ENCEPHALOPATHY: Status: ACTIVE | Noted: 2017-01-01

## 2017-04-17 PROBLEM — J18.9 PNEUMONIA: Status: ACTIVE | Noted: 2017-01-01

## 2017-04-17 PROBLEM — J96.21 ACUTE ON CHRONIC RESPIRATORY FAILURE WITH HYPOXIA (H): Status: ACTIVE | Noted: 2017-01-01

## 2017-04-17 PROBLEM — N30.00 ACUTE CYSTITIS WITHOUT HEMATURIA: Status: ACTIVE | Noted: 2017-01-01

## 2017-04-17 NOTE — PROGRESS NOTES
"SPIRITUAL HEALTH SERVICES  SPIRITUAL ASSESSMENT Progress Note  [facility]      PRIMARY FOCUS:     Emotional/spiritual/Sabianism distress - Charge nurse reported pt was gravely ill, family requesting interventions.    Support for coping    ILLNESS CIRCUMSTANCES:     Reviewed documentation.     Context of Serious Illness/Symptoms - pneumonia, dementia.    Resources for Support - Family, niurka.  Several family were present.    DISTRESS:     Emotional/Spiritual/Existential Distress -  saw pt and his grandson in pt's room and provided a prayer.  Then  visited pt's, wife, two daughters and other family in the Family Room.  Daughter, Amaris, mentioned \"they had almost lost him on Saturday, but he rallied, so they are pursuing interventions.    Samaritan Distress - Not Applicable    Social/Cultural/Economic Distress - Not Discussed    SPIRITUAL/Confucianism COPING:     Yazidi/Niurka - Yarsanism.  Wife reported  [facility] [place] [facility].    Spiritual Practice - Pt indicated he wanted a prayer, so  provided it.    GOALS OF CARE:    Goals of Care - Address pneumonia    Meaning/Sense-Making - Not Discussed    PLAN:  will follow up tomorrow.    Jackson Wilkinson M.Div., [facility]  Staff   Office tel: [phone]    "

## 2017-04-17 NOTE — ED NOTES
Pt changed over from a nasal cannula to an oxymask at 5 LPM. Pt continues to sleep.  Daughters at bedside.

## 2017-04-17 NOTE — CONSULTS
Pharmacy Vancomycin Initial Note  Date of Service 2017  Patient's  1931  86 year old, male    Indication: Healthcare-Associated Pneumonia    Current estimated CrCl = Estimated Creatinine Clearance: 21.2 mL/min (based on Cr of 1.95).    Creatinine for last 3 days  4/15/2017: 11:45 AM Creatinine 2.44 mg/dL  2017: 11:03 PM Creatinine 1.95 mg/dL    Recent Vancomycin Level(s) for last 3 days  No results found for requested labs within last 72 hours.      Vancomycin IV Administrations (past 72 hours)                   vancomycin 1250 mg in 0.9% NaCl 250 mL PREMIX (mg) 1,250 mg New Bag 17 0046                Nephrotoxins and other renal medications (Future)    Start     Dose/Rate Route Frequency Ordered Stop    17 2331  vancomycin 1250 mg in 0.9% NaCl 250 mL PREMIX      1,250 mg Intravenous EVERY 48 HOURS 17 2330      17 2322  piperacillin-tazobactam (ZOSYN) infusion 2.25 g      2.25 g  100 mL/hr over 30 Minutes Intravenous EVERY 6 HOURS 17 2322            Contrast Orders - past 72 hours     None                Plan:  1.  Start vancomycin  1250 mg IV q48h.   2.  Goal Trough Level: 15-20 mg/L   3.  Pharmacy will check trough levels as appropriate in 1-3 Days.    4. Serum creatinine levels will be ordered daily for the first week of therapy and at least twice weekly for subsequent weeks.    5. [place] method utilized to dose vancomycin therapy: Method 1    Marv Cutler

## 2017-04-17 NOTE — H&P
[facility]    History and Physical  Hospitalist       Date of Admission:  4/16/2017  Date of Service (when I saw the patient): 04/17/17    Assessment & Plan   Bobby Hanson is a 86 year old male who presents with increased shortness of breath associated with dropping oxygen levels with fever, poor oral intake. He is a resident at [facility], after recent hospitalization for aspiration pneumonia. He has known dementia, and recently has not been eating or drinking very much which is limited by a dysphagia diet. Chest x-ray yesterday indicated a pneumonia and he was treated with IM Rocephin but today is more confused with a fever and requiring supplemental oxygen at a higher rate. On arrival to the emergency department, he flagged for sepsis with his lactic acid being elevated at 2.3. He is febrile and is requiring supplemental oxygen. Lab work is showing a normal WBC. Sodium is elevated at 160 and chest x-ray is showing a right lower lobe infiltrate. Troponin is mildly elevated, likely due to renal status, nothing to suggest any new cardiac symptoms. Patient will be admitted to inpatient status for treatment of healthcare associated pneumonia with secondary sepsis. Daughters and wife are here and understand the seriousness of the condition. Reviewed his advance directives and they are saying that he would want treatment with IV antibiotics and fluids. He is otherwise DNR, DNI. Will admit to the medical floor and hopefully will see improvement. Recheck labs including the lactic acid this coming morning. Will attempt to get a urine sample and cultures of blood and sputum are pending.    Active Problems:    Sepsis (H)    Assessment: presenting with change in mental status, fever with elevated lactic acid and elevated CRP. Chest x-ray indicating a right lower lobe pneumonia.    Plan: IV fluids having given. Because of elevated sodium, will switch over to lactate ringers. Continue to  monitor for urine output and will recheck labs this coming a.m. He is a broad-spectrum antibiotics with cultures of blood and sputum pending. Discussed with family the seriousness of his illness. He will be admitted to the hospital floor. If he worsens, they would like to be apprised to make a decision about more aggressive treatment including possible pressers    Healthcare-associated pneumonia    Assessment: coming from rehab center, known history of previous aspiration pneumonia.    Plan: broad-spectrum antibiotics with vancomycin, Levaquin and Zosyn with cultures pending.    Dementia with behavioral disturbance, unspecified dementia type    Assessment: worsening with short-term memory loss. Still able to recognize family members at time. Has had his Namenda recently stopped because it wasn't helping    Plan: monitor. Family understands that this is a non-treatable condition.    Dysphagia    Assessment: previously seen by speech language therapy with this. Was on a parade diet, recently changed back to mechanical soft because of not eating very well. He continues in thickened liquids, nectar consistency    Plan: when able, restart dysphagia diet    Acute on chronic respiratory failure with hypoxia (H)    Assessment: requiring oxygen to maintain stats above 92%. Oftentimes will use oxygen at night for sleeping. No previous history of COPD    Plan: oxygen supplementation to maintain stats above 92% with nasal cannula, mask. Family is clear that he is do not intubate     Encephalopathy    Assessment: likely due to sepsis.    Plan: monitor  DVT Prophylaxis: Pneumatic Compression Devices  Code Status: DNR / DNI    Disposition: Expected discharge in 2 to 3 days days once improving.    Myke Pathak MD    Primary Care Physician   Rao Lopez MD    Chief Complaint   86-year-old male coming from nursing home with fever and change in mental status    History is obtained from the electronic health record,  emergency department physician and patient daughters    History of Present Illness   Bobby Hanson is a 86 year old male who presents with change in mental status with a fever. He is currently at rehab in [facility], recovering from recent hospitalization for aspiration pneumonia. He has issues with dysphagia, currently in a dysphagia diet with mechanical soft foods with thickened liquids. Over the past month he's not been eating or drinking well and has been declining. Over the past few days family is noted that he is looking worse and yesterday had a fever and increased cough. Chest x-ray indicated a pneumonia and he was given a dose of IM Rocephin. Today with fever and change in mental status. He is requiring increased oxygen to maintain oxygen saturation. Because of this, he was transferred to the emergency department for evaluation. Patient has underlying dementia, currently not taking anything. He is able to recognize family members. Tonight he is not waking up very well and he is not able to give any history. Family's notes that his mouth has been very dry. He has been gurgling at times with the nursing home giving atropine drops. He is not complained to family about chest pain or abdominal pain. There has been no vomiting, no diarrhea. There are no new sores or redness of skin.    Past Medical History    I have reviewed this patient's medical history and updated it with pertinent information if needed.   Past Medical History:   Diagnosis Date     Caregiver burden 8/31/2016    strongly advised placement into nursing care facility as his dementia worsens.      Hypertension        Past Surgical History   I have reviewed this patient's surgical history and updated it with pertinent information if needed.  Past Surgical History:   Procedure Laterality Date     C APPENDECTOMY       HC REMOVE TONSILS/ADENOIDS,<11 Y/O         Prior to Admission Medications   Prior to Admission Medications   Prescriptions Last Dose  Informant Patient Reported? Taking?   Acetaminophen (TYLENOL PO) 4/14/2017 at 1614  Yes No   Sig: Take 650 mg by mouth every 4 hours as needed for mild pain or fever   FINASTERIDE PO 4/14/2017 at 1900  Yes No   Sig: Take 5 mg by mouth daily   LORazepam (ATIVAN PO) 4/16/2017 at 1800  Yes Yes   Sig: Take 0.5 mg by mouth every 4 hours as needed for anxiety   acetaminophen (TYLENOL) 650 MG Suppository 4/16/2017 at 2140  Yes Yes   Sig: Place 650 mg rectally every 4 hours as needed for fever   aspirin 81 MG tablet 4/14/2017 at 1100  No No   Sig: Take 2 tablets (162 mg) by mouth daily   atropine 1 % ophthalmic solution 4/16/2017 at 0030  Yes Yes   Sig: Take 2 drops by mouth every hour as needed   brimonidine (ALPHAGAN) 0.2 % ophthalmic solution 4/16/2017 at 0600  No Yes   Sig: Place 1 drop Into the left eye 2 times daily   cefTRIAXone (ROCEPHIN) 1 GM vial 4/16/2017 at 1315  Yes Yes   Sig: Inject 1,000 mg into the vein daily Daily for 3 days   dorzolamide-timolol (COSOPT) 2-0.5 % ophthalmic solution 4/16/2017 at 0800  No Yes   Sig: Place 1 drop into both eyes 2 times daily   nystatin (MYCOSTATIN) 171645 UNIT/ML suspension   Yes Yes   Sig: Take 100,000 Units by mouth 4 times daily as needed   tamsulosin (FLOMAX) 0.4 MG 24 hr capsule 4/14/2017 at 1900  No No   Sig: Take 1 capsule (0.4 mg) by mouth daily      Facility-Administered Medications: None     Allergies   Allergies   Allergen Reactions     No Known Drug Allergies        Social History   I have reviewed this patient's social history and updated it with pertinent information if needed. Bobby GOMEZ Inezshi  reports that he quit smoking about 65 years ago. He smoked 0.00 packs per day for 6.00 years. He has never used smokeless tobacco. He reports that he drinks alcohol. He reports that he does not use illicit drugs.    Family History   I have reviewed this patient's family history and updated it with pertinent information if needed.   Family History   Problem Relation Age of  Onset     C.A.D. Mother      had rheumatic fever as a child     C.A.D. Sister      had MI  had bi pass--another sister had heart operation.      DIABETES Sister       at age 81     Prostate Cancer Father       at age 59     Lung Cancer Father      Arthritis Paternal Grandmother      GASTROINTESTINAL DISEASE Sister      GASTROINTESTINAL DISEASE Sister      esophogus ulcer       Review of Systems   Review of systems not obtained due to patient factors - confusion    Physical Exam   Temp: 98.7  F (37.1  C) Temp src: Temporal BP: 122/57 Pulse: 80 Heart Rate: 75 Resp: 30 SpO2: 98 % O2 Device: Nasal cannula Oxygen Delivery: 5 LPM  Vital Signs with Ranges  Temp:  [98.7  F (37.1  C)] 98.7  F (37.1  C)  Pulse:  [80] 80  Heart Rate:  [75-77] 75  Resp:  [20-30] 30  BP: (122-139)/(57-87) 122/57  SpO2:  [91 %-98 %] 98 %  119 lbs 0 oz    EXAM:  Constitutional: lying on his right side. He responds somewhat to voice by grimacing, but will not open his eyes and actively fighting any type of evaluation.   Cardiovascular: regular rate and rhythm, no murmur heard. No peripheral edema.  Respiratory: lung to auscultation are clear  Psychiatric: lying, eyes shut. Really not responding and noncooperative. Does withdraw the pain.  Head: no signs of trauma   Neck: no signs of masses.  ENT: mouth is very dry.   Abdomen: soft, no masses. Not eliciting any tenderness  NEURO: unable to test.  SKIN: no suspicious lesions or rashes  LYMPH: Normal cervical lymph nodes  JOINT/EXTREMITIES: extremities normal- no gross deformities noted and normal muscle tone     Data   Data reviewed today:  I personally reviewed the EKG tracing showing Sinus rhythm, no acute changes and the chest x-ray image(s) showing Appears to have a right lower lobe infiltrate..    Recent Labs  Lab 17  2303 04/15/17  1145   WBC 5.1 7.2   HGB 14.8 14.1   MCV 96 96    136*   * 156*   POTASSIUM 3.5 3.8   CHLORIDE 118* 116*   CO2 31 31   BUN 75* 56*    CR 1.95* 2.44*   ANIONGAP 11 9   NIYA 10.4* 9.6   * 128*   ALBUMIN 2.7*  --    PROTTOTAL 6.7*  --    BILITOTAL 1.1  --    ALKPHOS 79  --    ALT 31  --    AST 25  --    TROPI 0.602*  --        Recent Results (from the past 24 hour(s))   XR Chest Port 1 View    Narrative    CHEST SINGLE VIEW PORTABLE   4/17/2017 12:02 AM     HISTORY: Pneumonia.    COMPARISON: 3/17/2017.    FINDINGS: Small region of hazy opacities in the right lung. The lungs  are otherwise clear. Borderline cardiomegaly again noted.  Atherosclerotic calcification in the thoracic aorta.      Impression    IMPRESSION: A small region of hazy opacities in the right lung base,  more prominent than on 3/17/2017. This is nonspecific, but most likely  represents pneumonia. A followup chest radiograph could be performed  in a few weeks to confirm resolution.    FREDA ALMANZRA MD

## 2017-04-17 NOTE — CONSULTS
Care Transition Initial Assessment - RN        Met with: Patient and Family.    DATA   Active Problems:    Dementia with behavioral disturbance, unspecified dementia type    Sepsis (H)    Dysphagia    Healthcare-associated pneumonia    Acute on chronic respiratory failure with hypoxia (H)    Encephalopathy    Pneumonia    Acute cystitis without hematuria       Cognitive Status: awake and disoriented.        Contact information and PCP information verified: Yes    Lives With: facility resident                      Insurance concerns: No Insurance issues identified    ASSESSMENT  Patient currently receives the following services:  Lives at [facility]        Identified issues/concerns regarding health management:  will discuss with family about how they would like patient treated POLST form steers toward keeping comfortable may use IV fluids and antibiotics.      PLAN  Financial costs for the patient include .  Patient given options and choices for discharge Family wants patient to return to [facility].  Patient/family is agreeable to the plan?  Yes: Will wait for Dr Stoner to round on patient   Patient anticipates discharging to [facility].        Patient anticipates needs for home equipment: No    Plan/Disposition: LTC    Appointments: Will be made before discharge   JAYLON Ortega CTS RN    Care  (CTS) will continue to follow as needed.

## 2017-04-17 NOTE — PROGRESS NOTES
[facility] Progress Note         Assessment and Plan:   Assessment:   Active Problems:    Healthcare-associated pneumonia    Assessment: history of aspiration, prior skilled nursing facility    Plan: Will continue vancomycin, zosyn per family request. Blood cultures still pending    Acute on chronic respiratory failure with hypoxia (H)    Assessment: Oxymask at 3 LPM, spo2 94%    Plan: Continue supplemental oxygen as needed.   Dementia with behavioral disturbance, unspecified dementia type    Assessment: worsening, but does communicate needs with family at times. Likely end stage dementia.     Plan: Monitor    Sepsis (H)    Assessment: Improved, lactic acid down to 1.5.     Plan: Continue vancomycin, Zosyn for HCAP /aspiration pneumonia    Dysphagia    Assessment:     Plan: Keep NPO, consider orals when sodium has normalized, another swallow eval has been performed.     Acute cystitis with Hematuria    Assessment: on vancomycin, Zoysn    Plan: await culture results                    Interval History:   About the same today.  Vitals signs stable.  Temp 99.9 Remains NPO. Discussed care with family today, discussed comfort cares, dehydration, continued aspiration risk. They discussed the possibility of withdrawing IV antibiotics and fluids.        Review of Systems:   CONSTITUTIONAL:POSITIVE  for weight loss  INTEGUMENTARY/SKIN: ulceration buttocks   ENT/MOUTH: previous thrush  RESP:NEGATIVE for significant cough or SOB and cough-non productive  PSYCHIATRIC: impaired memory          Medications:     Current Facility-Administered Medications   Medication     acetaminophen (TYLENOL) Suppository 650 mg     brimonidine (ALPHAGAN) 0.2 % ophthalmic solution 1 drop     dorzolamide-timolol (COSOPT) ophthalmic solution 1 drop     naloxone (NARCAN) injection 0.1-0.4 mg     albuterol neb solution 2.5 mg     dextrose 5% and 0.45% NaCl infusion     lidocaine 1 % 1 mL     lidocaine (LMX4) kit     sodium chloride (PF) 0.9% PF  flush 3 mL     sodium chloride (PF) 0.9% PF flush 3 mL     piperacillin-tazobactam (ZOSYN) infusion 2.25 g     [START ON 4/18/2017] levofloxacin (LEVAQUIN) infusion 500 mg     vancomycin 1250 mg in 0.9% NaCl 250 mL PREMIX             Physical Exam:   Vitals were reviewed  Constitutional:   somnolent and no apparent distress     ENT:   normocepalic, without obvious abnormality, atramatic, gingival hypertrophy, dry and brown crusting mucosa, brown crust on  tongue     Lungs:   no increased work of breathing, decreased air exchange and rhonchi diffuse, apnic episodes noted.      Cardiovascular:   regular rate and rhythm     Abdomen:   normal bowel sounds, soft and tenderness noted diffusely     Musculoskeletal:   no lower extremity pitting edema present             Data:     Results for orders placed or performed during the hospital encounter of 04/16/17 (from the past 24 hour(s))   Influenza A/B antigen   Result Value Ref Range    Influenza A/B Agn Specimen Nose     Influenza A Negative NEG    Influenza B  NEG     Negative   Test results must be correlated with clinical data. If necessary, results   should be confirmed by a molecular assay or viral culture.     CBC with platelets differential   Result Value Ref Range    WBC 5.1 4.0 - 11.0 10e9/L    RBC Count 4.72 4.4 - 5.9 10e12/L    Hemoglobin 14.8 13.3 - 17.7 g/dL    Hematocrit 45.4 40.0 - 53.0 %    MCV 96 78 - 100 fl    MCH 31.4 26.5 - 33.0 pg    MCHC 32.6 31.5 - 36.5 g/dL    RDW 15.2 (H) 10.0 - 15.0 %    Platelet Count 155 150 - 450 10e9/L    Diff Method Automated Method     % Neutrophils 78.1 %    % Lymphocytes 11.2 %    % Monocytes 8.1 %    % Eosinophils 1.8 %    % Basophils 0.4 %    % Immature Granulocytes 0.4 %    Absolute Neutrophil 4.0 1.6 - 8.3 10e9/L    Absolute Lymphocytes 0.6 (L) 0.8 - 5.3 10e9/L    Absolute Monocytes 0.4 0.0 - 1.3 10e9/L    Absolute Eosinophils 0.1 0.0 - 0.7 10e9/L    Absolute Basophils 0.0 0.0 - 0.2 10e9/L    Abs Immature Granulocytes  0.0 0 - 0.4 10e9/L   Comprehensive metabolic panel   Result Value Ref Range    Sodium 160 (H) 133 - 144 mmol/L    Potassium 3.5 3.4 - 5.3 mmol/L    Chloride 118 (H) 94 - 109 mmol/L    Carbon Dioxide 31 20 - 32 mmol/L    Anion Gap 11 3 - 14 mmol/L    Glucose 127 (H) 70 - 99 mg/dL    Urea Nitrogen 75 (H) 7 - 30 mg/dL    Creatinine 1.95 (H) 0.66 - 1.25 mg/dL    GFR Estimate 33 (L) >60 mL/min/1.7m2    GFR Estimate If Black 40 (L) >60 mL/min/1.7m2    Calcium 10.4 (H) 8.5 - 10.1 mg/dL    Bilirubin Total 1.1 0.2 - 1.3 mg/dL    Albumin 2.7 (L) 3.4 - 5.0 g/dL    Protein Total 6.7 (L) 6.8 - 8.8 g/dL    Alkaline Phosphatase 79 40 - 150 U/L    ALT 31 0 - 70 U/L    AST 25 0 - 45 U/L   Lactic acid whole blood   Result Value Ref Range    Lactic Acid 2.3 (H) 0.7 - 2.1 mmol/L   Troponin I   Result Value Ref Range    Troponin I ES 0.602 (HH) 0.000 - 0.045 ug/L   Blood culture   Result Value Ref Range    Specimen Description Right Arm     Special Requests 30 DH     Culture Micro No growth after 10 hours     Micro Report Status Pending    CRP inflammation   Result Value Ref Range    CRP Inflammation 207.0 (H) 0.0 - 8.0 mg/L   Nt probnp inpatient (BNP)   Result Value Ref Range    N-Terminal Pro BNP Inpatient 889 0 - 1800 pg/mL   Blood culture   Result Value Ref Range    Specimen Description Left Arm     Special Requests 30 TH     Culture Micro No growth after 10 hours     Micro Report Status Pending    Pharmacy to dose vancomycin    Narrative    Marv Cutler, [facility]     2017  3:07 AM  Pharmacy Vancomycin Initial Note  Date of Service 2017  Patient's  1931  86 year old, male    Indication: Healthcare-Associated Pneumonia    Current estimated CrCl = Estimated Creatinine Clearance: 21.2   mL/min (based on Cr of 1.95).    Creatinine for last 3 days  4/15/2017: 11:45 AM Creatinine 2.44 mg/dL  2017: 11:03 PM Creatinine 1.95 mg/dL    Recent Vancomycin Level(s) for last 3 days  No results found for requested labs  within last 72 hours.      Vancomycin IV Administrations (past 72 hours)                   vancomycin 1250 mg in 0.9% NaCl 250 mL PREMIX (mg) 1,250 mg New   Bag 04/17/17 0046                Nephrotoxins and other renal medications (Future)    Start     Dose/Rate Route Frequency Ordered Stop    04/16/17 2331  vancomycin 1250 mg in 0.9% NaCl 250 mL PREMIX      1,250 mg Intravenous EVERY 48 HOURS 04/16/17 2330      04/16/17 2322  piperacillin-tazobactam (ZOSYN) infusion 2.25 g        2.25 g  100 mL/hr over 30 Minutes Intravenous EVERY 6 HOURS 04/16/17 2322              Contrast Orders - past 72 hours     None                Plan:  1.  Start vancomycin  1250 mg IV q48h.   2.  Goal Trough Level: 15-20 mg/L   3.  Pharmacy will check trough levels as appropriate in 1-3 Days.      4. Serum creatinine levels will be ordered daily for the first   week of therapy and at least twice weekly for subsequent weeks.    5. [place] method utilized to dose vancomycin therapy: Method 1    Marv Cutler     XR Chest Port 1 View    Narrative    CHEST SINGLE VIEW PORTABLE   4/17/2017 12:02 AM     HISTORY: Pneumonia.    COMPARISON: 3/17/2017.    FINDINGS: Small region of hazy opacities in the right lung. The lungs  are otherwise clear. Borderline cardiomegaly again noted.  Atherosclerotic calcification in the thoracic aorta.      Impression    IMPRESSION: A small region of hazy opacities in the right lung base,  more prominent than on 3/17/2017. This is nonspecific, but most likely  represents pneumonia. A followup chest radiograph could be performed  in a few weeks to confirm resolution.    FREDA ALMANZAR MD   Social Work IP Consult    Narrative    Catia Ortega RN     4/17/2017  1:23 PM  Care Transition Initial Assessment - RN        Met with: Patient and Family.    DATA   Active Problems:    Dementia with behavioral disturbance, unspecified dementia type    Sepsis (H)    Dysphagia    Healthcare-associated pneumonia    Acute on  chronic respiratory failure with hypoxia (H)    Encephalopathy    Pneumonia    Acute cystitis without hematuria       Cognitive Status: awake and disoriented.        Contact information and PCP information verified: Yes    Lives With: facility resident                      Insurance concerns: No Insurance issues identified    ASSESSMENT  Patient currently receives the following services:  Lives at   [facility]        Identified issues/concerns regarding health management:  will   discuss with family about how they would like patient treated   POLST form steers toward keeping comfortable may use IV fluids   and antibiotics.      PLAN  Financial costs for the patient include .  Patient given options and choices for discharge Family wants   patient to return to [facility].  Patient/family is agreeable to the plan?  Yes: Will wait for Dr Stoner to round on patient   Patient anticipates discharging to [facility].        Patient anticipates needs for home equipment: No    Plan/Disposition: LTC    Appointments: Will be made before discharge   JAYLON Ortega CTS RN    Care  (CTS) will continue to follow as   needed.       UA reflex to Microscopic and Culture   Result Value Ref Range    Color Urine Deena     Appearance Urine Cloudy     Glucose Urine 50 (A) NEG mg/dL    Bilirubin Urine Negative NEG    Ketones Urine Negative NEG mg/dL    Specific Gravity Urine 1.019 1.003 - 1.035    Blood Urine Large (A) NEG    pH Urine 5.0 5.0 - 7.0 pH    Protein Albumin Urine 100 (A) NEG mg/dL    Urobilinogen mg/dL 0.0 0.0 - 2.0 mg/dL    Nitrite Urine Negative NEG    Leukocyte Esterase Urine Small (A) NEG    Source Unspecified Urine     RBC Urine >182 (H) 0 - 2 /HPF    WBC Urine 115 (H) 0 - 2 /HPF    Bacteria Urine Few (A) NEG /HPF    Squamous Epithelial /HPF Urine 1 0 - 1 /HPF    Mucous Urine Present (A) NEG /LPF    Hyaline Casts 14 (H) 0 - 2 /LPF   Basic metabolic panel   Result Value Ref Range    Sodium 162 (HH) 133  - 144 mmol/L    Potassium 3.7 3.4 - 5.3 mmol/L    Chloride 126 (H) 94 - 109 mmol/L    Carbon Dioxide 28 20 - 32 mmol/L    Anion Gap 8 3 - 14 mmol/L    Glucose 130 (H) 70 - 99 mg/dL    Urea Nitrogen 61 (H) 7 - 30 mg/dL    Creatinine 1.67 (H) 0.66 - 1.25 mg/dL    GFR Estimate 39 (L) >60 mL/min/1.7m2    GFR Estimate If Black 47 (L) >60 mL/min/1.7m2    Calcium 8.9 8.5 - 10.1 mg/dL   Lactic acid whole blood   Result Value Ref Range    Lactic Acid 1.5 0.7 - 2.1 mmol/L   Care Coordinator IP Consult    Catia Lovell RN     4/17/2017  1:23 PM  Care Transition Initial Assessment - RN        Met with: Patient and Family.    DATA   Active Problems:    Dementia with behavioral disturbance, unspecified dementia type    Sepsis (H)    Dysphagia    Healthcare-associated pneumonia    Acute on chronic respiratory failure with hypoxia (H)    Encephalopathy    Pneumonia    Acute cystitis without hematuria       Cognitive Status: awake and disoriented.        Contact information and PCP information verified: Yes    Lives With: facility resident                      Insurance concerns: No Insurance issues identified    ASSESSMENT  Patient currently receives the following services:  Lives at   [facility]        Identified issues/concerns regarding health management: Dr will   discuss with family about how they would like patient treated   POLST form steers toward keeping comfortable may use IV fluids   and antibiotics.      PLAN  Financial costs for the patient include .  Patient given options and choices for discharge Family wants   patient to return to [facility].  Patient/family is agreeable to the plan?  Yes: Will wait for Dr Stoner to round on patient   Patient anticipates discharging to [facility].        Patient anticipates needs for home equipment: No    Plan/Disposition: LTC    Appointments: Will be made before discharge   JAYLON Ortega CTS RN    Care  (CTS) will continue to follow as    needed.         All imaging studies reviewed by me.    Attestation:  I have reviewed today's vital signs, notes, medications, labs and imaging.     Clint Vicente PA-C

## 2017-04-17 NOTE — ED PROVIDER NOTES
History     Chief Complaint   Patient presents with     Generalized Weakness     HPI  Bobby Hanson is a 86 year old male who presents to the emergency department today via EMS from the nursing home.  Patient was diagnosed with pneumonia at the nursing home yesterday and was started on IM Rocephin.  He shouldn't since that time has slowly been declining and today has not been eating, he became short of breath and had a fever and was requiring supplemental oxygen so he is brought here for further evaluation.  According to nursing staff, 5 days ago patient was completely independent, 2 days later, he was requiring 2 staff members to help with any of his ADLs.  It wasn't until yesterday that he was diagnosed with a pneumonia.  Patient arrives here nonverbal, he is mildly tachypneic, he is hypoxic.  Patient is normotensive, he is not tachycardic.  Temperature on arrival here is 98.7.  Patient did receive 650 mg of rectal Tylenol at 9:00 this evening.  Patient does arrive here with a signed POLST, he is DNR/DNI.  Patient's daughter reports that he was admitted in March for an aspiration pneumonia.      I have reviewed the Medications, Allergies, Past Medical and Surgical History, and Social History in the Epic system.    Review of Systems   Constitutional: Positive for activity change, appetite change and fever.   HENT: Positive for congestion.    Respiratory: Positive for cough and shortness of breath.    Skin: Positive for pallor.   Neurological: Positive for weakness.   All other systems reviewed and are negative.      Physical Exam   BP: 139/78  Pulse: 80  Temp: 98.7  F (37.1  C)  Resp: 24  Weight: 54 kg (119 lb)  SpO2: 92 %  Physical Exam   Constitutional: He appears well-developed.   Pale, in distress, ill-appearing male   HENT:   Head: Normocephalic.   Mouth/Throat: No oropharyngeal exudate.   Dry mucous membranes  Overall poor dentition, torus palantinus on upper palate   Eyes: Conjunctivae and EOM are normal.  Pupils are equal, round, and reactive to light. Right eye exhibits no discharge. Left eye exhibits no discharge.   Neck: Normal range of motion.   Cardiovascular: Normal rate, normal heart sounds and intact distal pulses.    Pulmonary/Chest: He exhibits no tenderness.   Mildly tachypneic  Course lung sounds throughout   Abdominal: Soft. Bowel sounds are normal. He exhibits no distension and no mass. There is no tenderness.   Musculoskeletal: Normal range of motion. He exhibits edema (Trace peripheral edema bilateral lower extremities).   Lymphadenopathy:     He has no cervical adenopathy.   Skin: There is pallor.   Clammy   Psychiatric:   Nonverbal       ED Course    2320-Lactic Acid is 2.3.  Additional liter of NS ordered.  Patient started on HCA antibiotics including Zosyn, Levaquin, and Vancomycin.     ED Course     Procedures         EKG Interpretation:      Interpreted by Rosangela Conroy  Time reviewed: 2315  Symptoms at time of EKG: Dyspnea   Rhythm: normal sinus   Rate: Normal  Axis: Normal  Ectopy: premature ventricular contractions (unifocal)  Conduction: normal  ST Segments/ T Waves: No ST-T wave changes and No acute ischemic changes  Q Waves: nonspecific  Comparison to prior: Unchanged from 3/17/17  Clinical Impression: no acute changes    Results for orders placed or performed during the hospital encounter of 04/16/17   CBC with platelets differential   Result Value Ref Range    WBC 5.1 4.0 - 11.0 10e9/L    RBC Count 4.72 4.4 - 5.9 10e12/L    Hemoglobin 14.8 13.3 - 17.7 g/dL    Hematocrit 45.4 40.0 - 53.0 %    MCV 96 78 - 100 fl    MCH 31.4 26.5 - 33.0 pg    MCHC 32.6 31.5 - 36.5 g/dL    RDW 15.2 (H) 10.0 - 15.0 %    Platelet Count 155 150 - 450 10e9/L    Diff Method Automated Method     % Neutrophils 78.1 %    % Lymphocytes 11.2 %    % Monocytes 8.1 %    % Eosinophils 1.8 %    % Basophils 0.4 %    % Immature Granulocytes 0.4 %    Absolute Neutrophil 4.0 1.6 - 8.3 10e9/L    Absolute Lymphocytes 0.6  (L) 0.8 - 5.3 10e9/L    Absolute Monocytes 0.4 0.0 - 1.3 10e9/L    Absolute Eosinophils 0.1 0.0 - 0.7 10e9/L    Absolute Basophils 0.0 0.0 - 0.2 10e9/L    Abs Immature Granulocytes 0.0 0 - 0.4 10e9/L   Comprehensive metabolic panel   Result Value Ref Range    Sodium 160 (H) 133 - 144 mmol/L    Potassium 3.5 3.4 - 5.3 mmol/L    Chloride 118 (H) 94 - 109 mmol/L    Carbon Dioxide 31 20 - 32 mmol/L    Anion Gap 11 3 - 14 mmol/L    Glucose 127 (H) 70 - 99 mg/dL    Urea Nitrogen 75 (H) 7 - 30 mg/dL    Creatinine 1.95 (H) 0.66 - 1.25 mg/dL    GFR Estimate 33 (L) >60 mL/min/1.7m2    GFR Estimate If Black 40 (L) >60 mL/min/1.7m2    Calcium 10.4 (H) 8.5 - 10.1 mg/dL    Bilirubin Total 1.1 0.2 - 1.3 mg/dL    Albumin 2.7 (L) 3.4 - 5.0 g/dL    Protein Total 6.7 (L) 6.8 - 8.8 g/dL    Alkaline Phosphatase 79 40 - 150 U/L    ALT 31 0 - 70 U/L    AST 25 0 - 45 U/L   Lactic acid whole blood   Result Value Ref Range    Lactic Acid 2.3 (H) 0.7 - 2.1 mmol/L   Troponin I   Result Value Ref Range    Troponin I ES 0.602 (HH) 0.000 - 0.045 ug/L   CRP inflammation   Result Value Ref Range    CRP Inflammation 207.0 (H) 0.0 - 8.0 mg/L   Nt probnp inpatient (BNP)   Result Value Ref Range    N-Terminal Pro BNP Inpatient 889 0 - 1800 pg/mL   Influenza A/B antigen   Result Value Ref Range    Influenza A/B Agn Specimen Nose     Influenza A Negative NEG    Influenza B  NEG     Negative   Test results must be correlated with clinical data. If necessary, results   should be confirmed by a molecular assay or viral culture.         Assessments & Plan (with Medical Decision Making)  Bobby is an 86-year-old male history of dementia who presents to the emergency department today from the [facility] for evaluation of pneumonia.  Please refer to HPI and focused exam.  Patient on exam is quite ill-appearing, he is hypoxic, he is dyspneic, he has coarse lung sounds.  He arrives here afebrile and normotensive with a heart rate of 80.  Patient is on supplemental  "oxygen to keep his saturation above 90%.  Patient is currently on 5 L.  Concerns for sepsis given recent pneumonia diagnosis decline in status over the last several hours.  Daughter is at bedside and requests that patient be treated for his current infection, she did confirm that he is a DNR/DNI but reports \"he continues to want interventions\".  Peripheral IV was established and patient was given a liter of fluid here.  Blood work was evaluated, CBC returns with a normal white count of 5.1 and a hemoglobin of 14.8, it is quite possible the patient's white count is normal given his 2 days of Rocephin.  Comprehensive metabolic panel returns with a sodium of 160, a chloride of 118, glucose of 127, BUN of 75, creatinine of 1.96, GFR 33, calcium of 10.4, and albumin at 2.7, and a protein of 6.7.  Lactic acid returned elevated at 2.3.  CRP is elevated at 207.  Troponin is elevated at 0.602.  BNP is within normal limits.  Influenza swab was obtained and is negative.  Blood cultures are pending.  EKG is negative for any acute ischemic findings.  Patient was given a 2nd liter of normal saline, he remains normotensive.  Patient remains nonverbal.  Patient was started on broad-spectrum antibiotics specific to healthcare associated coverage, it is possible that this is a repeat aspiration pneumonia, however, given his decline in status and the fact that he resides in the nursing home, I will cover him for aspiration pneumonia as well as broader coverage for healthcare acquired pneumonia.  Patient will be admitted for sepsis and pneumonia as well as elevated troponin. Patient accepted by our hospitalist Dr. Pathak.  Patient staffed with Dr. Tellez.    This is a shared visit note.  I also evaluated the patient and discussed with Milena Conroy the evaluation and care of the patient. I had a long conversation with his spouse regarding the exam findings and planned care of the patient.  Patient's wife is made aware of the seriousness " of his illness. His POLST orders were acknowledged and will be honored.  I'm in agreement with the above evaluation and plan of care.  Electronically signed, Juan Tellez DO       I have reviewed the nursing notes.    I have reviewed the findings, diagnosis, plan and need for follow up with the patient.    New Prescriptions    No medications on file       Final diagnoses:   Sepsis, due to unspecified organism (H)   Pneumonia of right lower lobe due to infectious organism   Elevated troponin       4/16/2017   [facility] EMERGENCY DEPARTMENT     Rosangela Conroy, PROSPER CNP  04/17/17 0033       Juan Tellez,   04/17/17 0325

## 2017-04-17 NOTE — ED NOTES
LATE ENTRY DUE TO PT CARE:    Pt arrived by EMS.  Pt has been sick the last few days and developed a fever today.  Two IV's started and labs collected.  EKG completed.  Portable chest xray completed.  Critical troponin value of 0.602 called to writer by lab, reported to provider.  Family present.  Pt has two open areas about dime size and are red, one on each buttocks.  Pt has a healing wound on the left knee.  Changed pt's brief, loose stool.  Pt is on 5 LPM of oxygen via nasal cannula.

## 2017-04-17 NOTE — PROGRESS NOTES
Called into the room from family, noted heart rate low on pulse oximeter.  Did note heart rate 80 on pulse ox and 76 with auscultation of apical pulse. While in the room with him, noted periods of apnea and heart rate did also get very slow into the 40's with apneic spells of 20 seconds or more.  Patient is noted to be DNR/DNI,  Pulse oximeter now reads 95% on 3 l oxiplus mask.  Condom cath leaking, deal cath bag emptied for 180 cc dark tea colored urine.

## 2017-04-17 NOTE — PLAN OF CARE
Problem: Goal Outcome Summary  Goal: Goal Outcome Summary  Outcome: No Change  Patient had condom cath on this morning but was leaking around it into his brief with some in drainage bag as well.  After about the third brief change, condom cath would not stay on.  Did have conversation with provider about placing deal catheter for better I and O measurement, no orders at that time.  See previous note as well about the heart rates dipping into 40's with 20 second apneic spells, also discussed with the provider.  Patient frequently states he needs to void or have bowel movements, have had him up to bedside commode with assist of 2 without any BM results, although have had BM smears in brief.  Family members are concerned about heart rate dipping, states patient had a cardiology consult back in December of 2016, wife apparently would like [facility] going forward if it is needed.  One daughter (Amaris) expressed concerns about the heart rate and why not get a pacer now if it is indicated.  Discussed with her I wasn't sure that was necessary at this time but the provider would be the one to ask questions of.

## 2017-04-17 NOTE — PHARMACY-VANCOMYCIN DOSING SERVICE
Bobby Hanson is a 86 year old male, Vancomycin dose: 1250 mg IV Q48H  Indication: Healthcare-Associated Pneumonia  Day of Therapy: 2  Renal Function: Improving  Goal Trough Level: 15-20 mg/L  Current Trough Level: n/a    Plan: Continue Current Dose  Will continue to follow daily and check levels as appropriate in 1-3 Days.    Silvestre Hooker, [facility]

## 2017-04-17 NOTE — PROGRESS NOTES
S-(situation): Patient arrives to room 265 via cart from ED    B-(background): Pt declining x5 days. Being treated for pneumonia at NH. To ED with increased SOB and elevated temp.     A-(assessment): VSS. Afebrile. Denies any pain/discomfort. LS crackles throughout. Urine dark tea colored. No complaints.    R-(recommendations): Orders reviewed with patient and daughters. Will monitor patient per MD orders.     Inpatient nursing criteria listed below were met:    Health care directives status obtained and documented: Yes  Core Measures assessed (SSI): Yes  SCD's Documented: Yes  Vaccine assessment done and vaccines ordered if appropriate: Yes  Skin issues/needs documented:Yes  Isolation needs addressed, if appropriate: Yes  Fall Prevention: Care plan updated, Education given and documented Yes  MRSA swab completed for patient 55 years and older (exclude CARLITOS and TKA): Yes  My Chart patient sign up addressed and documented: Yes  Care Plan initiated: Yes  Education Assessment documented:Yes  Education Documented (Pre-existing chronic infection such as, MRSA/VRE need education on admission): Yes  New medication patient education completed and documented (Possible Side Effects of Common Medications handout): Yes  Home medications if not able to send immediately home with family stored here: NA   Reminder note placed in discharge instructions: NA  Discharge planning review completed (admission navigator) Yes

## 2017-04-17 NOTE — ED NOTES
Patient diagnosed with pneumonia yesterday. He has a fever, not eating, possible mental status change.

## 2017-04-17 NOTE — ED NOTES
ED Nursing criteria listed below was addressed during verbal handoff:     Abnormal vitals: Yes  Abnormal results: Yes  Med Reconciliation completed: Yes  Meds given in ED: Yes  Any Overdue Meds: N/A  Core Measures: N/A  Isolation: N/A, he has been in droplet in the ED  Special needs: Yes  Skin assessment: Yes    Observation Patient  Education provided: N/A    Report given to Gabrielle Dwyer.  Pt to room 265.

## 2017-04-17 NOTE — ED NOTES
Called and spoke with nursing staff and [facility] in [place], Med list will be updated and they reported that 5 days ago he was up and walking independent and in the past 3 days has gotten to where two staff have to help him and are not able to get him out of bed, he is not eating and drinking, neb and oxygen orders were new today

## 2017-04-17 NOTE — ED NOTES
Daughter concerned about pt's palate, stating that it looks like a turtle shell.  She stated that he has had thrush and is concerned it is a complication from that.  Provider in to assess.  Blood pressure 99/58, spoke with provider, additional IV fluid ordered.  Dr Pathak in to admit pt.  Pt has been turned from laying on his left side to his right, pillow between his knees.  Pt has not communicated verbally with staff.  Daughter states that he has used one word response, not always understandable.

## 2017-04-18 PROBLEM — E87.0 HYPERNATREMIA: Status: ACTIVE | Noted: 2017-01-01

## 2017-04-18 NOTE — PROGRESS NOTES
"CLINICAL NUTRITION SERVICES  -  ASSESSMENT NOTE      RECOMMENDATIONS FOR MD/PROVIDER TO ORDER:   None   Recommendations Ordered by Registered Dietitian (RD):   None   Future/Additional Recommendations:   -Awaiting SLP Evaluation to make further recommendations   Malnutrition:   Severe malnutrition in context of: Acute illness or injury        REASON FOR ASSESSMENT  Bobby Hanson is a 86 year old male seen by Registered Dietitian for Patient/Family request.     NUTRITION HISTORY  Information obtained from chart and patient rounding. Noted pt presented to ED with SOB associated with dropping oxygen levels with fever and poor oral intake. Pt has dx of dementia, sepsis, dysphagia, pneumonia, acute on chronic respiratory failure, encephalopathy, acute cystitis without hematuria and lower urinary tract symptoms. Daughter mentioned she noticed pt's pallet swelling a few days ago and asked MD to look at soon. Noted pt was not eating or drinking very much recently limited by dysphagia diet. Previously seen by speech language therapy. Noted was on a pureed diet, then changed back to mechanical soft because of not eating very well, was also on thickened liquids, nectar consistency. Currently, pt is NPO status d/t dysphagia. Noted comfort care and dehydration discussion with family. Noted plan is to keep NPO, consider orals when sodium has normalized, until another swallow eval has been performed. After swallow evaluation has been completed, direction of nutritional care will be assessed.    CURRENT NUTRITION ORDERS  Diet Order:     NPO     Current Intake/Tolerance:  0% (NPO), poor oral intake prior to NPO status    PHYSICAL FINDINGS  Observed  No nutrition-related physical findings observed  Obtained from Chart/Interdisciplinary Team  Pressure Ulcers- buttocks    ANTHROPOMETRICS  Height: 5' 4\"  Weight: 121 lbs 8 oz  Body mass index is 20.86 kg/(m^2).  Weight Status:  Normal BMI  IBW: 130 lbs  % IBW: 93%  Weight History:   Wt " Readings from Last 10 Encounters:   04/17/17 55.1 kg (121 lb 8 oz)   04/11/17 57.5 kg (126 lb 11.2 oz)   03/27/17 63.9 kg (140 lb 12.8 oz)   03/24/17 67.5 kg (148 lb 14.4 oz)   03/17/17 68 kg (149 lb 14.6 oz)   03/07/17 67.1 kg (148 lb)   03/02/17 65.8 kg (145 lb)   02/27/17 67.9 kg (149 lb 9.6 oz)   02/15/17 66.3 kg (146 lb 1.6 oz)   02/10/17 63.5 kg (139 lb 15.9 oz)   **Loss of 19% BW over past month    LABS  Labs reviewed  GFR (L): 33, 39  Lactic Acid: 2.3 (H), 1.5  Sodium (H): 160, 162  Creat (H): 1.95, 1.67    MEDICATIONS  Medications reviewed  IFV @ 100 mL/hr    Dosing Weight 59.1 kg (IBW)    ASSESSED NUTRITION NEEDS (PER APPROVED PRACTICE GUIDELINES):  Estimated Energy Needs: 0932-6820 kcals (30-35 Kcal/Kg)  Justification: maintenance and underweight   Estimated Protein Needs: 71-89 grams protein (1.2-1.5 g pro/Kg)  Justification: maintenance, hypercatabolism with acute illness and preservation of lean body mass  Estimated Fluid Needs: 9591-4602  mL (1 mL/Kcal)  Justification: maintenance    MALNUTRITION:  % Weight Loss:  > 5% in 1 month (severe malnutrition)  % Intake:  </= 50% for >/= 5 days (severe malnutrition)  Subcutaneous Fat Loss:  None observed  Muscle Loss:  None observed  Fluid Retention:  None noted    Malnutrition Diagnosis: Severe malnutrition  In Context of:  Acute illness or injury    NUTRITION DIAGNOSIS:  Inadequate protein-energy intake related to decreased ability to consume sufficient energy and dysphagia as evidenced by loss of 19% BW over past month, estimated energy intake is less than recommended levels and recent pneumonia infection.    NUTRITION INTERVENTIONS  Recommendations / Nutrition Prescription   -Awaiting SLP Evaluation to make further recommendations    Implementation  Nutrition education: No education needs assessed at this time    Nutrition Goals  Intake of meals/snacks/supplements > 50% once diet is advanced.    MONITORING AND EVALUATION:  Progress towards goals will be  monitored and evaluated per protocol and Practice Guidelines      Carlyn Cornejo RD, LD  Clinical Dietitian  [phone]

## 2017-04-18 NOTE — PROGRESS NOTES
Clinical Impressions: Severe oropharyngeal dysphagia characterized by s/s of aspiration with nectar thick liquids and pudding thick solids.   Recommend: NPO. Consult with family regarding safest diet versus least restrictive diet.    Patient demonstrates severe oropharyngeal dysphagia characterized by s/s of aspiration with nectar thick liquids and pudding thick solids. Safest diet is NPO (nothing per oral). Unable to determine safest AND least restrictive diet at this time clinically. Least restrictive diet is likely puree solids and honey thick liquids by teaspoon, however, due to poor ability to follow instructions and inability to initiate swallow during this evaluation, these textures could not be evaluated for safety. Recommend consult with family regarding safety versus quality of life regarding PO intake.    Oral mechanism:  Patient demonstrated poor comprehension of directions during oral mechanism exam. Reduced lingual and labial range of motion noted, especially lingual protrusion and lateral movement. Patient was unable to sustain air pressure in buccal cavity for more than two seconds independently. Unable to assess dentition due to poor language comprehension.   Swallow study: SLP presented one quarter teaspoon pudding texture for assessment. Patient did not use his labial muscles to remove the pudding from the spoon. He let the spoon sit in the anterior portion of his mouth on the bottom of orbicularis oris and did not scrape the pudding despite max verbal cues. SLP removed the spoon from his oral cavity and gave verbal cues to close mouth and swallow. Patient was unable to follow instructions. Video swallow was terminated at this point and patient was returned to his room. SLP discussed VFSS procedure and recommendations with Dr. Finnegan and stated that the safest diet is NPO and least restrictive is likely puree solids and honey thick liquids by teaspoon.          04/18/17 [facility]   General  Information   Onset Date 04/16/17   Start of Care Date 04/18/17   Referring Physician Dr. Finnegan   Patient/Family Goals Statement To get better   Swallowing Evaluation Bedside swallow evaluation   Behaviorial Observations Confused;Lethargic   Mode of current nutrition NPO   Respiratory Status O2 Supply   Comments See H&P, patient admitted with aspiration pneumonia for second time. PMH significant for dementia.   Clinical Swallow Evaluation   Oral Musculature unable to assess due to poor participation/comprehension   Structural Abnormalities other (see comments)  (unable to assess)   Dentition other (see comments)  (unable to assess, previous slp note present and adequate)   Mucosal Quality sticky   Mandibular Strength and Mobility impaired   Oral Labial Strength and Mobility impaired retraction;impaired pursing;impaired seal;impaired coordination   Lingual Strength and Mobility impaired protrusion;impaired anterior elevation;impaired left lateral movement;impaired right lateral movement;impaired coordination   Velar Elevation other (see comments)  (unable to assess)   Buccal Strength and Mobility other (see comments)  (unable to assess)   Laryngeal Function Cough;Throat clear;Voicing initiated;Dry swallow palpated   Oral Musculature Comments Difficulties assessing due to confusion and inability to follow clinical instructions   Clinical Swallow Eval: Puree Solid Texture Trial   Mode of Presentation, Puree spoon   Volume of Puree Presented 1   Oral Phase, Puree other (see comments)  (unable to assess)   Pharyngeal Phase, Puree other (see comments)  (unable to assess)   Oral Residue, Puree other (see comments)  (unable to assess)   Successful Strategies Trialed During Procedure, Puree other (see comments)  (unable to assess)   Diagnostic Statement Patient demonstrates severe oropharyngeal dysphagia characterized by s/s of aspiration with nectar thick liquids and pudding thick solids. Safest diet is NPO (nothing per  oral). Unable to determine safest AND least restrictive diet at this time clinically. Least restrictive diet is likely puree solids and honey thick liquids by teaspoon, however, due to poor ability to follow instructions and inability to initiate swallow during this evaluation, these textures could not be evaluated for safety. Recommend consult with family regarding safety versus quality of life regarding PO intake.   Swallow Eval: Clinical Impressions   Skilled Criteria for Therapy Intervention No significant expected  improvement in functional status   Functional Assessment Scale (FAS) 1   Dysphagia Outcome Severity Scale (VEENA) Level 1 - VEENA   Treatment Diagnosis Severe oropharyngeal dysphagia   Diet texture recommendations NPO   Anticipated Discharge Disposition extended care facility   Risks and Benefits of Treatment have been explained. No  (unable to discuss with patient due to low comprehension)   Patient, family and/or staff in agreement with Plan of Care Other (see comments)  (family not present)   Clinical Impression Comments Patient demonstrates severe oropharyngeal dysphagia characterized by s/s of aspiration with nectar thick liquids and pudding thick solids. Safest diet is NPO (nothing per oral). Unable to determine safest AND least restrictive diet at this time clinically. Least restrictive diet is likely puree solids and honey thick liquids by teaspoon, however, due to poor ability to follow instructions and inability to initiate swallow during this evaluation, these textures could not be evaluated for safety. Recommend consult with family regarding safety versus quality of life regarding PO intake.   Total Evaluation Time   Total Evaluation Time (Minutes) 30   Jayesh Martinez MS, CCC-SLP

## 2017-04-18 NOTE — PLAN OF CARE
Problem: Goal Outcome Summary  Goal: Goal Outcome Summary  Clinical Impressions: Severe oropharyngeal dysphagia characterized by s/s of aspiration with nectar thick liquids and pudding thick solids.   Recommend: NPO. Consult with family regarding safest diet versus least restrictive diet.     Patient demonstrates severe oropharyngeal dysphagia characterized by s/s of aspiration with nectar thick liquids and pudding thick solids. Safest diet is NPO (nothing per oral). Unable to determine safest AND least restrictive diet at this time clinically. Least restrictive diet is likely puree solids and honey thick liquids by teaspoon, however, due to poor ability to follow instructions and inability to initiate swallow during this evaluation, these textures could not be evaluated for safety. Recommend consult with family regarding safety versus quality of life regarding PO intake.     Oral mechanism:  Patient demonstrated poor comprehension of directions during oral mechanism exam. Reduced lingual and labial range of motion noted, especially lingual protrusion and lateral movement. Patient was unable to sustain air pressure in buccal cavity for more than two seconds independently. Unable to assess dentition due to poor language comprehension.   Swallow study: SLP presented one quarter teaspoon pudding texture for assessment. Patient did not use his labial muscles to remove the pudding from the spoon. He let the spoon sit in the anterior portion of his mouth on the bottom of orbicularis oris and did not scrape the pudding despite max verbal cues. SLP removed the spoon from his oral cavity and gave verbal cues to close mouth and swallow. Patient was unable to follow instructions. Video swallow was terminated at this point and patient was returned to his room. SLP discussed VFSS procedure and recommendations with Dr. Finnegan and stated that the safest diet is NPO and least restrictive is likely puree solids and honey thick  liquids by teaspoon.      Jayesh Martinez MS, CCC-SLP

## 2017-04-18 NOTE — PHARMACY-VANCOMYCIN DOSING SERVICE
Pharmacy Vancomycin Note  Date of Service 2017  Patient's  1931   86 year old, male    Indication: Healthcare-Associated Pneumonia  Goal Trough Level: 15-20 mg/L  Day of Therapy: 3  Current Vancomycin regimen:  1250 mg IV q48h    Current estimated CrCl = Estimated Creatinine Clearance: 24.7 mL/min (based on Cr of 1.67).    Creatinine for last 3 days  4/15/2017: 11:45 AM Creatinine 2.44 mg/dL  2017: 11:03 PM Creatinine 1.95 mg/dL  2017:  5:40 AM Creatinine 1.67 mg/dL    Recent Vancomycin Levels (past 3 days)  No results found for requested labs within last 72 hours.    Vancomycin IV Administrations (past 72 hours)                   vancomycin 1250 mg in 0.9% NaCl 250 mL PREMIX (mg) 1,250 mg New Bag 17 0046                Nephrotoxins and other renal medications (Future)    Start     Dose/Rate Route Frequency Ordered Stop    17 2331  vancomycin 1250 mg in 0.9% NaCl 250 mL PREMIX      1,250 mg Intravenous EVERY 48 HOURS 17 2330      17 2322  piperacillin-tazobactam (ZOSYN) infusion 2.25 g      2.25 g  100 mL/hr over 30 Minutes Intravenous EVERY 6 HOURS 17 2322               Contrast Orders - past 72 hours     None          Interpretation of levels and current regimen:    Has serum creatinine changed > 50% in last 72 hours: No    Urine output:  good urine output    Renal Function: Stable    Plan:  1.  Continue Current Dose  2.  Pharmacy will check trough levels as appropriate in 1-3 Days.    3. Serum creatinine levels will be ordered daily for the first week of therapy and at least twice weekly for subsequent weeks.      Rhonda Ordonez        .

## 2017-04-18 NOTE — PROGRESS NOTES
SPIRITUAL HEALTH SERVICES  SPIRITUAL ASSESSMENT Progress Note  [facility]      (Follow up)   first checked pt's room and he was sleeping soundly.     then spoke with pt's daughter, Amaris, in the Family Room.  She said he mother wouldn't be coming in until the pm because she needed the break.  Amaris said besides the care giving her mother had been providing for her Dad as well as having her own health issues, her Mom had overseen their move from their lifelong residence last fall.  Pt's daughter proceeded to his room.    provided supportive listening to pt's daughter.   is available for pt/family needs.    Jackson Wilkinson M.Div., [facility]  Staff   Office tel: [phone]

## 2017-04-18 NOTE — DOWNTIME EVENT NOTE
The EMR was down for 6 hours on 4/18/2017.    ANJUM Arias RN and MILTON Riggs were responsible for completing the paper charting during this time period.     The following information was re-entered into the system by Amelia Callaway: MAR, last vitals, I&O and Patrick LDA    The following information will remain in the paper chart: all other pertinent medical information    Amelia Callaway  4/18/2017

## 2017-04-18 NOTE — PROGRESS NOTES
[facility] Progress Note          Assessment and Plan:   Assessment:   Active Problems:    Sepsis (H)    Assessment: Patient presented with fever, elevated lactic acid, elevated CRP with worsening mentation and chest x-ray indicating a right lower lobe pneumonia. Patient also has a UA which is showing possible urinary tract infection. So far blood cultures and urine culture have shown no growth to date the patient has been placed on broad-spectrum antibiotics including vancomycin, Levaquin, and Zosyn with progressive improvement and resolution of sepsis symptoms.    Plan:  We'll continue with broad-spectrum antibiotic coverage and further investigation at the patient's future aspiration risk.  We'll await blood culture and urine culture results and attempt to narrow antibiotic spectrum as results return and patient's ability to follow if more firmly evaluated.  Did discuss patient's progress up to this point as well as anticipated challenges going forward and need for further evaluation of dysphasia status. All questions the family had have been answered at this time but will have a formal care conference in 2 days as patient continues to improve to discuss further.      Acute on chronic respiratory failure with hypoxia (H)    Assessment:  S/P worsening secondary to right lower lobe pneumonia identified on chest x-ray with patient having progressive improvement of his respiratory symptoms    Plan:  Continue with antibiotic coverage as above and wean O2 supplementation as tolerated.      Pneumonia - concern for healthcare associated pneumonia vs aspiration pneumonia    Assessment:  Present in right lower lobe on chest x-ray and thought to be the underlying source of sepsis with patient having high risk for either healthcare associated or aspiration pneumonia    Plan:  We'll proceed with video swallowing evaluation to assess patient's aspiration risk going forward. Continue treatment with broad-spectrum  antibiotics as above      Acute cystitis without hematuria    Assessment:  UA is suggestive of possible urinary tract infection with patient with chronic indwelling Patrick catheter. Urine culture shows no growth to date    Plan:  Continue with antibiotic treatment as above and monitor closely for culture results.       Dysphagia    Assessment:  Progressively has been worsening over several months, likely secondary to patient's progressive dementia, however does appear acutely worsened during this hospitalization with patient coughing after even nectar thickened liquids    Plan:  Proceed with video swallowing evaluation this afternoon, keep n.p.o. until evaluation has been performed.      Hypernatremia    Assessment:  Present at time of admission and thought secondary to dehydration, however sodium level continues to be significantly elevated at 161 despite aggressive rehydration    Plan:  We'll transition the patient to D5W and recheck sodium tomorrow.      Dementia with behavioral disturbance, unspecified dementia type    Assessment:  Present at baseline and progressively worsening    Plan:  Continue to treat the acute infection and monitor for improvement back to previous baseline      Encephalopathy    Assessment:  Secondary to sepsis as above, starting to improve     Plan:  Continue treatment plan as above and close monitoring as above         VTE:  SCDs  Code Status:  DNR/DNI        Interval History:   About the same today - continues to be oriented only to person, however does appear to recognize most of his family members today which is an improvement from previous.  Vitals signs stable - last fever was last evening and was 100.9 rectally.  Highest temperature today has been 99.7.  Patient is more alert but continues to have apparent hallucinations and picking behavior. Multiple attempts have occurred to evaluate swallowing, however patient is only pocketing food and not even attempting to follow. Tolerating  "medications and treatment plan without significant side effects or problems.  Adequate urinary output via Patrick catheter.         Significant Problems:     Past Medical History:   Diagnosis Date     Caregiver burden 8/31/2016    strongly advised placement into nursing care facility as his dementia worsens.      Hypertension             Physical Exam:   Blood pressure 159/72, pulse 57, temperature 96.6  F (35.9  C), temperature source Oral, resp. rate 20, height 1.626 m (5' 4\"), weight 55.1 kg (121 lb 8 oz), SpO2 98 %.  Constitutional:   awake, alert, cooperative but unable to follow directions and answers given our not appropriate for questions asked, no apparent distress but patient does appear mildly restless and is picking at his blankets throughout the conversation and not following the conversation, and appears stated age     Lungs:   No increased work of breathing, decreased air exchange however patient is not able to follow instructions to take deep breaths, very mild crackles present at bilateral bases, no wheezing on auscultation      Cardiovascular:   Normal apical impulse, regular rate and rhythm, normal S1 and S2, no S3 or S4, and no murmur noted     Abdomen:    bowel sounds are present, abdomen is soft and nontender      Musculoskeletal:   no lower extremity pitting edema present     Neurologic:    alert, awake, oriented only to person but does recognize some family members present today, is focal and talking but conversation is difficult to track given patient's underlying dementia and he is not answering questions appropriately at this time      Skin:   normal skin color, texture, turgor             Data:   All laboratory and imaging data in the past 24 hours reviewed    Attestation:  I have reviewed today's vital signs, notes, medications, labs and imaging.     More than 45 minutes was spent face to face with patient and family and over 50% of that time was spent on counseling and coordination of " care.      Electronically Signed:  Tia Finnegan MD    Note: Chart documentation done in part with Dragon Voice Recognition software. Although reviewed after completion, some word and grammatical errors may remain.

## 2017-04-18 NOTE — PLAN OF CARE
Problem: Goal Outcome Summary  Goal: Goal Outcome Summary  Outcome: No Change  VSS. Afebrile. Pt denies pain/discomfort. Pt repositioned every 2 hours and as daughter requests. Pt becomes agitated at times yelling out, hitting and pinching staff and daughter. Pt pulls at O2 mask and deal at times successfully redirected. LS course throughout. No edema. No complaints.

## 2017-04-19 PROBLEM — E87.6 HYPOKALEMIA: Status: ACTIVE | Noted: 2017-01-01

## 2017-04-19 PROBLEM — N17.9 ACUTE KIDNEY FAILURE (H): Status: ACTIVE | Noted: 2017-01-01

## 2017-04-19 NOTE — PROGRESS NOTES
[facility] Progress Note          Assessment and Plan:   Assessment:   Active Problems:    Sepsis (H)    Assessment: Patient presented with fever, elevated lactic acid, elevated CRP with worsening mentation, acute kidney failure, acute on chronic respiratory failure and right lower lobe pneumonia with possible UTI on UA.  Blood cultures continue to be negative on day 3 of growth.  UC continues to show no growth.  Patient has been having progressive resolution of sepsis symptoms and improvement of acute respiratory failure component with vancomycin, Levaquin and Zosyn and renal failure has resolved as of this morning.      Plan: Continue with broad spectrum antibiotics and monitor for ongoing improvement.  Currently given patient's mental status and underlying dementia and dysphagia is not safe to swallow anything, therefore if ongoing treatment is needed and swallowing continues to be unsafe would need to consider PICC line for ongoing IV antibiotics.  Family conference is planned for tomorrow afternoon to discuss treatment options going forward as patient continues to recover from this acute illness.        Acute on chronic respiratory failure with hypoxia (H)    Assessment: acutely worsened secondary to right lower lobe pneumonia, patient's respiratory needs have decreased in the past 24 hours - now requiring 2-3 L NC oxygen.  Having wet sounding secretions but patient is not capable of following directions to cough - does have some spontaneous coughing and this helps improve secretions.  Very mild crackles diffusely on exam but unchanged from yesterday    Plan: continue with broad spectrum antibiotics as above, continue to wean O2 supplementation as tolerated and monitor for ongoing improvement of respiratory status.       Pneumonia - health care associated vs aspiration    Assessment: right lower lobe infiltrate, likely leading to the sepsis as above.  Patient is at high risk of either aspiration  pneumonia and healthcare associated pneumonia    Plan: continue with broad spectrum antibiotic therapy for now, monitor for ongoing resolution of sepsis symptoms and ongoing respiratory improvement as above.  Would need to reassess swallowing ability before considering transitioning to PO antibiotics.        Acute cystitis without hematuria    Assessment: Urine culture is currently showing no growth but is being re-incubated    Plan:  Continue with protective antibiotics as above      Acute kidney failure    Assessment:  Present at time of admission and thought secondary to sepsis as above. Creatinine was 2.45 on initial evaluation and has finally returned to the normal range of 1.03 today. Patient's baseline is approximately 0.8-1.0    Plan:   Continue with D5W fluids and add potassium chloride secondary to hypocalcemia below. Recheck creatinine tomorrow and continue to monitor urinary output closely      Dementia with behavioral disturbance, unspecified dementia type    Assessment: Present at baseline, with acute encephalopathy secondary to infection as above. Patient's baseline dementia does appear fairly severe and currently patient is unable to follow directions or safely swallow    Plan:  Continue treatment of acute infection as above and monitor for ongoing mentation improvement. Further discussion regarding prognosis and treatment plan going forward will need to occur based on patient's recovery and baseline status going forward. Care conference has been planned for tomorrow at 2:00 to discuss mentation and NPO status as well as desires for future care be on this hospitalization.      Dysphagia    Assessment: Ongoing and severe, with patient unable to follow directions and does not have an adequate swallow reflex    Plan: Continue to keep patient n.p.o. for now and monitor for ongoing hopeful improvement in mentation. We'll have family conference tomorrow at 2:00 as discussed above.      Encephalopathy     "Assessment: worsened mentation from baseline secondary to sepsis as above with patient starting to show signs of improvement and return to previous dementia baseline - no obvious hallucinations today, still unable to follow directions    Plan: continue treatment of acute infection and monitor for ongoing improvement       Hypernatremia    Assessment: present initially and thought secondary to severe dehydration - patient was transitioned to D5W fluids yesterday and sodium is starting to trend downward - decreased from 161 yesterday to 155 today    Plan: Will continue with D5W fluid base at 75 cc/hour and continue to monitor for ongoing improvement.        Hypokalemia    Assessment: Presenting today for unclear etiology, however patient has been receiving IV hydration without supplemental potassium for several days which may be contributing    Plan: Potassium has been added to patient's maintenance fluids and will continue with supplemental potassium per protocol       VTE:  SCDs  Code Status:  DNR/DNI        Interval History:   Continues to improve.  Vital signs generally better with patient afebrile for over 24 hours, requiring less O2 supplementation today.  Continues to be very confused and today is less conversational than yesterday, still unable to answer questions or follow directions.  Voiding well.  Tolerating medications without significant side effects.  No new concerns today.            Significant Problems:     Past Medical History:   Diagnosis Date     Caregiver burden 8/31/2016    strongly advised placement into nursing care facility as his dementia worsens.      Hypertension             Physical Exam:   Blood pressure 154/83, pulse 68, temperature 96.4  F (35.8  C), temperature source Axillary, resp. rate 20, height 1.626 m (5' 4\"), weight 55.5 kg (122 lb 5.7 oz), SpO2 (!) 88 %.  Constitutional:   awake, alert, cooperative but unable to follow directions - does not protest to exam, no apparent " distress, and appears stated age - does appear weaker and more tired today than yesterday     Lungs:   No increased work of breathing, decreased air exchange but patient is unable to follow directions to take deep breaths, ongoing mild crackles at bilateral bases slightly worse on right than left but overall unchanged from previous     Cardiovascular:   Normal apical impulse, regular rate and rhythm, normal S1 and S2, no S3 or S4, and no murmur noted     Abdomen:   Bowel sounds present, abdomen soft and non-tender     Musculoskeletal:   no lower extremity pitting edema present     Neurologic:   Awake, alert, making eye contact for brief periods of time but conversation is more mumbled today and answers still not appropriate for questions asked, has less picking behaviors today.     Skin:   normal skin color, texture, turgor             Data:   All laboratory data reviewed    Attestation:  I have reviewed today's vital signs, notes, medications, labs and imaging.     Electronically Signed:  Tia Finnegan MD    Note: Chart documentation done in part with Dragon Voice Recognition software. Although reviewed after completion, some word and grammatical errors may remain.

## 2017-04-19 NOTE — PROGRESS NOTES
S-(situation): VPM initiated    B-(background): pneumonia    A-(assessment): Video monitoring initiated.Pt.gets restless at times and has a history of dementia.No family at bedside.Monitoring was explained to daughter before she left this morning and she was in agreement with this.    R-(recommendations): Continue the monitoring at this time.

## 2017-04-19 NOTE — PLAN OF CARE
Problem: Goal Outcome Summary  Goal: Goal Outcome Summary  Outcome: No Change  Up in recliner from 1300 to 1900, used ceiling lift to return to bed. Appears more content today since he is not always saying he needs to void , Patrick 275  Output.  LS coarse, sat's low 90's on 2 L O2 n/c attempted to wean down to 1 L , unsuccessful. Alert to self only. IVF @ 75 with scheduled vanco/ zosyn / Levaquin. Coccyx red/ blanchable with a small area staff referring to a shear/ there upon admission.

## 2017-04-19 NOTE — PLAN OF CARE
Problem: Goal Outcome Summary  Goal: Goal Outcome Summary  Outcome: Improving  Plan for family care conference tomorrow at 2 to make decisions on pts.NPO status etc.Pt.has some scattered coarse breath sounds bilaterally.He has lots of upper airway congestion but is unable to expectorate any sputum even with encouragement.Pt.s has redness with a moisture slit in his coccyx area and pt.has some open skin areas to both inner buttocks.Barrier cream is applied and pt.is turned every 2 hours but he lots of time goes to his back which he prefers.Remains NPO and receiving potassium replacement.Pt.is getting rectal tylenol for assumed pain.

## 2017-04-19 NOTE — PHARMACY-VANCOMYCIN DOSING SERVICE
Pharmacy Vancomycin Note  Date of Service 2017  Patient's  1931   86 year old, male    Indication: Healthcare-Associated Pneumonia  Goal Trough Level: 15-20 mg/L  Day of Therapy: 4  Current Vancomycin regimen:  1250 mg IV q48h    Current estimated CrCl = Estimated Creatinine Clearance: 40.4 mL/min (based on Cr of 1.03).    Creatinine for last 3 days  2017: 11:03 PM Creatinine 1.95 mg/dL  2017:  5:40 AM Creatinine 1.67 mg/dL  2017: 12:00 PM Creatinine 1.34 mg/dL  2017:  5:27 AM Creatinine 1.03 mg/dL    Recent Vancomycin Levels (past 3 days)  No results found for requested labs within last 72 hours.    Vancomycin IV Administrations (past 72 hours)                   vancomycin 1250 mg in 0.9% NaCl 250 mL PREMIX (mg) 1,250 mg New Bag 17 2228     1,250 mg New Bag 17 0046                Nephrotoxins and other renal medications (Future)    Start     Dose/Rate Route Frequency Ordered Stop    17 2331  vancomycin 1250 mg in 0.9% NaCl 250 mL PREMIX      1,250 mg Intravenous EVERY 48 HOURS 17 2330      17 2322  piperacillin-tazobactam (ZOSYN) infusion 2.25 g      2.25 g  100 mL/hr over 30 Minutes Intravenous EVERY 6 HOURS 17 2322               Contrast Orders - past 72 hours (72h ago through future)    Start     Dose/Rate Route Frequency Ordered Stop    17 1315  barium sulfate (VARIBAR) 40 % pudding/paste 1 mL      1 mL Oral ONCE 17 1305      17 1315  barium sulfate 40% (VARIBAR HONEY or NECTAR) oral suspension 1 mL      1 mL Oral ONCE 17 1305      17 1300  barium sulfate (E-Z-PAQUE) oral suspension 60%       Oral ONCE 17 1253      17 1300  barium sulfate (VARIBAR) 40 % pudding/paste 230 mL      230 mL Oral ONCE 17 1253            Interpretation of levels and current regimen:    Has serum creatinine changed > 50% in last 72 hours: Yes, improving    Urine output:  good urine output    Renal Function:  Improving    Plan:  1.  Increase Dose to 1250 mg IV Q24H, due to creatinine clearance improving.   2.  Pharmacy will check trough levels as appropriate in 1-3 Days.    3. Serum creatinine levels will be ordered daily for the first week of therapy and at least twice weekly for subsequent weeks.      Rhonda Ordonez        .

## 2017-04-19 NOTE — PLAN OF CARE
Problem: Goal Outcome Summary  Goal: Goal Outcome Summary  Outcome: No Change  VSS.  On o2 at 2L/min/nc.  Lungs coarse, has loose productive cough, swallows sputum.  Patrick had 500cc out since MN.  Had tylenol per daughters request, pt says no to pain but he did rest for 2 hours after tylenol.  IVF continue.  Continues to be oriented to person only.  No stool during the night.  Skin unchanged,see flow sheet.

## 2017-04-20 NOTE — PROGRESS NOTES
[facility] Progress Note          Assessment and Plan:   Assessment:   Active Problems:    Acute on chronic respiratory failure with hypoxia (H)    Assessment: Overnight patient has had progressive worsening. Nursing staff began noticing some increased difficulty in handling secretions as well as very wet sounding upper airway breathing and this morning patient has had sudden respiratory worsening with O2 saturation 70% even on 15 L face mask.  Patient remains afebrile, blood work evaluation unremarkable. Family that was present with the patient overnight is requesting consideration of transitioned to comfort care.  Care conference was performed this morning with many family members in attendance and reviewed patient's hospital course up to this time as well as the acute respiratory worsening pain this morning and possible causes for this as well as treatment options going forward.  All are in agreement at this point to transition to comfort care given his respiratory worsening, ongoing inability to swallow liquids, and poor prognosis and progressive worsening over the past few months with poor quality of life at baseline.    Plan:  Patient will be transitioned to comfort care. Family is aware that if patient does survive the night, he would be discharged back to [facility] and they are requesting [place] hospice services be initiated to help with comfort care going forward.      Sepsis (H)    Assessment: Present initially, thought secondary to pneumonia with signs and symptoms of sepsis resolving, now with acute respiratory failure as above    Plan:  Transition to comfort care, discontinue IV antibiotics and IV fluids      Pneumonia - health care associated vs aspiration    Assessment:  Present initially, thought secondary most likely to aspiration given patient's severe dysphagia, however was also at risk for healthcare associated pneumonia, now with respiratory worsening as above    Plan:  Transition  patient to comfort care as above      Acute kidney failure (H)    Assessment:  Present initially and secondary to sepsis, had fully resolved yesterday as signs and symptoms of sepsis and dehydration were improving    Plan:  No further monitoring or treatment is needed.       Dementia with behavioral disturbance, unspecified dementia type    Assessment:  Severe at baseline and progressively worsening over the past few months with patient's quality of life continuing to decline    Plan:  No further treatment is needed apart from comfort care as above      Dysphagia    Assessment:  Severe, with patient being unable to safely swallow anything at this point    Plan:  Will provide liquid medications as needed for comfort but also attempt alternative routes in the management of patient's symptomatic control.      Encephalopathy    Assessment:  Present initially and secondary to sepsis, patient had had some cognitive clearing with treatment of sepsis, however currently patient is minimally responsive and does appear close to death    Plan:  Focus on comfort care as above      Acute cystitis without hematuria    Assessment:  Suspected initially the urine culture is showing no significant growth    Plan:  No further treatment is needed      Hypernatremia    Assessment:  Present initially and thought secondary to severe dehydration, with potassium still elevated today but trending downward    Plan:  No further monitoring or IV fluids are needed, transitioned to comfort care as above      Hypokalemia    Assessment:  Resolved following supplementation    Plan:  No further monitoring is needed       VTE:  None - transitioned to comfort care  Code Status:  DNR/DNI        Interval History:   Appears worse today - patient has had sudden worsening in the past 8 hours with increased respiratory secretions, poor mentation, increased difficulty with breathing, and drastic increase in O2 needs.  Vitals less stable than yesterday.  Not  "responding well to interventions and the treatment plan and appears to be more actively dying.            Significant Problems:     Past Medical History:   Diagnosis Date     Caregiver burden 8/31/2016    strongly advised placement into nursing care facility as his dementia worsens.      Hypertension             Physical Exam:   Blood pressure 191/84, pulse 96, temperature 99.1  F (37.3  C), temperature source Rectal, resp. rate (!) 34, height 1.626 m (5' 4\"), weight 56.5 kg (124 lb 9 oz), SpO2 (!) 70 %.  Constitutional:   Awake but not focusing well expect for brief glances, appears more restless and short of breath.     Lungs:   Tachypnea present in the 30-40 range, patient using accessory muscles to breath, lungs are diminished but no wheezing present and crackles actually are slightly improved from yesterday - sounds wet but most of this is upper airway     Cardiovascular:   Sinus tachycardia with HR currently in the 110s     Abdomen:   Bowel sounds hypoactive but present, patient using accessory muscles to breath, no apparent tenderness on palpation     Musculoskeletal:   Patient is moving arms spontaneously and appears restless     Neurologic:   Awake but not alert, staring off into space most of the time but will make eye contact briefly, no verbal attempts     Skin:   no redness, warmth, or swelling, no mottling seen at this time.             Data:   All laboratory data reviewed    Attestation:  I have reviewed today's vital signs, notes, medications, labs and imaging.     More than 40 minutes was spent with patient and family and over 50% of that time was spent in counseling and coordination of care.     Electronically Signed:  Tia Finnegan MD    Note: Chart documentation done in part with Dragon Voice Recognition software. Although reviewed after completion, some word and grammatical errors may remain.     "

## 2017-04-20 NOTE — PROGRESS NOTES
"SPIRITUAL HEALTH SERVICES  SPIRITUAL ASSESSMENT Progress Note  [facility]       informed  pt was Comfort Cares.  When  entered pt's room, his wife commented, \"we're keeping alexandre.\"  Several other family were present and supporting pt and each other.  Pt's wife indicated pt would welcome a prayer shawl.  Staff came in to re-position pt, so family and  left the room.  Later, when  returned with prayer shawl, only pt's wife was present.   placed prayer shawl on pt, provided a prayer and sang Swing low, sweet chariot.  Pt's wife joined in singing the spiritual.   will follow up tomorrow.    Jackson Wilkinson M.Div., [facility]  Staff   Office tel: [phone]    "

## 2017-04-21 NOTE — PROGRESS NOTES
SPIRITUAL HEALTH SERVICES  SPIRITUAL ASSESSMENT Progress Note  [facility]      (Follow up)  Several family were continuing to keep alexandre.   provided compassionate presence, prayer, and sang a verse of Amazing Mounika.   will follow up before leaving this afternoon.    Jackson Wilkinson M.Div., [facility]  Staff   Office tel: [phone]

## 2017-04-21 NOTE — PLAN OF CARE
Problem: Goal Outcome Summary  Goal: Goal Outcome Summary  Outcome: Therapy, progress toward functional goals as expected  Pt remains on comfort cares.  Checking on Pt and family hourly.  Pt's spouse and family verbalized not wanting to turn Pt to left side as Pt did not tolerate left side and continued to try to turn back to right side.  Pt appears to be comfortable, resting, with no grimacing. Family at bedside all night.  Staff administered one dose of roxanol and and one dose of ativan during my shift.  Average RR of 22-24.  Family and staff have noted apneic episodes of 5-15 seconds.  Oral cares done, Pt resisted pink mouth swab, continuing to use biotene spray, but family didn't want to have done at 0400, as didn't want to disturb him, as he looked so comfortable.  Pt has not appeared to be in any pain, no dyspnea and no rattling heard with respirations.

## 2017-04-21 NOTE — PROGRESS NOTES
At the direction of Dr Finnegan and after family discussion and approval, nasal cannula was removed from pt at 1500. Family came out of the room at 1506 and told nursing staff that pt had . Nursing staff went to check on pt and then informed Dr Finnegan

## 2017-04-21 NOTE — PROGRESS NOTES
SPIRITUAL HEALTH SERVICES  SPIRITUAL ASSESSMENT Progress Note  [facility]      (Follow up)  Pt was sedated and receiving Comfort Cares.  Many family were keeping alexandre.   provided a prayer and sang Swing Arnulfo Thornton Chariot.   will follow up on Monday if pt is still hospitalized.    Jackson Wilkinson M.Div., [facility]  Staff   Office tel: [phone]

## 2017-04-21 NOTE — PROGRESS NOTES
SPIRITUAL HEALTH SERVICES  SPIRITUAL ASSESSMENT Progress Note  [facility]      Death -  extended condolences to pt's wife, two daughters, and a granddaughter and provides Prayers of Commendation and sang a verse of Amazing Mounika.  In addition,  provided a bereavement folder, hospitality and compassionate presence until the  arrived.  Pt then accompanied pt's wife to her car.   will do a bereavement call in the future.    Jackson Wilkinson M.Div., [facility]  Staff   Office tel: [phone]

## 2017-04-21 NOTE — PLAN OF CARE
has been involved regarding discharge planning.  Patient has a bed hold at [facility].  Physician had discussed yesterday with family, the option of plan for patient to return to [place] Family had mentioned that they would want [facility] involved right away at [place].      had contacted [facility] and they are available to do a hospice start of care at [facility] with patient and family at noon today.  Patient would need to transport via BLS ambulance back to [place].      Writer received message from [employer] this morning that family had called in an appeal yesterday.  Hospital staff were unaware that family had called in an appeal yesterday.  There were no discharge orders placed yesterday or as of this morning.  Writer called [employer] and they stated the appeal of #[id number] would be invalid and faxed a form to  to fax them back.  Family has the option of appealing once d/c order is placed, and this was discussed with them.    Family is considering the option of d/c versus calling in a new appeal once the d/c order has been placed.     Writer has left a message with [facility] to come see family here at the hospital at noon versus at [place].  Awaiting call back.          1150- Writer met with family again this morning.  Discussed that writer has verified from [employer] that a d/c order needs to be placed and then they can call in an appeal if they decide on that option. Discussed that patient had been covered under Medicare previously at [place], but would not meet criteria for Medicare coverage for room and board at the nursing home, as he would now be considered a long term care resident, not in rehab.  Wife, Ximena, stated understanding of this.  Wife stated that she has completed an application for medical assistance with the [facility], but has not heard if patient has been approved for medical assistance yet.    Family has not made a decision regarding  discharging or appealing the d/c as of yet.  They are wanting to see the hospice nurse first.     Family currently meeting jose francisco Anguiano RN from [facility].      1551- patient passed away this afternoon.  Writer contacted [facility] that their services would not be needed.  Also notified Sienna Flood and they are also aware.

## 2017-04-21 NOTE — PROGRESS NOTES
Was notified by nursing staff that patient appeared to have .    Did enter the room and it was noted patient had no spontaneous respiration, no movement.  Patient was evaluated by auscultation for 2 minutes without any heartbeat identified and no respiratory effort present. Pupils are fixed.     Time of death was declared at 1521. Family is requesting that the  come if he is still available and will coordinate this if possible.      Electronically Signed:  Tia Finnegan MD

## 2017-04-21 NOTE — PLAN OF CARE
Problem: Goal Outcome Summary  Goal: Goal Outcome Summary  Outcome: Improving  Patient changed to comfort care this morning after family care conference. On 2 liters of oxygen for comfort. Family reported an period of apnea of 5 seconds around 1600. Family requesting all medication be given when available. Education on comfort care and booklets given. Will continue to use medications as needed to keep patient comfortable.

## 2017-04-21 NOTE — DISCHARGE SUMMARY
[facility] Death Summary    Bobby Hanson MRN# [id number]   Age: 86 year old YOB: 1931     Date of Admission:  4/16/2017  Date of Discharge::  4/21/2017  Admitting Physician:  Myke Pathak MD  Discharge Physician:  Tia Finnegan MD    Home clinic: [facility]          Admission Diagnoses:   Elevated troponin [R79.89]  Sepsis, due to unspecified organism (H) [A41.9]  Pneumonia of right lower lobe due to infectious organism [J18.9]          Discharge Diagnosis:   Primary cause of death    Acute on chronic respiratory failure with hypoxia (H)  Contributing factors    Sepsis (H) - with respiratory failure, initial acute kidney failure, acute encephalopathy    Pneumonia - suspected aspiration but may be healthcare associated    Dementia with behavioral disturbance, unspecified dementia type    Dysphagia    Time of death was declared at 1521.           Procedures:   No procedures performed during this admission           Brief History of Illness:   Patient was an 86-year-old gentleman who presented to the emergency room from [facility] with a history of worsening shortness of breath, poor oral intake, and developed hypoxia. Evaluation at the nursing home facility the day prior showed pneumonia and he was being treated by IM Rocephin but developed worsening confusion, worsening hypoxia and decision was made to transition patient to the emergency room via EMS for evaluation. In the emergency room his lactic acid was elevated at 2.3, he was found to be febrile, and requiring significant O2 supplementation. Sodium was very elevated at 160 and chest x-ray did show right lower lobe infiltrate. Patient was found to have acute renal failure in addition to acute encephalopathy. Urine was also suspicious for possible infection. Decision was made to admit the patient for management of his sepsis of unclear etiology (thought most likely secondary to a  pneumonia either aspiration versus healthcare acquired versus urosepsis).             Hospital Course:   Patient was placed on broad-spectrum antibiotics including Zosyn, Levaquin, and vancomycin for adequate coverage of either aspiration or healthcare associated pneumonia as well as coverage of possible urosepsis. He was continued on O2 supplementation and IV fluids and did initially appear to have some moderate improvement with resolution of his acute kidney failure, decreased O2 supplementation needs, and some mental clearing. It was noted at that time the patient had severe dysphasia and was unsafe to swallow anything, as he secondary to his mentation he was pocketing the fluid with concern for aspiration whenever he attempted to drink anything. Video swallow study could not be performed secondary to patient's inability to follow directions and inability to swallow liquids spontaneously. Patient was kept n.p.o. and he was continued on maintenance IV fluids as well as antibiotics.  Despite these interventions patient progressively worsened with decreasing mentation again and had sudden onset of respiratory worsening to severe acute respiratory failure once more. Previous conversation during the hospitalization had occurred regarding desires of treatment going forward and family was already discussing comfort care going forward, and the decision was made to transition patient comfort care at that time.  Patient's symptoms were well managed with comfort care package options for over 24 hours. There was much social complication as family was very resistant to the idea of patient being physically moved back to the nursing home facility as they were concerned because patient much pain. Multiple conversations occurred and family was planning appealing any discharge that was scheduled in hopes it could be delayed long enough that the patient would die prior to any transfer was needed. Given the patient's level of  comfort as well as the family's desires, it was discussed that the O2 supplementation started the day prior for patient discomfort be discontinued as it may prolong his life and was not felt to be needed to manage his ongoing comfort.  Obviously if the patient appeared more short of breath or in distress it would be reinitiated. Family was in agreement and oxygen was discontinued when family felt ready to do so. Patient  a short while later.          Physical Exam:   Patient was monitored for 5 minutes and had no spontaneous respiration, no spontaneous movement. He was auscultated for 2 minutes without any heartbeat identified and no respiratory effort present. Pupils are fixed and time of death was declared.           Discharge Disposition:   Body will be discharged in care of chosen  home when family is ready      Tia Finnegan MD    Note: Chart documentation done in part with Dragon Voice Recognition software. Although reviewed after completion, some word and grammatical errors may remain.

## 2017-04-21 NOTE — PROGRESS NOTES
Patient was evaluated this morning and was found to be medically stable for discharge. It was then brought to the  and my attention that the family had processed and appeal to his discharge yesterday without any staff members being aware and this appeal had been declined as there is no evidence of discharge note her discharge order and therefore it was felt that the appeal was inappropriate for review at this time. Did talk with the family regarding the reason for the appeal, as well as the fact that the appeal had been declined for evaluation secondary to it being performed to early, and the family is very frustrated by this. Did discuss with them their concerns regarding discharge back to the nursing home and their main concern is the discomfort the patient would feel when being transitioned. Did discuss with family in great detail the fact that his medications have been adequately controlled with the oral agents being offered and therefore medically there is no reason for him to continue to require hospitalization and they are in understanding that discharge has to happen today. They are also aware that they do have the right to appeal this process after the discharge order has been placed in an attempt to delay discharge and hopefully allow the patient to die without needing ambulance transport.      Following this care conference began getting the patient ready for discharge back to his nursing home facility. Asked hospice to visit with the family here, which was done, to expedite his admission process if he were to discharge back to the nursing home facility going forward.  Between the time in which the care conference occurred at approximately 10:00 in the morning and hospice evaluated the patient in early afternoon, family had requested several doses of IV medication be given, and then a discussion with hospice said that unless the nursing home could provide the IV medication, they did not feel  he was symptomatically well enough controlled to be discharged today, as he had clearly needed the IV medication to help with his secretions.    Discussed with hospice at this time the alternative methods in which his secretions could be improved, his oral atropine was not felt to be working well enough. Will try scopolamine patch in addition to a oral formulation of Robinul that the pharmacy is willing to make up. Given the late hour at which these changes are made as well as the desire to want to make sure that these changes are appropriate for the patient, decision was made to delay discharge until tomorrow morning, however did emphasize to the family that if this did control his symptoms well enough, or if medically the patient did not seem to have symptoms which required more adjustments, the patient would need to be discharged tomorrow back to the assisted living facility. The family at that time would have the right to appeal the process which may delay the discharge by a day. Family states at this time their goal is to keep the patient in the hospital until death, and therefore they would plan on appealing the process if discharge were medically felt to be appropriate for tomorrow and a discharge order was placed.     Throughout the day have discussed this situation with the  who has been working closely with both hospice and that nursing home to attempt to coordinate care in case patient does not pass away. We'll continue with medications as previously prescribed other than the changes as above. We'll discontinue all IV medication options to see if the oral regimen and rectal regimen patient currently is taking will work well enough. We'll reevaluate the patient in the morning to see if he is medically stable for discharge.    More than 120 minutes have been spent on management and coordination of care of this patient today - more than half of that time has been spent in conversation with  family and the majority of the remainder has been spent in discussion with hospice, pharmacy, social work, etc in an attempt to come up with an appropriate discharge plan in the setting of a family who is strongly resistant to discharge because of fear it will cause their loved one discomfort.      Electronically Signed:  Tia Finnegan MD

## 2017-04-21 NOTE — PROGRESS NOTES
Bobby Hanson  Gender: male  : 1931  [address]  [facility] [address]  [phone] (home)     Medical Record: [id number]  Pharmacy:    [facility] [place], [place] - [address] [place] [address] PHARMACY  Primary Care Provider: Rabia Puri    Parent's names are: Data Unavailable (mother) and Data Unavailable (father).      [facility]  2017     Pt . Marv Petersen RN

## 2017-04-22 LAB
BACTERIA SPEC CULT: NORMAL
BACTERIA SPEC CULT: NORMAL
Lab: NORMAL
Lab: NORMAL
MICRO REPORT STATUS: NORMAL
MICRO REPORT STATUS: NORMAL
SPECIMEN SOURCE: NORMAL
SPECIMEN SOURCE: NORMAL

## 2021-05-27 NOTE — PROGRESS NOTES
S-(situation): Patient arrives to room 248 via cart from ER    B-(background): Fall/UTI    A-(assessment):Writer assuming cares from oncoming nurse; vitals SS/ patient resting in bed alert to person only, pale afebrile. Fluids IV. Skinintact, left arm small indent where tourniquet was left on patient.        R-(recommendations): Orders reviewed with patient and spouse. Will monitor patient per MD orders.     Inpatient nursing criteria listed below were met:    Health care directives status obtained and documented: Yes  Core Measures assessed (SSI): Yes  SCD's Documented: provider to write orders for  Vaccine assessment done and vaccines ordered if appropriate: Yes  Skin issues/needs documented:Yes  Isolation needs addressed, if appropriate: Yes  Fall Prevention: Care plan updated, Education given and documented Yes  MRSA swab completed for patient 55 years and older (exclude CARLITOS and TKA): Yes  My Chart patient sign up addressed and documented: Yes  Care Plan initiated: Yes  Education Assessment documented:Yes  Education Documented (Pre-existing chronic infection such as, MRSA/VRE need education on admission): Yes  New medication patient education completed and documented (Possible Side Effects of Common Medications handout): Yes  Home medications if not able to send immediately home with family stored here: Yes   Reminder note placed in discharge instructions: NA  Discharge planning review completed (admission navigator) Yes         Statement Selected

## 2022-01-03 NOTE — TELEPHONE ENCOUNTER
Type of Visit  INPATIENT  Diagnosis/Reason for Visit  Transient Alteration Of Awareness  Date of Visit  02-  # of ED Visits in past year  1      Please call patient for follow up.     No
